# Patient Record
Sex: FEMALE | Race: BLACK OR AFRICAN AMERICAN | Employment: FULL TIME | URBAN - METROPOLITAN AREA
[De-identification: names, ages, dates, MRNs, and addresses within clinical notes are randomized per-mention and may not be internally consistent; named-entity substitution may affect disease eponyms.]

---

## 2022-08-01 ENCOUNTER — OFFICE VISIT (OUTPATIENT)
Dept: FAMILY MEDICINE CLINIC | Age: 42
End: 2022-08-01
Payer: COMMERCIAL

## 2022-08-01 VITALS
DIASTOLIC BLOOD PRESSURE: 80 MMHG | WEIGHT: 180.6 LBS | SYSTOLIC BLOOD PRESSURE: 113 MMHG | OXYGEN SATURATION: 99 % | BODY MASS INDEX: 33.23 KG/M2 | HEART RATE: 72 BPM | HEIGHT: 62 IN

## 2022-08-01 DIAGNOSIS — M25.562 CHRONIC PAIN OF BOTH KNEES: ICD-10-CM

## 2022-08-01 DIAGNOSIS — Z76.89 ESTABLISHING CARE WITH NEW DOCTOR, ENCOUNTER FOR: Primary | ICD-10-CM

## 2022-08-01 DIAGNOSIS — G89.29 CHRONIC PAIN OF BOTH KNEES: ICD-10-CM

## 2022-08-01 DIAGNOSIS — M25.561 CHRONIC PAIN OF BOTH KNEES: ICD-10-CM

## 2022-08-01 DIAGNOSIS — M25.522 CHRONIC ELBOW PAIN, LEFT: ICD-10-CM

## 2022-08-01 DIAGNOSIS — F41.9 ANXIETY: ICD-10-CM

## 2022-08-01 DIAGNOSIS — G89.29 CHRONIC ELBOW PAIN, LEFT: ICD-10-CM

## 2022-08-01 PROCEDURE — 99204 OFFICE O/P NEW MOD 45 MIN: CPT | Performed by: FAMILY MEDICINE

## 2022-08-01 RX ORDER — IBUPROFEN 800 MG/1
800 TABLET ORAL EVERY 6 HOURS PRN
COMMUNITY

## 2022-08-01 RX ORDER — CYCLOBENZAPRINE HCL 10 MG
10 TABLET ORAL PRN
COMMUNITY
End: 2022-08-21 | Stop reason: ALTCHOICE

## 2022-08-01 RX ORDER — FLUOXETINE HYDROCHLORIDE 20 MG/1
20 CAPSULE ORAL DAILY
Qty: 90 CAPSULE | Refills: 3 | Status: SHIPPED | OUTPATIENT
Start: 2022-08-01

## 2022-08-01 SDOH — ECONOMIC STABILITY: FOOD INSECURITY: WITHIN THE PAST 12 MONTHS, YOU WORRIED THAT YOUR FOOD WOULD RUN OUT BEFORE YOU GOT MONEY TO BUY MORE.: SOMETIMES TRUE

## 2022-08-01 SDOH — ECONOMIC STABILITY: FOOD INSECURITY: WITHIN THE PAST 12 MONTHS, THE FOOD YOU BOUGHT JUST DIDN'T LAST AND YOU DIDN'T HAVE MONEY TO GET MORE.: SOMETIMES TRUE

## 2022-08-01 ASSESSMENT — PATIENT HEALTH QUESTIONNAIRE - PHQ9
SUM OF ALL RESPONSES TO PHQ QUESTIONS 1-9: 12
SUM OF ALL RESPONSES TO PHQ9 QUESTIONS 1 & 2: 3
9. THOUGHTS THAT YOU WOULD BE BETTER OFF DEAD, OR OF HURTING YOURSELF: 0
8. MOVING OR SPEAKING SO SLOWLY THAT OTHER PEOPLE COULD HAVE NOTICED. OR THE OPPOSITE, BEING SO FIGETY OR RESTLESS THAT YOU HAVE BEEN MOVING AROUND A LOT MORE THAN USUAL: 0
SUM OF ALL RESPONSES TO PHQ QUESTIONS 1-9: 12
2. FEELING DOWN, DEPRESSED OR HOPELESS: 1
SUM OF ALL RESPONSES TO PHQ QUESTIONS 1-9: 12
SUM OF ALL RESPONSES TO PHQ QUESTIONS 1-9: 12
1. LITTLE INTEREST OR PLEASURE IN DOING THINGS: 2
10. IF YOU CHECKED OFF ANY PROBLEMS, HOW DIFFICULT HAVE THESE PROBLEMS MADE IT FOR YOU TO DO YOUR WORK, TAKE CARE OF THINGS AT HOME, OR GET ALONG WITH OTHER PEOPLE: 0
7. TROUBLE CONCENTRATING ON THINGS, SUCH AS READING THE NEWSPAPER OR WATCHING TELEVISION: 3
5. POOR APPETITE OR OVEREATING: 2
6. FEELING BAD ABOUT YOURSELF - OR THAT YOU ARE A FAILURE OR HAVE LET YOURSELF OR YOUR FAMILY DOWN: 0
4. FEELING TIRED OR HAVING LITTLE ENERGY: 2
3. TROUBLE FALLING OR STAYING ASLEEP: 2

## 2022-08-01 ASSESSMENT — SOCIAL DETERMINANTS OF HEALTH (SDOH): HOW HARD IS IT FOR YOU TO PAY FOR THE VERY BASICS LIKE FOOD, HOUSING, MEDICAL CARE, AND HEATING?: NOT HARD AT ALL

## 2022-08-01 NOTE — PROGRESS NOTES
[facility]  [address] [place] [address] Dr MU[address]OZ [address] [place] [address]  Dept: [phone]      Jagdish Rubalcava is a 42 y.o. female who presents today for follow up on her  medical conditions as noted below. Chief Complaint   Patient presents with    New Patient    Elbow Pain     left    Knee Pain     bilateral    ADHD    Anxiety       There is no problem list on file for this patient. No past medical history on file. No past surgical history on file. No family history on file. Current Outpatient Medications   Medication Sig Dispense Refill    ibuprofen (ADVIL;MOTRIN) 800 MG tablet Take 800 mg by mouth every 6 hours as needed for Pain      cyclobenzaprine (FLEXERIL) 10 MG tablet Take 10 mg by mouth as needed for Muscle spasms      FLUoxetine (PROZAC) 20 MG capsule Take 1 capsule by mouth in the morning. 90 capsule 3    diclofenac sodium (VOLTAREN) 1 % GEL Apply 4 g topically in the morning and 4 g at noon and 4 g in the evening and 4 g before bedtime. 150 g 3     No current facility-administered medications for this visit.      ALLERGIES:  No Known Allergies    Social History     Tobacco Use    Smoking status: Every Day     Packs/day: 0.50     Types: Cigarettes     Passive exposure: Current    Smokeless tobacco: Never   Substance Use Topics    Alcohol use: Yes     Comment: occasioanlly        No results found for: LDLCALC, LDLCHOLESTEROL, HDL, BUN, CREATININE, GLUCOSE, LABA1C, LABMICR           Subjective:      HPI  Is seen today as a new patient to establish care she is relocated from [facility] she works for [employer]  She states she has been having ongoing elbow pain for years she is seeing orthopedics in [facility] has had injections continues to have ongoing pain  She also has bilateral knee pain she has never had any surgeries no injuries but does do work  She also thinks she has ADD has never been diagnosed with definitely has concentration deficit and would like to get on medications  And she has ongoing issues with anxiety has been placed on Prozac and it seems to work but she needs refill she states she is up-to-date on labs and mammogram    Review of Systems:     Constitutional: Negative for fever, appetite change and fatigue. Family social and medical history reviewed and unchanged     HENT: Negative. Negative for nosebleeds, trouble swallowing and neck pain. Eyes: Negative for photophobia and visual disturbance. Respiratory: Negative. Negative for chest tightness and shortness of breath. Cardiovascular: Negative. Negative for chest pain and leg swelling. Gastrointestinal: Negative. Negative for abdominal pain and blood in stool. Endocrine: Negative for cold intolerance and polyuria. Genitourinary: Negative for dysuria and hematuria. Musculoskeletal: Negative. Skin: Negative for rash. Allergic/Immunologic: Negative. Neurological: Negative. Negative for dizziness, weakness and numbness. Hematological: Negative. Negative for adenopathy. Does not bruise/bleed easily. Psychiatric/Behavioral: Negative for sleep disturbance, dysphoric mood and  decreased concentration. The patient is not nervous/anxious. Objective:     Physical Exam:     Nursing note and vitals reviewed. /80 (Site: Left Upper Arm, Position: Sitting, Cuff Size: Medium Adult)   Pulse 72   Ht 5' 2\" (1.575 m)   Wt 180 lb 9.6 oz (81.9 kg)   LMP 07/30/2022   SpO2 99%   BMI 33.03 kg/m²   Constitutional: She is oriented to person, place, and time. She   appears well-developed and well-nourished. HENT:   Head: Normocephalic and atraumatic. Right Ear: External ear normal. Tympanic membrane is not erythematous. No middle ear effusion. Left Ear: External ear normal. Tympanic membrane is not erythematous. No middle ear effusion. Nose: No mucosal edema. Mouth/Throat: Oropharynx is clear and moist. No posterior oropharyngeal erythema.     Eyes: Conjunctivae and EOM are normal. Pupils are equal, round, and reactive to light. Neck: Normal range of motion. Neck supple. No thyromegaly present. Cardiovascular: Normal rate, regular rhythm and normal heart sounds. No murmur heard. Pulmonary/Chest: Effort normal and breath sounds normal. She has no wheezes. Shehas no rales. Abdominal: Soft. Bowel sounds are normal. She exhibits no distension and no mass. There is no tenderness. There is no rebound and no guarding. Genitourinary/Anorectal:deferred  Musculoskeletal: Normal range of motion. She exhibits no edema or tenderness. Lymphadenopathy: She has no cervical adenopathy. Neurological: She is alert and oriented to person, place, and time. She has normal reflexes. Skin: Skin is warm and dry. No rash noted. Psychiatric: She has a normal mood and affect. Her   behavior is normal.       Assessment:      1. Establishing care with new doctor, encounter for    2. Chronic elbow pain, left    3. Chronic pain of both knees    4. Anxiety          Plan:      Call or return to clinic prn if these symptoms worsen or fail to improve as anticipated. I have reviewed the instructions with the patient, answering all questions to her satisfaction. No follow-ups on file. Orders Placed This Encounter   Procedures    Sia Tavera DO, Orthopedic Surgery, [employer]     Referral Priority:   Routine     Referral Type:   Eval and Treat     Referral Reason:   Specialty Services Required     Referred to Provider:   Enma Siddiqi DO     Requested Specialty:   Orthopedic Surgery     Number of Visits Requested:   1     Orders Placed This Encounter   Medications    FLUoxetine (PROZAC) 20 MG capsule     Sig: Take 1 capsule by mouth in the morning. Dispense:  90 capsule     Refill:  3    diclofenac sodium (VOLTAREN) 1 % GEL     Sig: Apply 4 g topically in the morning and 4 g at noon and 4 g in the evening and 4 g before bedtime.      Dispense:  150 g     Refill:  3       Electronically signed by Brittni Felix DO on 8/1/2022 at 7:55 PM

## 2022-08-20 ENCOUNTER — HOSPITAL ENCOUNTER (EMERGENCY)
Age: 42
Discharge: HOME | End: 2022-08-21
Attending: EMERGENCY MEDICINE
Payer: COMMERCIAL

## 2022-08-20 ENCOUNTER — APPOINTMENT (OUTPATIENT)
Dept: CT IMAGING | Age: 42
End: 2022-08-20
Payer: COMMERCIAL

## 2022-08-20 ENCOUNTER — APPOINTMENT (OUTPATIENT)
Dept: GENERAL RADIOLOGY | Age: 42
End: 2022-08-20
Payer: COMMERCIAL

## 2022-08-20 VITALS
HEART RATE: 89 BPM | RESPIRATION RATE: 11 BRPM | TEMPERATURE: 98.4 F | OXYGEN SATURATION: 95 % | DIASTOLIC BLOOD PRESSURE: 92 MMHG | SYSTOLIC BLOOD PRESSURE: 127 MMHG
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
## 2022-08-20 PROCEDURE — 82565 ASSAY OF CREATININE: CPT

## 2022-08-20 PROCEDURE — 85730 THROMBOPLASTIN TIME PARTIAL: CPT

## 2022-08-20 PROCEDURE — 71046 X-RAY EXAM CHEST 2 VIEWS: CPT

## 2022-08-20 PROCEDURE — 84703 CHORIONIC GONADOTROPIN ASSAY: CPT

## 2022-08-20 PROCEDURE — 80051 ELECTROLYTE PANEL: CPT

## 2022-08-20 PROCEDURE — 6360000002 HC RX W HCPCS: Performed by: STUDENT IN AN ORGANIZED HEALTH CARE EDUCATION/TRAINING PROGRAM

## 2022-08-20 PROCEDURE — 72125 CT NECK SPINE W/O DYE: CPT

## 2022-08-20 PROCEDURE — 99284 EMERGENCY DEPT VISIT MOD MDM: CPT

## 2022-08-20 PROCEDURE — 85610 PROTHROMBIN TIME: CPT

## 2022-08-20 PROCEDURE — 70450 CT HEAD/BRAIN W/O DYE: CPT

## 2022-08-20 PROCEDURE — 6370000000 HC RX 637 (ALT 250 FOR IP): Performed by: STUDENT IN AN ORGANIZED HEALTH CARE EDUCATION/TRAINING PROGRAM

## 2022-08-20 PROCEDURE — 73030 X-RAY EXAM OF SHOULDER: CPT

## 2022-08-20 PROCEDURE — 82947 ASSAY GLUCOSE BLOOD QUANT: CPT

## 2022-08-20 PROCEDURE — 84520 ASSAY OF UREA NITROGEN: CPT

## 2022-08-20 PROCEDURE — 96374 THER/PROPH/DIAG INJ IV PUSH: CPT

## 2022-08-20 PROCEDURE — 85027 COMPLETE CBC AUTOMATED: CPT

## 2022-08-20 PROCEDURE — 73590 X-RAY EXAM OF LOWER LEG: CPT

## 2022-08-20 PROCEDURE — 82805 BLOOD GASES W/O2 SATURATION: CPT

## 2022-08-20 PROCEDURE — G0480 DRUG TEST DEF 1-7 CLASSES: HCPCS

## 2022-08-20 PROCEDURE — 73610 X-RAY EXAM OF ANKLE: CPT

## 2022-08-20 PROCEDURE — 73562 X-RAY EXAM OF KNEE 3: CPT

## 2022-08-20 RX ORDER — ORPHENADRINE CITRATE 30 MG/ML
60 INJECTION INTRAMUSCULAR; INTRAVENOUS ONCE
Status: DISCONTINUED | OUTPATIENT
Start: 2022-08-20 | End: 2022-08-21 | Stop reason: HOSPADM

## 2022-08-20 RX ORDER — HYDROCODONE BITARTRATE AND ACETAMINOPHEN 5; 325 MG/1; MG/1
1 TABLET ORAL EVERY 6 HOURS PRN
Status: DISCONTINUED | OUTPATIENT
Start: 2022-08-20 | End: 2022-08-21 | Stop reason: HOSPADM

## 2022-08-20 RX ORDER — FENTANYL CITRATE 50 UG/ML
50 INJECTION, SOLUTION INTRAMUSCULAR; INTRAVENOUS ONCE
Status: COMPLETED | OUTPATIENT
Start: 2022-08-20 | End: 2022-08-20

## 2022-08-20 RX ADMIN — HYDROCODONE BITARTRATE AND ACETAMINOPHEN 1 TABLET: 5; 325 TABLET ORAL at 23:53

## 2022-08-20 RX ADMIN — FENTANYL CITRATE 50 MCG: 50 INJECTION, SOLUTION INTRAMUSCULAR; INTRAVENOUS at 20:54

## 2022-08-20 ASSESSMENT — PAIN SCALES - GENERAL
PAINLEVEL_OUTOF10: 9
PAINLEVEL_OUTOF10: 9

## 2022-08-20 ASSESSMENT — PAIN - FUNCTIONAL ASSESSMENT: PAIN_FUNCTIONAL_ASSESSMENT: 0-10

## 2022-08-20 ASSESSMENT — PAIN DESCRIPTION - LOCATION: LOCATION: ANKLE;SHOULDER;KNEE

## 2022-08-21 LAB
ANION GAP SERPL CALCULATED.3IONS-SCNC: 14 MMOL/L (ref 9–17)
BLOOD BANK SPECIMEN: ABNORMAL
BUN BLDV-MCNC: 9 MG/DL (ref 6–20)
CARBOXYHEMOGLOBIN: 5.1 % (ref 0–5)
CHLORIDE BLD-SCNC: 104 MMOL/L (ref 98–107)
CO2: 21 MMOL/L (ref 20–31)
CREAT SERPL-MCNC: 0.76 MG/DL (ref 0.5–0.9)
ETHANOL PERCENT: 0.17 %
ETHANOL: 165 MG/DL
FIO2: ABNORMAL
GFR AFRICAN AMERICAN: >60 ML/MIN
GFR NON-AFRICAN AMERICAN: >60 ML/MIN
GFR SERPL CREATININE-BSD FRML MDRD: ABNORMAL ML/MIN/{1.73_M2}
GLUCOSE BLD-MCNC: 95 MG/DL (ref 70–99)
HCG QUALITATIVE: NEGATIVE
HCO3 VENOUS: 21.3 MMOL/L (ref 24–30)
HCT VFR BLD CALC: 35.3 % (ref 36.3–47.1)
HEMOGLOBIN: 12.1 G/DL (ref 11.9–15.1)
INR BLD: 1
MCH RBC QN AUTO: 30.3 PG (ref 25.2–33.5)
MCHC RBC AUTO-ENTMCNC: 34.3 G/DL (ref 28.4–34.8)
MCV RBC AUTO: 88.3 FL (ref 82.6–102.9)
NEGATIVE BASE EXCESS, VEN: 3.7 MMOL/L (ref 0–2)
NRBC AUTOMATED: 0 PER 100 WBC
O2 SAT, VEN: 89 % (ref 60–85)
PARTIAL THROMBOPLASTIN TIME: 22.9 SEC (ref 20.5–30.5)
PATIENT TEMP: 37
PCO2, VEN: 41.2 (ref 39–55)
PDW BLD-RTO: 14.7 % (ref 11.8–14.4)
PH VENOUS: 7.33 (ref 7.32–7.42)
PLATELET # BLD: 368 K/UL (ref 138–453)
PMV BLD AUTO: 11.5 FL (ref 8.1–13.5)
PO2, VEN: 61.9 (ref 30–50)
POTASSIUM SERPL-SCNC: 3.1 MMOL/L (ref 3.7–5.3)
PROTHROMBIN TIME: 10.4 SEC (ref 9.1–12.3)
RBC # BLD: 4 M/UL (ref 3.95–5.11)
SARS-COV-2, RAPID: NOT DETECTED
SODIUM BLD-SCNC: 139 MMOL/L (ref 135–144)
SPECIMEN DESCRIPTION: NORMAL
WBC # BLD: 9.3 K/UL (ref 3.5–11.3)

## 2022-08-21 PROCEDURE — 87635 SARS-COV-2 COVID-19 AMP PRB: CPT

## 2022-08-21 RX ORDER — CYCLOBENZAPRINE HCL 5 MG
5 TABLET ORAL 3 TIMES DAILY PRN
Qty: 9 TABLET | Refills: 0 | Status: SHIPPED | OUTPATIENT
Start: 2022-08-21 | End: 2022-08-24
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
## 2022-08-21 NOTE — ED NOTES
The following labs labeled with pt sticker and tubed to lab:     [] Blue     [] Lavender   [] on ice  [] Green/yellow  [] Green/black [] on ice  [] Cathye Cruz  [] on ice  [] Yellow  [] Red  [] Pink  [] VBG  [] VBG    [x] COVID-19 swab    [] Rapid  [] PCR  [] Flu swab  [] Peds Viral Panel     [] Urine Sample  [] Pelvic Cultures  [] Blood Cultures   [] STREP Cultures         Jazlyn Kern RN  08/21/22 0022

## 2022-08-21 NOTE — ED NOTES
Dr. Josef Khalil at bedside with ultrasound. FAST exam negative.       Marly Schaefer RN  08/20/22 4859
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
## 2022-08-23 ASSESSMENT — ENCOUNTER SYMPTOMS
CONSTIPATION: 0
VOMITING: 0
SHORTNESS OF BREATH: 0
ABDOMINAL DISTENTION: 0
ABDOMINAL PAIN: 0
NAUSEA: 0
DIARRHEA: 0
COUGH: 0
FACIAL SWELLING: 0
BACK PAIN: 0

## 2022-11-02 ENCOUNTER — OFFICE VISIT (OUTPATIENT)
Dept: OBGYN CLINIC | Age: 42
End: 2022-11-02
Payer: COMMERCIAL

## 2022-11-02 ENCOUNTER — HOSPITAL ENCOUNTER (OUTPATIENT)
Age: 42
Setting detail: SPECIMEN
Discharge: HOME | End: 2022-11-02

## 2022-11-02 VITALS
HEART RATE: 78 BPM | DIASTOLIC BLOOD PRESSURE: 76 MMHG | SYSTOLIC BLOOD PRESSURE: 119 MMHG | WEIGHT: 186.6 LBS | BODY MASS INDEX: 34.13 KG/M2

## 2022-11-02 DIAGNOSIS — Z12.31 SCREENING MAMMOGRAM FOR BREAST CANCER: ICD-10-CM

## 2022-11-02 DIAGNOSIS — Z01.419 WELL WOMAN EXAM WITH ROUTINE GYNECOLOGICAL EXAM: Primary | ICD-10-CM

## 2022-11-02 DIAGNOSIS — Z01.419 WELL WOMAN EXAM WITH ROUTINE GYNECOLOGICAL EXAM: ICD-10-CM

## 2022-11-02 DIAGNOSIS — N94.6 DYSMENORRHEA: ICD-10-CM

## 2022-11-02 DIAGNOSIS — N92.0 MENORRHAGIA WITH REGULAR CYCLE: ICD-10-CM

## 2022-11-02 DIAGNOSIS — Z72.0 TOBACCO USE: ICD-10-CM

## 2022-11-02 PROCEDURE — 99396 PREV VISIT EST AGE 40-64: CPT | Performed by: STUDENT IN AN ORGANIZED HEALTH CARE EDUCATION/TRAINING PROGRAM

## 2022-11-02 RX ORDER — ACETAMINOPHEN AND CODEINE PHOSPHATE 120; 12 MG/5ML; MG/5ML
1 SOLUTION ORAL DAILY
Qty: 90 TABLET | Refills: 3 | Status: SHIPPED | OUTPATIENT
Start: 2022-11-02

## 2022-11-02 NOTE — PROGRESS NOTES
[address] [facility] Gyne[facility]  [address] [facility], [address] [place] [address]      Beverely Going  11/2/2022                       Primary Care Physician: Jannie Davenport DO    CC: No chief complaint on file. HPI: Christy Devine is a 42 y.o. female [id number] Patient's last menstrual period was 10/01/2022. The patient was seen and examined. She is here for an annual visit. She is complaining of recurrent BV. Discussed using Dove unscented body washes, laundry detergents, sleeping with no undergarments, cotton undergarments and use of condoms. Patient also is a smoker and would like to quit. Recommend patient use a nicotine patch and patient is amenable. She complains of heavy bleeding and dysmenorrhea. Her periods are regular and last 5 days. She describes them as heavy. Her bowel habits are regular. She denies any bloating. She denies dysuria. She denies urinary leaking. She denies vaginal discharge. She is sexually active with single partner, contraception - none. She uses no method for contraception and is not desiring pregnancy. Due to patient being a current every day tobacco user, recommend progesterone only methods to help control bleeding. Patient is interested in the progesterone only pill at this time.     Depression Screen: Symptoms of decreased mood absent  Symptoms of anhedonia absent  **If either question is answered in a  positive fashion then complete the PHQ9 Scoring Evaluation and make the appropriate referral**    REVIEW OF SYSTEMS:   Constitutional: negative fever, negative chills  HEENT: negative visual disturbances, negative headaches  Respiratory: negative dyspnea, negative cough  Cardiovascular: negative chest pain,  negative palpitations  Gastrointestinal: negative abdominal pain, negative RUQ pain, negative N/V, negative diarrhea, negative constipation  Genitourinary: negative dysuria, positive vaginal discharge, positive dysmenorrhea and menorrhagia  Dermatological: negative rash  Hematologic: negative bruising  Immunologic/Lymphatic: negative recent illness, negative recent sick contact  Musculoskeletal: negative back pain, negative myalgias, negative arthralgias  Neurological:  negative dizziness, negative weakness  Behavior/Psych: negative depression, negative anxiety      GYNECOLOGICAL HISTORY:  Age of Menarche: 14  Age of Menopause: N/A     STD History: no past history    Permanent Sterilization: no  Reversible Birth Control: no  Hormone Replacement Exposure: no    OBSTETRICAL HISTORY:  OB History    Para Term  AB Living   4 2 2 0 2 2   SAB IAB Ectopic Molar Multiple Live Births   1 0 0 0 0 0      # Outcome Date GA Lbr Enrique/2nd Weight Sex Delivery Anes PTL Lv   4 AB  6w0d          3 SAB            2 Term      Vag-Spont      1 Term      Vag-Spont          PAST MEDICAL HISTORY:   has no past medical history on file. PAST SURGICAL HISTORY:   has no past surgical history on file. ALLERGIES:  has No Known Allergies. MEDICATIONS:  Prior to Admission medications    Medication Sig Start Date End Date Taking? Authorizing Provider   nicotine (NICODERM CQ) 7 MG/24HR Place 1 patch onto the skin daily for 14 days 22 Yes Allyn Hanson DO   norethindrone (ORTHO MICRONOR) 0.35 MG tablet Take 1 tablet by mouth daily 22  Yes Allyn Hanson DO   ibuprofen (ADVIL;MOTRIN) 800 MG tablet Take 800 mg by mouth every 6 hours as needed for Pain   Yes Historical Provider, MD   FLUoxetine (PROZAC) 20 MG capsule Take 1 capsule by mouth in the morning. 22  Yes Caitlin Goddard DO   diclofenac sodium (VOLTAREN) 1 % GEL Apply 4 g topically in the morning and 4 g at noon and 4 g in the evening and 4 g before bedtime. 22  Yes Caitlin Goddard DO       FAMILY HISTORY:  Family History of Breast, Ovarian, Colon or Uterine Cancer: No   family history is not on file.     SOCIAL HISTORY:   reports that she has been smoking cigarettes. She has been exposed to tobacco smoke. She has never used smokeless tobacco. She reports current alcohol use. She reports current drug use. Drug: Marijuana Deadra Chinedu). HEALTH MAINTENANCE:  Immunization status: stated as up to date, no records available    [address] [place] [address]: Ordered today  Colonoscopy: N/A  Pap Smears: None on file. Ordered today. Family Planning: None. Desires POPs. DEXA: N/A    VITALS:  Vitals:    11/02/22 1547   BP: 119/76   Site: Right Upper Arm   Position: Sitting   Cuff Size: Medium Adult   Pulse: 78   Weight: 186 lb 9.6 oz (84.6 kg)                                                                                                                                                                                                   PHYSICAL EXAM:   General Appearance: Appears healthy. Alert; in no acute distress. Pleasant. Skin: Skin color, texture, turgor normal. No rashes or lesions. HEENT: normocephalic and atraumatic, Thyroid normal to inspection and palpation  Respiratory: Normal expansion. Clear to auscultation. No rales, rhonchi, or wheezing. Cardiovascular: normal rate, normal S1 and S2, no gallops, intact distal pulses and no carotid bruits  Breast:  (Chest): normal appearance, no masses or tenderness  Abdomen: soft, non-tender, non-distended, no right upper quadrant tenderness and no CVA tenderness  Pelvic Exam:   External genitalia: General appearance; normal, Hair distribution; normal, Lesions absent  Urinary system: urethral meatus normal  Vaginal: normal mucosa, no discharge  Cervix: normal appearing cervix without discharge or lesions  Adnexa: non-palpable  Uterus: normal single, nontender  Rectal Exam: exam declined by patient  Musculoskeletal: no gross abnormalities  Extremities: non-tender BLE and non-edematous  Psych:  oriented to time, place and person     DATA:  No results found for this visit on 11/02/22.     ASSESSMENT & PLAN:    Christian Cedillo Trista Bianchi is a 42 y.o. female [id number] Patient's last menstrual period was 10/01/2022. Annual:  Mammogram: Ordered today  Colonoscopy: N/A  Pap Smears: None on file. Ordered today. Family Planning: None. Desires POPs. DEXA: N/A    Recurrent BV   - Vag probe and GC pending. Will treat based on results. Dysmenorrhea and Menorrhagia with Regular Cycle   - POP Rx sent   - GYN US ordered    Tobacco use   - Desires Nicotine patch    Patient Active Problem List    Diagnosis Date Noted    Tobacco use 11/02/2022     Priority: Medium     Desires Nicotine Patch      Dysmenorrhea 11/02/2022     Priority: Medium    Menorrhagia with regular cycle 11/02/2022     Priority: Medium         Return for Needs GYN US with Miriam Jeronimo No Patient Care Coordination Note on file. Counseling Completed:    Discussed need for repeat pap as per American Society for Colposcopy and Cervical Pathology guidelines. Discussed need for mammograms every 1 year, If >42 yo and last mammogram was negative. Discussed Calcium and Vitamin D dosing. Discussed need for colonoscopy screening as well as onset for bone density testing. Discussed birth control and barrier recommendations. Discussed STD counseling and prevention. Discussed Gardisil counseling for all patients 10-37 yo. Hereditary Breast, Ovarian, Colon and Uterine Cancer screening discussed. Tobacco & Secondary smoke risks discussed; with recommendation for cessation and avoidance. Routine health maintenance per patients PCP discussed. Diagnosis Orders   1. Well woman exam with routine gynecological exam  PAP SMEAR    Vaginitis DNA Probe    C.trachomatis N.gonorrhoeae DNA      2. Screening mammogram for breast cancer  JOSEPHINE MINESH DIGITAL SCREEN BILATERAL      3. Tobacco use  nicotine (NICODERM CQ) 7 MG/24HR      4. Dysmenorrhea  US PELVIS COMPLETE NON-OB TRANSABDOMINAL AND TRANSVAGINAL    norethindrone (ORTHO MICRONOR) 0.35 MG tablet      5.  Menorrhagia with regular cycle

## 2022-11-03 DIAGNOSIS — B96.89 BACTERIAL VAGINITIS: Primary | ICD-10-CM

## 2022-11-03 DIAGNOSIS — N76.0 BACTERIAL VAGINITIS: Primary | ICD-10-CM

## 2022-11-03 LAB
C TRACH DNA GENITAL QL NAA+PROBE: NEGATIVE
CANDIDA SPECIES, DNA PROBE: NEGATIVE
GARDNERELLA VAGINALIS, DNA PROBE: POSITIVE
HPV SAMPLE: NORMAL
HPV, GENOTYPE 16: NOT DETECTED
HPV, GENOTYPE 18: NOT DETECTED
HPV, HIGH RISK OTHER: NOT DETECTED
HPV, INTERPRETATION: NORMAL
N. GONORRHOEAE DNA: NEGATIVE
SOURCE: ABNORMAL
SPECIMEN DESCRIPTION: NORMAL
SPECIMEN DESCRIPTION: NORMAL
TRICHOMONAS VAGINALIS DNA: NEGATIVE

## 2022-11-03 RX ORDER — METRONIDAZOLE 7.5 MG/G
GEL VAGINAL
Qty: 70 G | Refills: 2 | Status: SHIPPED | OUTPATIENT
Start: 2022-11-03

## 2022-11-03 RX ORDER — METRONIDAZOLE 500 MG/1
500 TABLET ORAL 2 TIMES DAILY
Qty: 14 TABLET | Refills: 0 | Status: SHIPPED | OUTPATIENT
Start: 2022-11-03 | End: 2022-11-03

## 2022-11-03 RX ORDER — FLUCONAZOLE 150 MG/1
150 TABLET ORAL ONCE
Qty: 2 TABLET | Refills: 0 | Status: SHIPPED | OUTPATIENT
Start: 2022-11-03 | End: 2022-11-03

## 2022-11-03 RX ORDER — METRONIDAZOLE 7.5 MG/G
GEL VAGINAL
Qty: 70 G | Refills: 2 | Status: SHIPPED | OUTPATIENT
Start: 2022-11-03 | End: 2022-11-03

## 2022-11-03 RX ORDER — METRONIDAZOLE 500 MG/1
500 TABLET ORAL 2 TIMES DAILY
Qty: 14 TABLET | Refills: 0 | Status: SHIPPED | OUTPATIENT
Start: 2022-11-03 | End: 2022-11-10

## 2022-11-04 DIAGNOSIS — M25.522 LEFT ELBOW PAIN: Primary | ICD-10-CM

## 2022-11-09 LAB — CYTOLOGY REPORT: NORMAL

## 2022-12-12 DIAGNOSIS — M25.522 ELBOW PAIN, LEFT: Primary | ICD-10-CM

## 2023-01-31 NOTE — TELEPHONE ENCOUNTER
Britton Cotto is calling to request a refill on the following medication(s):    Last Visit Date (If Applicable):  1/3/0826    Next Visit Date:    Visit date not found    Medication Request:  Requested Prescriptions     Pending Prescriptions Disp Refills    cyclobenzaprine (FLEXERIL) 5 MG tablet 9 tablet 0     Sig: Take 1 tablet by mouth 3 times daily as needed for Muscle spasms

## 2023-02-01 RX ORDER — CYCLOBENZAPRINE HCL 5 MG
5 TABLET ORAL 3 TIMES DAILY PRN
Qty: 9 TABLET | Refills: 0 | Status: SHIPPED | OUTPATIENT
Start: 2023-02-01 | End: 2023-02-04

## 2023-02-13 ENCOUNTER — HOSPITAL ENCOUNTER (EMERGENCY)
Age: 43
Discharge: HOME | End: 2023-02-13
Attending: EMERGENCY MEDICINE
Payer: COMMERCIAL

## 2023-02-13 VITALS
SYSTOLIC BLOOD PRESSURE: 138 MMHG | DIASTOLIC BLOOD PRESSURE: 75 MMHG | WEIGHT: 175 LBS | BODY MASS INDEX: 32.01 KG/M2 | RESPIRATION RATE: 16 BRPM | TEMPERATURE: 99.3 F | OXYGEN SATURATION: 100 % | HEART RATE: 76 BPM

## 2023-02-13 DIAGNOSIS — B96.89 BACTERIAL VAGINITIS: ICD-10-CM

## 2023-02-13 DIAGNOSIS — B96.89 BACTERIAL VAGINOSIS: Primary | ICD-10-CM

## 2023-02-13 DIAGNOSIS — N76.0 BACTERIAL VAGINOSIS: Primary | ICD-10-CM

## 2023-02-13 DIAGNOSIS — N76.0 BACTERIAL VAGINITIS: ICD-10-CM

## 2023-02-13 DIAGNOSIS — Z20.2 STD EXPOSURE: ICD-10-CM

## 2023-02-13 DIAGNOSIS — M25.552 PAIN IN JOINT INVOLVING LEFT PELVIC REGION AND THIGH: ICD-10-CM

## 2023-02-13 LAB
ABSOLUTE EOS #: 0.05 K/UL (ref 0–0.44)
ABSOLUTE IMMATURE GRANULOCYTE: 0.01 K/UL (ref 0–0.3)
ABSOLUTE LYMPH #: 2.68 K/UL (ref 1.1–3.7)
ABSOLUTE MONO #: 0.78 K/UL (ref 0.1–1.2)
ALBUMIN SERPL-MCNC: 4.2 G/DL (ref 3.5–5.2)
ALP SERPL-CCNC: 44 U/L (ref 35–104)
ALT SERPL-CCNC: 13 U/L (ref 5–33)
ANION GAP SERPL CALCULATED.3IONS-SCNC: 9 MMOL/L (ref 9–17)
AST SERPL-CCNC: 13 U/L
BASOPHILS # BLD: 0 % (ref 0–2)
BASOPHILS ABSOLUTE: <0.03 K/UL (ref 0–0.2)
BILIRUB DIRECT SERPL-MCNC: 0.1 MG/DL
BILIRUB INDIRECT SERPL-MCNC: 0.3 MG/DL (ref 0–1)
BILIRUB SERPL-MCNC: 0.4 MG/DL (ref 0.3–1.2)
BILIRUBIN URINE: NEGATIVE
BUN SERPL-MCNC: 10 MG/DL (ref 6–20)
BUN/CREAT BLD: 13 (ref 9–20)
CALCIUM SERPL-MCNC: 8.9 MG/DL (ref 8.6–10.4)
CANDIDA SPECIES, DNA PROBE: NEGATIVE
CHLORIDE SERPL-SCNC: 105 MMOL/L (ref 98–107)
CHP ED QC CHECK: NORMAL
CO2 SERPL-SCNC: 26 MMOL/L (ref 20–31)
COLOR: YELLOW
COMMENT UA: NORMAL
CREAT SERPL-MCNC: 0.8 MG/DL (ref 0.5–0.9)
EOSINOPHILS RELATIVE PERCENT: 1 % (ref 1–4)
GARDNERELLA VAGINALIS, DNA PROBE: POSITIVE
GFR SERPL CREATININE-BSD FRML MDRD: >60 ML/MIN/1.73M2
GLUCOSE SERPL-MCNC: 90 MG/DL (ref 70–99)
GLUCOSE UR STRIP.AUTO-MCNC: NEGATIVE MG/DL
HCT VFR BLD AUTO: 36.1 % (ref 36.3–47.1)
HGB BLD-MCNC: 11 G/DL (ref 11.9–15.1)
IMMATURE GRANULOCYTES: 0 %
KETONES UR STRIP.AUTO-MCNC: NEGATIVE MG/DL
LEUKOCYTE ESTERASE UR QL STRIP.AUTO: NEGATIVE
LIPASE SERPL-CCNC: 36 U/L (ref 13–60)
LYMPHOCYTES # BLD: 32 % (ref 24–43)
MCH RBC QN AUTO: 27.8 PG (ref 25.2–33.5)
MCHC RBC AUTO-ENTMCNC: 30.5 G/DL (ref 28.4–34.8)
MCV RBC AUTO: 91.2 FL (ref 82.6–102.9)
MONOCYTES # BLD: 9 % (ref 3–12)
NITRITE UR QL STRIP.AUTO: NEGATIVE
NRBC AUTOMATED: 0 PER 100 WBC
PDW BLD-RTO: 14.6 % (ref 11.8–14.4)
PLATELET # BLD AUTO: 329 K/UL (ref 138–453)
PMV BLD AUTO: 10.4 FL (ref 8.1–13.5)
POTASSIUM SERPL-SCNC: 4.1 MMOL/L (ref 3.7–5.3)
PREGNANCY TEST URINE, POC: NORMAL
PROT SERPL-MCNC: 6.7 G/DL (ref 6.4–8.3)
PROT UR STRIP.AUTO-MCNC: 6 MG/DL (ref 5–8)
PROT UR STRIP.AUTO-MCNC: NEGATIVE MG/DL
RBC # BLD: 3.96 M/UL (ref 3.95–5.11)
RBC # BLD: ABNORMAL 10*6/UL
SEG NEUTROPHILS: 58 % (ref 36–65)
SEGMENTED NEUTROPHILS ABSOLUTE COUNT: 4.88 K/UL (ref 1.5–8.1)
SODIUM SERPL-SCNC: 140 MMOL/L (ref 135–144)
SOURCE: ABNORMAL
SPECIFIC GRAVITY UA: 1.02 (ref 1–1.03)
TRICHOMONAS VAGINALIS DNA: NEGATIVE
TURBIDITY: CLEAR
URINE HGB: NEGATIVE
UROBILINOGEN, URINE: NORMAL
WBC # BLD AUTO: 8.4 K/UL (ref 3.5–11.3)

## 2023-02-13 PROCEDURE — 6360000002 HC RX W HCPCS: Performed by: EMERGENCY MEDICINE

## 2023-02-13 PROCEDURE — 83690 ASSAY OF LIPASE: CPT

## 2023-02-13 PROCEDURE — 85025 COMPLETE CBC W/AUTO DIFF WBC: CPT

## 2023-02-13 PROCEDURE — 81003 URINALYSIS AUTO W/O SCOPE: CPT

## 2023-02-13 PROCEDURE — 6370000000 HC RX 637 (ALT 250 FOR IP): Performed by: EMERGENCY MEDICINE

## 2023-02-13 PROCEDURE — 87510 GARDNER VAG DNA DIR PROBE: CPT

## 2023-02-13 PROCEDURE — 87591 N.GONORRHOEAE DNA AMP PROB: CPT

## 2023-02-13 PROCEDURE — 80048 BASIC METABOLIC PNL TOTAL CA: CPT

## 2023-02-13 PROCEDURE — 87491 CHLMYD TRACH DNA AMP PROBE: CPT

## 2023-02-13 PROCEDURE — 99283 EMERGENCY DEPT VISIT LOW MDM: CPT

## 2023-02-13 PROCEDURE — 80076 HEPATIC FUNCTION PANEL: CPT

## 2023-02-13 PROCEDURE — 87480 CANDIDA DNA DIR PROBE: CPT

## 2023-02-13 PROCEDURE — 87660 TRICHOMONAS VAGIN DIR PROBE: CPT

## 2023-02-13 RX ORDER — CEFTRIAXONE 500 MG/1
500 INJECTION, POWDER, FOR SOLUTION INTRAMUSCULAR; INTRAVENOUS ONCE
Status: DISCONTINUED | OUTPATIENT
Start: 2023-02-13 | End: 2023-02-13 | Stop reason: HOSPADM

## 2023-02-13 RX ORDER — CYCLOBENZAPRINE HCL 10 MG
10 TABLET ORAL PRN
Qty: 30 TABLET | Refills: 0 | Status: SHIPPED | OUTPATIENT
Start: 2023-02-13

## 2023-02-13 RX ORDER — AZITHROMYCIN 250 MG/1
1000 TABLET, FILM COATED ORAL ONCE
Status: COMPLETED | OUTPATIENT
Start: 2023-02-13 | End: 2023-02-13

## 2023-02-13 RX ORDER — METRONIDAZOLE 500 MG/1
500 TABLET ORAL 2 TIMES DAILY
Qty: 14 TABLET | Refills: 0 | Status: SHIPPED | OUTPATIENT
Start: 2023-02-13 | End: 2023-02-20

## 2023-02-13 RX ORDER — CEFTRIAXONE SODIUM 250 MG/1
250 INJECTION, POWDER, FOR SOLUTION INTRAMUSCULAR; INTRAVENOUS ONCE
Status: DISCONTINUED | OUTPATIENT
Start: 2023-02-13 | End: 2023-02-13

## 2023-02-13 RX ADMIN — AZITHROMYCIN MONOHYDRATE 1000 MG: 250 TABLET ORAL at 14:15

## 2023-02-13 ASSESSMENT — PAIN DESCRIPTION - FREQUENCY: FREQUENCY: CONTINUOUS

## 2023-02-13 ASSESSMENT — ENCOUNTER SYMPTOMS
DIARRHEA: 0
EYE REDNESS: 0
SORE THROAT: 0
COLOR CHANGE: 0
SHORTNESS OF BREATH: 0
ABDOMINAL PAIN: 1
EYE DISCHARGE: 0
VOMITING: 0
NAUSEA: 0
RHINORRHEA: 0
COUGH: 0

## 2023-02-13 ASSESSMENT — PAIN DESCRIPTION - LOCATION: LOCATION: BACK;PELVIS

## 2023-02-13 ASSESSMENT — PAIN - FUNCTIONAL ASSESSMENT: PAIN_FUNCTIONAL_ASSESSMENT: 0-10

## 2023-02-13 ASSESSMENT — PAIN SCALES - GENERAL: PAINLEVEL_OUTOF10: 8

## 2023-02-13 ASSESSMENT — PAIN DESCRIPTION - DESCRIPTORS: DESCRIPTORS: SHARP;THROBBING

## 2023-02-13 ASSESSMENT — PAIN DESCRIPTION - ORIENTATION: ORIENTATION: LEFT

## 2023-02-13 NOTE — ED NOTES
Pt to ed c/o chronic back pain, pelvic pain x 1 week, and possible exposure to STD  Pt rates pain 8/10  Pt provided urine sample. Sample sent to lab. Pt a/o x4  Respirations equal and nonlabored  Call light in reach  Bed locked and in lowest position  Will continue to monitor.       [address] [facility] [place], [place], RN  02/13/23 Sapphire Sandoval, SALAZAR  02/13/23 1493

## 2023-02-13 NOTE — ED PROVIDER NOTES
EMERGENCY DEPARTMENT ENCOUNTER    Pt Name: Crystal Bhatt  MRN: [id number]  [place] 1980  Date of evaluation: 2/13/23  CHIEF COMPLAINT       Chief Complaint   Patient presents with    Back Pain     chronic    Pelvic Pain     Left lower onset over a week    Exposure to STD     States new partner     HISTORY OF PRESENT ILLNESS   58-year-old female who presents with complaints of left lower quadrant abdominal pain, some chronic back pain and concerns for exposure to an STD. Patient states that over the past few days she has noticed some vaginal discharge associated with some vaginal odor. She complains of associated left lower quadrant abdominal pain and chronic back pain. The patient denies any fevers or chills, no cough or sputum production, she denies any dysuria hematuria, the patient denies any stool changes. REVIEW OF SYSTEMS     Review of Systems   Constitutional:  Negative for chills and fever. HENT:  Negative for rhinorrhea and sore throat. Eyes:  Negative for discharge, redness and visual disturbance. Respiratory:  Negative for cough and shortness of breath. Cardiovascular:  Negative for chest pain, palpitations and leg swelling. Gastrointestinal:  Positive for abdominal pain. Negative for diarrhea, nausea and vomiting. Genitourinary:  Positive for vaginal discharge. Negative for dysuria and hematuria. Musculoskeletal:  Negative for arthralgias, myalgias and neck pain. Skin:  Negative for color change and rash. Neurological:  Negative for seizures, weakness and headaches. Psychiatric/Behavioral:  Negative for hallucinations, self-injury and suicidal ideas. PASTMEDICAL HISTORY   History reviewed. No pertinent past medical history. Past Problem List  Patient Active Problem List   Diagnosis Code    Tobacco use Z72.0    Dysmenorrhea N94.6    Menorrhagia with regular cycle N92.0     SURGICAL HISTORY     History reviewed. No pertinent surgical history.   CURRENT MEDICATIONS Previous Medications    DICLOFENAC SODIUM (VOLTAREN) 1 % GEL    Apply 4 g topically in the morning and 4 g at noon and 4 g in the evening and 4 g before bedtime. FLUOXETINE (PROZAC) 20 MG CAPSULE    Take 1 capsule by mouth in the morning. IBUPROFEN (ADVIL;MOTRIN) 800 MG TABLET    Take 800 mg by mouth every 6 hours as needed for Pain    NICOTINE (NICODERM CQ) 7 MG/24HR    Place 1 patch onto the skin daily for 14 days    NORETHINDRONE (ORTHO MICRONOR) 0.35 MG TABLET    Take 1 tablet by mouth daily     ALLERGIES     has No Known Allergies. FAMILY HISTORY     has no family status information on file. SOCIAL HISTORY       Social History     Tobacco Use    Smoking status: Every Day     Types: Cigarettes     Passive exposure: Current    Smokeless tobacco: Never   Vaping Use    Vaping Use: Every day   Substance Use Topics    Alcohol use: Yes     Comment: occasioanlly    Drug use: Yes     Types: Marijuana (Weed)     PHYSICAL EXAM     INITIAL VITALS: /75   Pulse 76   Temp 99.3 °F (37.4 °C) (Oral)   Resp 16   Wt 175 lb (79.4 kg)   LMP 01/27/2023   SpO2 100%   BMI 32.01 kg/m²    Physical Exam  Exam conducted with a chaperone present. Constitutional:       Appearance: Normal appearance. She is well-developed. She is not ill-appearing or toxic-appearing. HENT:      Head: Normocephalic and atraumatic. Eyes:      Conjunctiva/sclera: Conjunctivae normal.      Pupils: Pupils are equal, round, and reactive to light. Neck:      Trachea: Trachea normal.   Cardiovascular:      Rate and Rhythm: Normal rate and regular rhythm. Heart sounds: S1 normal and S2 normal. No murmur heard. Pulmonary:      Effort: Pulmonary effort is normal. No accessory muscle usage or respiratory distress. Breath sounds: Normal breath sounds. Chest:      Chest wall: No deformity or tenderness. Abdominal:      General: Bowel sounds are normal. There is no distension or abdominal bruit.       Palpations: Abdomen is not rigid. Tenderness: There is no abdominal tenderness. There is no guarding or rebound. Genitourinary:     General: Normal vulva. Labia:         Right: No rash, tenderness, lesion or injury. Left: No rash, tenderness, lesion or injury. Vagina: No signs of injury and foreign body. Vaginal discharge present. No bleeding or prolapsed vaginal walls. Cervix: Normal.      Uterus: Normal.       Adnexa: Right adnexa normal and left adnexa normal.   Musculoskeletal:      Cervical back: Normal range of motion and neck supple. Skin:     General: Skin is warm. Findings: No rash. Neurological:      Mental Status: She is alert and oriented to person, place, and time. GCS: GCS eye subscore is 4. GCS verbal subscore is 5. GCS motor subscore is 6. Psychiatric:         Speech: Speech normal.       MEDICAL DECISION MAKING / ED COURSE:   Summary of Patient Presentation:    54-year-old female, left lower quadrant pain, plan is basic labs pelvic exam, pregnancy test and reevaluation. 1)  Number and Complexity of Problems  Problem List This Visit: Abdominal pain, vaginal discharge    Differential Diagnosis: STD, urinary tract infection, diverticulitis, ovarian cyst, ectopic pregnancy, ovarian torsion    Diagnoses Considered but Do Not Suspect: Tubo-ovarian abscess    Pertinent Comorbid Conditions: Exposure to STD    2)  Data Reviewed      Imaging that is independently reviewed and interpreted by me as:      See more data below for the lab and radiology tests and orders. 3)  Treatment and Disposition    Patient's pelvic exam showed evidence of Gardnerella, the patient wanted treatment for sexually transmitted infections, we will provide Rocephin and azithromycin, there is nothing on her clinical exam to suggest ectopic pregnancy or an acute ovarian torsion. Shared Decision Making: The patient was involved in his/her plan of care through shared decision making.  The testing that was ordered was discussed with the patient. Any medications that may have been ordered were discussed with the patient    Code Status Discussion:      \"ED Course\" Notes From Epic Narrator:         CRITICAL CARE:       PROCEDURES:    Procedures      DATA FOR LAB AND RADIOLOGY TESTS ORDERED BELOW ARE REVIEWED BY THE ED CLINICIAN:    RADIOLOGY: All x-rays, CT, MRI, and formal ultrasound images (except ED bedside ultrasound) are read by the radiologist, see reports below, unless otherwise noted in MDM or here. Reports below are reviewed by myself. No orders to display       LABS: Lab orders shown below, the results are reviewed by myself, and all abnormals are listed below.   Labs Reviewed   VAGINITIS DNA PROBE - Abnormal; Notable for the following components:       Result Value    Gardnerella Vaginalis, DNA Probe POSITIVE (*)     All other components within normal limits   CBC WITH AUTO DIFFERENTIAL - Abnormal; Notable for the following components:    Hemoglobin 11.0 (*)     Hematocrit 36.1 (*)     RDW 14.6 (*)     All other components within normal limits   POCT URINE PREGNANCY - Normal   C.TRACHOMATIS N.GONORRHOEAE DNA   URINALYSIS   BASIC METABOLIC PANEL   HEPATIC FUNCTION PANEL   LIPASE       Vitals Reviewed:    Vitals:    02/13/23 1117 02/13/23 1150   BP: 138/75    Pulse: 76    Resp: 16    Temp: 99.3 °F (37.4 °C)    TempSrc: Oral    SpO2: 100%    Weight: (!) 1175 lb (533 kg) 175 lb (79.4 kg)     MEDICATIONS GIVEN TO PATIENT THIS ENCOUNTER:  Orders Placed This Encounter   Medications    DISCONTD: cefTRIAXone (ROCEPHIN) injection 250 mg     Order Specific Question:   Antimicrobial Indications     Answer:   STD infection    azithromycin (ZITHROMAX) tablet 1,000 mg     Order Specific Question:   Antimicrobial Indications     Answer:   STD infection    metroNIDAZOLE (FLAGYL) 500 MG tablet     Sig: Take 1 tablet by mouth 2 times daily for 7 days     Dispense:  14 tablet     Refill:  0    cefTRIAXone (ROCEPHIN) injection 500 mg     Order Specific Question:   Antimicrobial Indications     Answer:   STD infection     DISCHARGE PRESCRIPTIONS:  New Prescriptions    METRONIDAZOLE (FLAGYL) 500 MG TABLET    Take 1 tablet by mouth 2 times daily for 7 days     PHYSICIAN CONSULTS ORDERED THIS ENCOUNTER:  None  FINAL IMPRESSION      1. Bacterial vaginosis    2. STD exposure    3.  Pain in joint involving left pelvic region and thigh          DISPOSITION/PLAN   DISPOSITION Decision To Discharge 02/13/2023 02:03:34 PM      OUTPATIENT FOLLOW UP THE PATIENT:  Timothy Saunders DO  [address] [place] [address]  [phone]    Schedule an appointment as soon as possible for a visit in 2 days      MD Ervin Carter MD  02/13/23 0234

## 2023-02-13 NOTE — TELEPHONE ENCOUNTER
Patient states the wrong dose was sent in, she says she also does not use [facility] and they will not allow her to get her medication.  She takes 10mg and uses [address], please advise

## 2023-02-14 LAB
C TRACH DNA SPEC QL PROBE+SIG AMP: NEGATIVE
N GONORRHOEA DNA SPEC QL PROBE+SIG AMP: NEGATIVE
SPECIMEN DESCRIPTION: NORMAL

## 2023-02-14 RX ORDER — FLUCONAZOLE 150 MG/1
TABLET ORAL
Qty: 2 TABLET | Refills: 0 | Status: SHIPPED | OUTPATIENT
Start: 2023-02-14

## 2023-02-14 NOTE — TELEPHONE ENCOUNTER
PT SEEN BY podiatry for swabs? ?     Gyn pt   Last seen 11-2-22 NP annual  No future appts   Last refill n/a    Pt seen yesterday by podiatry

## 2023-05-09 DIAGNOSIS — B96.89 BACTERIAL VAGINITIS: ICD-10-CM

## 2023-05-09 DIAGNOSIS — Z72.0 TOBACCO USE: ICD-10-CM

## 2023-05-09 DIAGNOSIS — N76.0 BACTERIAL VAGINITIS: ICD-10-CM

## 2023-05-09 RX ORDER — METRONIDAZOLE 7.5 MG/G
GEL VAGINAL
Qty: 70 G | Refills: 2 | Status: SHIPPED | OUTPATIENT
Start: 2023-05-09

## 2023-06-20 ENCOUNTER — PROCEDURE VISIT (OUTPATIENT)
Dept: OBGYN CLINIC | Age: 43
End: 2023-06-20
Payer: COMMERCIAL

## 2023-06-20 ENCOUNTER — TELEPHONE (OUTPATIENT)
Dept: OBGYN CLINIC | Age: 43
End: 2023-06-20

## 2023-06-20 ENCOUNTER — HOSPITAL ENCOUNTER (OUTPATIENT)
Age: 43
Setting detail: SPECIMEN
Discharge: HOME | End: 2023-06-20

## 2023-06-20 VITALS — BODY MASS INDEX: 34.2 KG/M2 | WEIGHT: 187 LBS

## 2023-06-20 DIAGNOSIS — N89.8 VAGINAL DISCHARGE: Primary | ICD-10-CM

## 2023-06-20 DIAGNOSIS — N89.8 VAGINAL DISCHARGE: ICD-10-CM

## 2023-06-20 PROCEDURE — 99213 OFFICE O/P EST LOW 20 MIN: CPT | Performed by: STUDENT IN AN ORGANIZED HEALTH CARE EDUCATION/TRAINING PROGRAM

## 2023-06-20 NOTE — PROGRESS NOTES
History reviewed. No pertinent surgical history. MEDICATIONS:  Current Outpatient Medications   Medication Sig Dispense Refill    metroNIDAZOLE (METROGEL VAGINAL) 0.75 % vaginal gel One applicator intravaginally twice weekly for 3 months 70 g 2    fluconazole (DIFLUCAN) 150 MG tablet TAKE ONE TABLET BY MOUTH IN A SINGLE DOSE. IF SYMPTOMS PERSIST, CAN TAKE 1 ADDITIONAL DOSE AFTER 72 HOURS. 2 tablet 0    cyclobenzaprine (FLEXERIL) 10 MG tablet Take 1 tablet by mouth as needed for Muscle spasms 30 tablet 0    ibuprofen (ADVIL;MOTRIN) 800 MG tablet Take 1 tablet by mouth every 6 hours as needed for Pain      FLUoxetine (PROZAC) 20 MG capsule Take 1 capsule by mouth in the morning. 90 capsule 3    diclofenac sodium (VOLTAREN) 1 % GEL Apply 4 g topically in the morning and 4 g at noon and 4 g in the evening and 4 g before bedtime. 150 g 3    nicotine (NICODERM CQ) 7 MG/24HR Place 1 patch onto the skin daily for 14 days 14 patch 0     No current facility-administered medications for this visit. ALLERGIES:   Allergies as of 06/20/2023    (No Known Allergies)                                   VITALS:  Vitals:    06/20/23 1434   Weight: 187 lb (84.8 kg)                                                                                                                                                                    PHYSICAL EXAM:   General Appearance: Appears healthy. Alert; in no acute distress. Pleasant. Skin: Skin color, texture, turgor normal. No rashes or lesions. HEENT: normocephalic and atraumatic, Thyroid normal to inspection and palpation  Respiratory: Normal expansion. Clear to auscultation. No rales, rhonchi, or wheezing.   Cardiovascular: normal rate, normal S1 and S2, no gallops, intact distal pulses and no carotid bruits  Breast:  (Chest): N/A  Abdomen: soft, non-tender, non-distended, no right upper quadrant tenderness and no CVA tenderness  Pelvic Exam:   External genitalia: General

## 2023-06-20 NOTE — TELEPHONE ENCOUNTER
Pt states she would like an appt for Nexplanon insertion before 07/06/2023. Please call pt with appt date and time .

## 2023-06-21 DIAGNOSIS — N76.0 BACTERIAL VAGINITIS: Primary | ICD-10-CM

## 2023-06-21 DIAGNOSIS — B96.89 BACTERIAL VAGINITIS: Primary | ICD-10-CM

## 2023-06-21 LAB
C TRACH DNA SPEC QL PROBE+SIG AMP: NEGATIVE
CANDIDA SPECIES, DNA PROBE: NEGATIVE
GARDNERELLA VAGINALIS, DNA PROBE: POSITIVE
N GONORRHOEA DNA SPEC QL PROBE+SIG AMP: NEGATIVE
SOURCE: ABNORMAL
SPECIMEN DESCRIPTION: NORMAL
TRICHOMONAS VAGINALIS DNA: NEGATIVE

## 2023-06-21 RX ORDER — METRONIDAZOLE 500 MG/1
500 TABLET ORAL 2 TIMES DAILY
Qty: 14 TABLET | Refills: 0 | Status: SHIPPED | OUTPATIENT
Start: 2023-06-21 | End: 2023-06-28

## 2023-06-28 NOTE — TELEPHONE ENCOUNTER
Can we do 1:30 pm on 7/6/23? Our current 1:30 can be cancelled.     Thanks,    Lyndsay Pagan, [facility]   6/28/2023, 12:14 PM

## 2023-06-28 NOTE — TELEPHONE ENCOUNTER
Can she do 1:45 pm tomorrow 6/2923? The polypectomy at 1:30 shouldn't take long.     Thanks,    DO Ricardo Sanchez Ob/Gyn   6/28/2023, 8:33 AM

## 2023-06-29 ENCOUNTER — TELEPHONE (OUTPATIENT)
Dept: OBGYN CLINIC | Age: 43
End: 2023-06-29

## 2023-06-29 RX ORDER — METRONIDAZOLE 500 MG/1
500 TABLET ORAL 2 TIMES DAILY
Qty: 14 TABLET | Refills: 0 | Status: SHIPPED | OUTPATIENT
Start: 2023-06-29 | End: 2023-07-06

## 2023-06-29 RX ORDER — FLUCONAZOLE 150 MG/1
150 TABLET ORAL ONCE
Qty: 2 TABLET | Refills: 0 | Status: SHIPPED | OUTPATIENT
Start: 2023-06-29 | End: 2023-06-29

## 2023-06-29 NOTE — TELEPHONE ENCOUNTER
Can 7:45 am on 7/14/23. No unprotected intercourse until that time please.     DO Ricardo Vaca Ob/Gyn   6/29/2023, 5:22 PM

## 2023-07-11 ENCOUNTER — HOSPITAL ENCOUNTER (EMERGENCY)
Age: 43
Discharge: HOME | End: 2023-07-11
Attending: EMERGENCY MEDICINE
Payer: COMMERCIAL

## 2023-07-11 ENCOUNTER — APPOINTMENT (OUTPATIENT)
Dept: GENERAL RADIOLOGY | Age: 43
End: 2023-07-11
Payer: COMMERCIAL

## 2023-07-11 VITALS
OXYGEN SATURATION: 98 % | RESPIRATION RATE: 18 BRPM | BODY MASS INDEX: 34.96 KG/M2 | HEIGHT: 62 IN | TEMPERATURE: 97.6 F | WEIGHT: 190 LBS | DIASTOLIC BLOOD PRESSURE: 87 MMHG | SYSTOLIC BLOOD PRESSURE: 127 MMHG | HEART RATE: 97 BPM

## 2023-07-11 DIAGNOSIS — M79.675 PAIN OF LEFT GREAT TOE: Primary | ICD-10-CM

## 2023-07-11 PROCEDURE — 73630 X-RAY EXAM OF FOOT: CPT

## 2023-07-11 PROCEDURE — 99283 EMERGENCY DEPT VISIT LOW MDM: CPT

## 2023-07-11 RX ORDER — PREDNISONE 20 MG/1
40 TABLET ORAL DAILY
Qty: 10 TABLET | Refills: 0 | Status: SHIPPED | OUTPATIENT
Start: 2023-07-11 | End: 2023-07-16

## 2023-07-11 ASSESSMENT — ENCOUNTER SYMPTOMS
SHORTNESS OF BREATH: 0
COLOR CHANGE: 0

## 2023-08-03 ENCOUNTER — PROCEDURE VISIT (OUTPATIENT)
Dept: OBGYN CLINIC | Age: 43
End: 2023-08-03

## 2023-08-03 VITALS
DIASTOLIC BLOOD PRESSURE: 75 MMHG | WEIGHT: 176 LBS | BODY MASS INDEX: 32.39 KG/M2 | SYSTOLIC BLOOD PRESSURE: 118 MMHG | HEIGHT: 62 IN | HEART RATE: 64 BPM

## 2023-08-03 DIAGNOSIS — Z30.017 NEXPLANON INSERTION: Primary | ICD-10-CM

## 2023-08-03 NOTE — PROGRESS NOTES
[facility] Yuli Read  [address] [place] [address] Cierra Freeman     Date: 8/3/2023    Chief complaint:  Chief Complaint   Patient presents with    Procedure     Nexplanon insertion   humberto n/a  med mut ins. Marylu Ramirez returns to the office today to have  Nexplanon inserted. She has previously been counseled on Nexplanon as well as other hormonal contraceptive options. She has read the information on Nexplanon and has signed the consent. She has a pelvic ultrasound yet to be done ordered by Dr. Kang Mena to rule out uterine fibroids. She otherwise is doing well, desires to proceed and voices no complaints today. UPT done in the office today prior to the procedure was negative. She was placed in the supine and the patient's nondominant arm was flexed at the elbow and externally rotated. The insertion site was then identified about 8-10 cm above the medial epicondyle of the humerus. The insertion site was then marked and a second marked was made at the distal end. Then the area was scrubbed with Betadine. 1% lidocaine was seen used to infiltrate the subcutaneous tissue. The Nexplanon was started uneventfully , intact and in proper alignment. The patient was then asked to palpate the area confirming the insertion. The wound was covered with a Band-Aid and then a light pressure dressing  placed over it. She tolerated procedure well and post insertion instructions and wound care was discussed. ICD-10-CM    1. Nexplanon insertion  Z30.017 [facility] INSERTION DRUG DELIVERY IMPLANT        She left in good condition and will return for a pelvic ultrasound and follow-up with Dr. Kang Mena when necessary    Giovanny Samuel. Rod Dean MD, mph, FACOG.   [facility]

## 2023-08-03 NOTE — PATIENT INSTRUCTIONS
Please schedule the pelvic ultrasound that was ordered by Dr. Viviane Grossman. Return to the office for your next scheduled visit with Dr. Viviane Grossman.

## 2023-08-18 ENCOUNTER — TELEPHONE (OUTPATIENT)
Dept: OBGYN CLINIC | Age: 43
End: 2023-08-18

## 2023-08-18 NOTE — TELEPHONE ENCOUNTER
GYN pt calling for results done on 8/14. Results are done wondering if you could result on those pls.

## 2023-08-18 NOTE — TELEPHONE ENCOUNTER
\"Uterus measures 9.3 x 5.7 x 6.0 and contains many submucosal, intramural and subserosal fibroids. Endometrium measures 1.4 cm. Bilateral ovaries are unremarkable. Recommend making an appointment to discuss plan of care. \"    Thanks,    DO Ricardo Yang Ob/Gyn   8/18/2023, 11:10 AM

## 2023-08-22 ENCOUNTER — OFFICE VISIT (OUTPATIENT)
Dept: PODIATRY | Age: 43
End: 2023-08-22
Payer: COMMERCIAL

## 2023-08-22 ENCOUNTER — HOSPITAL ENCOUNTER (OUTPATIENT)
Age: 43
Setting detail: SPECIMEN
Discharge: HOME | End: 2023-08-22

## 2023-08-22 VITALS — WEIGHT: 185 LBS | HEIGHT: 62 IN | BODY MASS INDEX: 34.04 KG/M2

## 2023-08-22 DIAGNOSIS — B35.1 TOENAIL FUNGUS: Primary | ICD-10-CM

## 2023-08-22 DIAGNOSIS — M79.672 LEFT FOOT PAIN: ICD-10-CM

## 2023-08-22 DIAGNOSIS — M72.2 PLANTAR FASCIAL FIBROMATOSIS: ICD-10-CM

## 2023-08-22 PROCEDURE — 99204 OFFICE O/P NEW MOD 45 MIN: CPT | Performed by: PODIATRIST

## 2023-08-22 RX ORDER — METHYLPREDNISOLONE 4 MG/1
TABLET ORAL
Qty: 1 KIT | Refills: 0 | Status: SHIPPED | OUTPATIENT
Start: 2023-08-22 | End: 2023-08-28

## 2023-08-22 NOTE — PATIENT INSTRUCTIONS
Schedule a Vaccine  When you qualify to receive the vaccine, call the [facility]) COVID-19 Vaccination Hotline to schedule your appointment or to get additional information about the [facility]) locations which are offering the COVID-19 vaccine. To be 94% effective, it's important that you receive two doses of one of the COVID-19 vaccines. -If you are receiving the [place] vaccine, your second shot will be scheduled as close to 21 days after the first shot as possible. -If you are receiving the Moderna vaccine, your second shot will be scheduled as close to 28 days after the first shot as possible. [facility]) COVID-19 Vaccination Hotline: [phone]    Links to [facility]) website and [facility] website:    [email or web address]/[facility]cy-[email or web address]-[email or web address]-[email or web address]    [email or web address]

## 2023-08-22 NOTE — PROGRESS NOTES
surgical history on file. Family History   Problem Relation Age of Onset    Other Mother         possible ovarian Ca 8/03/23 pending pending bx    Hypertension Mother     COPD Mother         smoker    Arthritis Mother     Cirrhosis Father     Alcohol Abuse Father     Other Sister         blood disorder, low RBC ct    Asthma Brother     Inflam Bowel Dis Brother     No Known Problems Brother     No Known Problems Brother     Heart Disease Maternal Grandmother     Heart Failure Maternal Grandmother     Cancer Maternal Grandfather     No Known Problems Paternal Grandmother     No Known Problems Paternal Grandfather        Social History     Tobacco Use    Smoking status: Every Day     Packs/day: 0.10     Years: 30.00     Pack years: 3.00     Types: Cigarettes     Passive exposure: Current    Smokeless tobacco: Never   Substance Use Topics    Alcohol use: Yes     Comment: occasioanlly       Review of Systems    Review of Systems:   History obtained from chart review and the patient  General ROS: negative for - chills, fatigue, fever, night sweats or weight gain  Constitutional: Negative for chills, diaphoresis, fatigue, fever and unexpected weight change. Musculoskeletal: Positive for arthralgias, gait problem and joint swelling. Neurological ROS: negative for - behavioral changes, confusion, headaches or seizures. Negative for weakness and numbness. Dermatological ROS: negative for - mole changes, rash  Cardiovascular: Negative for leg swelling. Gastrointestinal: Negative for constipation, diarrhea, nausea and vomiting. Lower Extremity Physical Examination:   Vitals: There were no vitals filed for this visit. General: AAO x 3 in NAD. Dermatologic Exam:  Skin lesion/ulceration Absent . Skin No rashes or nodules noted. .       Musculoskeletal:     1st MPJ ROM decreased, Bilateral.  Muscle strength 5/5, Bilateral. POP of the left plantar medial calcaneal tuberosity.   Pain increased with

## 2023-08-25 LAB — DERMATOLOGY PATHOLOGY REPORT: NORMAL

## 2023-08-30 ENCOUNTER — TELEPHONE (OUTPATIENT)
Dept: OBGYN CLINIC | Age: 43
End: 2023-08-30

## 2023-08-30 NOTE — TELEPHONE ENCOUNTER
Patient called in stating she has been bleeding heavily since Monday. Patient has the [address] I asked if she has saturated a pad or tampon within an hour and she said yes. She is passing clots as well and not feeling well. I advised patient to go to the ER based on her symptoms. I told patient I would reach out to Dr. Peterson Bar and follow up with her.

## 2023-08-31 ENCOUNTER — HOSPITAL ENCOUNTER (EMERGENCY)
Age: 43
Discharge: HOME | End: 2023-08-31
Attending: EMERGENCY MEDICINE
Payer: COMMERCIAL

## 2023-08-31 VITALS
OXYGEN SATURATION: 100 % | WEIGHT: 182 LBS | TEMPERATURE: 98.9 F | BODY MASS INDEX: 33.49 KG/M2 | RESPIRATION RATE: 16 BRPM | HEIGHT: 62 IN | HEART RATE: 67 BPM | SYSTOLIC BLOOD PRESSURE: 113 MMHG | DIASTOLIC BLOOD PRESSURE: 77 MMHG

## 2023-08-31 DIAGNOSIS — N76.0 BACTERIAL VAGINITIS: ICD-10-CM

## 2023-08-31 DIAGNOSIS — N93.9 VAGINAL BLEEDING: Primary | ICD-10-CM

## 2023-08-31 DIAGNOSIS — B96.89 BACTERIAL VAGINITIS: ICD-10-CM

## 2023-08-31 LAB
AMORPH SED URNS QL MICRO: ABNORMAL
ANION GAP SERPL CALCULATED.3IONS-SCNC: 7 MMOL/L (ref 9–17)
BACTERIA URNS QL MICRO: ABNORMAL
BASOPHILS # BLD: <0.03 K/UL (ref 0–0.2)
BASOPHILS NFR BLD: 0 % (ref 0–2)
BILIRUB UR QL STRIP: NEGATIVE
BUN SERPL-MCNC: 9 MG/DL (ref 6–20)
BUN/CREAT SERPL: 11 (ref 9–20)
CALCIUM SERPL-MCNC: 8.8 MG/DL (ref 8.6–10.4)
CANDIDA SPECIES: NEGATIVE
CHLORIDE SERPL-SCNC: 106 MMOL/L (ref 98–107)
CHP ED QC CHECK: YES
CLARITY UR: ABNORMAL
CO2 SERPL-SCNC: 25 MMOL/L (ref 20–31)
COLOR UR: YELLOW
CREAT SERPL-MCNC: 0.8 MG/DL (ref 0.5–0.9)
EOSINOPHIL # BLD: 0.08 K/UL (ref 0–0.44)
EOSINOPHILS RELATIVE PERCENT: 1 % (ref 1–4)
EPI CELLS #/AREA URNS HPF: ABNORMAL /HPF (ref 0–5)
ERYTHROCYTE [DISTWIDTH] IN BLOOD BY AUTOMATED COUNT: 14.7 % (ref 11.8–14.4)
GARDNERELLA VAGINALIS: POSITIVE
GFR SERPL CREATININE-BSD FRML MDRD: >60 ML/MIN/1.73M2
GLUCOSE SERPL-MCNC: 120 MG/DL (ref 70–99)
GLUCOSE UR STRIP-MCNC: NEGATIVE MG/DL
HCT VFR BLD AUTO: 31.3 % (ref 36.3–47.1)
HGB BLD-MCNC: 9.7 G/DL (ref 11.9–15.1)
HGB UR QL STRIP.AUTO: ABNORMAL
IMM GRANULOCYTES # BLD AUTO: 0.01 K/UL (ref 0–0.3)
IMM GRANULOCYTES NFR BLD: 0 %
KETONES UR STRIP-MCNC: NEGATIVE MG/DL
LEUKOCYTE ESTERASE UR QL STRIP: NEGATIVE
LYMPHOCYTES NFR BLD: 2.03 K/UL (ref 1.1–3.7)
LYMPHOCYTES RELATIVE PERCENT: 34 % (ref 24–43)
MCH RBC QN AUTO: 27.8 PG (ref 25.2–33.5)
MCHC RBC AUTO-ENTMCNC: 31 G/DL (ref 28.4–34.8)
MCV RBC AUTO: 89.7 FL (ref 82.6–102.9)
MONOCYTES NFR BLD: 0.46 K/UL (ref 0.1–1.2)
MONOCYTES NFR BLD: 8 % (ref 3–12)
MUCOUS THREADS URNS QL MICRO: ABNORMAL
NEUTROPHILS NFR BLD: 57 % (ref 36–65)
NEUTS SEG NFR BLD: 3.37 K/UL (ref 1.5–8.1)
NITRITE UR QL STRIP: NEGATIVE
NRBC BLD-RTO: 0 PER 100 WBC
PH UR STRIP: 7.5 [PH] (ref 5–8)
PLATELET # BLD AUTO: 366 K/UL (ref 138–453)
PMV BLD AUTO: 11.1 FL (ref 8.1–13.5)
POTASSIUM SERPL-SCNC: 3.7 MMOL/L (ref 3.7–5.3)
PREGNANCY TEST URINE, POC: NEGATIVE
PROT UR STRIP-MCNC: ABNORMAL MG/DL
RBC # BLD AUTO: 3.49 M/UL (ref 3.95–5.11)
RBC # BLD: ABNORMAL 10*6/UL
RBC #/AREA URNS HPF: ABNORMAL /HPF (ref 0–2)
SODIUM SERPL-SCNC: 138 MMOL/L (ref 135–144)
SOURCE: ABNORMAL
SP GR UR STRIP: 1.02 (ref 1–1.03)
TRICHOMONAS: NEGATIVE
UROBILINOGEN UR STRIP-ACNC: NORMAL EU/DL (ref 0–1)
WBC #/AREA URNS HPF: ABNORMAL /HPF (ref 0–5)
WBC OTHER # BLD: 6 K/UL (ref 3.5–11.3)

## 2023-08-31 PROCEDURE — 87480 CANDIDA DNA DIR PROBE: CPT

## 2023-08-31 PROCEDURE — 80048 BASIC METABOLIC PNL TOTAL CA: CPT

## 2023-08-31 PROCEDURE — 87591 N.GONORRHOEAE DNA AMP PROB: CPT

## 2023-08-31 PROCEDURE — 96374 THER/PROPH/DIAG INJ IV PUSH: CPT

## 2023-08-31 PROCEDURE — 81025 URINE PREGNANCY TEST: CPT

## 2023-08-31 PROCEDURE — 99284 EMERGENCY DEPT VISIT MOD MDM: CPT

## 2023-08-31 PROCEDURE — 85025 COMPLETE CBC W/AUTO DIFF WBC: CPT

## 2023-08-31 PROCEDURE — 87510 GARDNER VAG DNA DIR PROBE: CPT

## 2023-08-31 PROCEDURE — 6360000002 HC RX W HCPCS: Performed by: EMERGENCY MEDICINE

## 2023-08-31 PROCEDURE — 87660 TRICHOMONAS VAGIN DIR PROBE: CPT

## 2023-08-31 PROCEDURE — 2580000003 HC RX 258: Performed by: EMERGENCY MEDICINE

## 2023-08-31 PROCEDURE — 87491 CHLMYD TRACH DNA AMP PROBE: CPT

## 2023-08-31 PROCEDURE — 81001 URINALYSIS AUTO W/SCOPE: CPT

## 2023-08-31 RX ORDER — 0.9 % SODIUM CHLORIDE 0.9 %
500 INTRAVENOUS SOLUTION INTRAVENOUS ONCE
Status: COMPLETED | OUTPATIENT
Start: 2023-08-31 | End: 2023-08-31

## 2023-08-31 RX ORDER — METRONIDAZOLE 500 MG/1
500 TABLET ORAL 2 TIMES DAILY
Qty: 14 TABLET | Refills: 0 | Status: SHIPPED | OUTPATIENT
Start: 2023-08-31

## 2023-08-31 RX ORDER — KETOROLAC TROMETHAMINE 30 MG/ML
30 INJECTION, SOLUTION INTRAMUSCULAR; INTRAVENOUS ONCE
Status: COMPLETED | OUTPATIENT
Start: 2023-08-31 | End: 2023-08-31

## 2023-08-31 RX ADMIN — KETOROLAC TROMETHAMINE 30 MG: 30 INJECTION, SOLUTION INTRAMUSCULAR; INTRAVENOUS at 19:45

## 2023-08-31 RX ADMIN — SODIUM CHLORIDE 500 ML: 0.9 INJECTION, SOLUTION INTRAVENOUS at 19:47

## 2023-08-31 ASSESSMENT — PAIN DESCRIPTION - PAIN TYPE: TYPE: ACUTE PAIN

## 2023-08-31 ASSESSMENT — PAIN DESCRIPTION - FREQUENCY: FREQUENCY: INTERMITTENT

## 2023-08-31 ASSESSMENT — PAIN - FUNCTIONAL ASSESSMENT: PAIN_FUNCTIONAL_ASSESSMENT: 0-10

## 2023-08-31 ASSESSMENT — PAIN DESCRIPTION - DESCRIPTORS: DESCRIPTORS: CRAMPING

## 2023-08-31 ASSESSMENT — PAIN DESCRIPTION - LOCATION: LOCATION: PELVIS

## 2023-08-31 ASSESSMENT — PAIN DESCRIPTION - ORIENTATION: ORIENTATION: LEFT

## 2023-08-31 ASSESSMENT — PAIN SCALES - GENERAL: PAINLEVEL_OUTOF10: 10

## 2023-09-01 ASSESSMENT — ENCOUNTER SYMPTOMS
ANAL BLEEDING: 0
ABDOMINAL PAIN: 1

## 2023-09-01 NOTE — ED NOTES
Pt. To the ED via private auto c/o abd crampign and vaginal bleeding. Pt. States that she is on day 10 of her cycle. Pt. States that earlier today she was going through a pad an hour, pt. States that it has slowed down a little bit but not a lot. Pt. Is A&Ox4 RR even and non-labored, NAD noted.  at bedside. Dr. Donald Fang at bedside to assess.       Rocio Leonard RN  08/31/23 2051

## 2023-09-05 LAB — HCG, PREGNANCY URINE (POC): NEGATIVE

## 2023-09-21 ENCOUNTER — OFFICE VISIT (OUTPATIENT)
Dept: OBGYN CLINIC | Age: 43
End: 2023-09-21
Payer: COMMERCIAL

## 2023-09-21 VITALS
BODY MASS INDEX: 32.92 KG/M2 | DIASTOLIC BLOOD PRESSURE: 73 MMHG | HEART RATE: 90 BPM | WEIGHT: 180 LBS | SYSTOLIC BLOOD PRESSURE: 126 MMHG

## 2023-09-21 DIAGNOSIS — Z01.818 PREOPERATIVE EXAM FOR GYNECOLOGIC SURGERY: Primary | ICD-10-CM

## 2023-09-21 DIAGNOSIS — N93.9 ABNORMAL UTERINE BLEEDING (AUB): ICD-10-CM

## 2023-09-21 DIAGNOSIS — D25.1 INTRAMURAL LEIOMYOMA OF UTERUS: ICD-10-CM

## 2023-09-21 PROCEDURE — 99213 OFFICE O/P EST LOW 20 MIN: CPT | Performed by: STUDENT IN AN ORGANIZED HEALTH CARE EDUCATION/TRAINING PROGRAM

## 2023-09-21 NOTE — PROGRESS NOTES
[facility] [facility]  [address] [facility], [address] [facility],[address]      Pre-operative Orders    Jazzmine Hewitt  1980 9/21/2023  Primary Care Physician: Georgia Schmitt    HISTORY & PHYSICAL      9/21/2023       HOSPITAL: [facility]    HPI: Jazzmine Hewitt is a 36 y.o. female [id number]. Patient has a history of painful cycles and menorrhagia. She had a Nexplanon placed in August and has had very erratic bleeding and cramping since that time. Patient is declining further medical management and desires definitive surgery with a hysterectomy at this time. Discussed with patient R/B/A to hysterectomy. Patient is a candidate for Total Laparoscopic Hysterectomy. Discussed with patient risks of procedure and complications including but not limited to: injury to surrounding structures (bowel, bladder, arteries, nerves, veins), infection, bleeding need for further surgery, post-op complications including DVT, PE, atelectasis, anesthesia complications, and death. Need for blood products reviewed and patient is agreeable to receiving blood if needed. Discussed need for 6 weeks off of work and light activity. Discussed nothing in the vagina and no intercourse for 8 weeks. Patient understands plan for discharge home day of surgery if tolerating PO, urinating, and pain is controlled. Also explained risk of converting from laparoscopic to open if needed. Discussed increased recovery time and hospital stay. Patient states understanding and is agreeable. Ovarian preservation versus removal discussed with patient at length. Patient is requesting preservation of bilateral ovaries at time of hysterectomy. Discussed 10% risk of reoperation at a later time to ovarian pathology. Discussed possible need for removal if abnormal appearance or bleeding occurs. She states understanding. 1. Preoperative exam for gynecologic surgery    2. Abnormal uterine bleeding (AUB)    3.  Intramural leiomyoma

## 2023-09-25 NOTE — TELEPHONE ENCOUNTER
GYN pt calling in for a refill for her aygestin as she went to the ER on 8/31 for abn vaginal bleeding and was started on aygestin and pt is almost out of that and has an EMB scheduled 10/24 and will need a refill to keep from the bleeding coming back. Pt is using [facility] on [address].

## 2023-10-24 ENCOUNTER — PROCEDURE VISIT (OUTPATIENT)
Dept: OBGYN CLINIC | Age: 43
End: 2023-10-24

## 2023-10-24 ENCOUNTER — HOSPITAL ENCOUNTER (OUTPATIENT)
Age: 43
Setting detail: SPECIMEN
Discharge: HOME | End: 2023-10-24

## 2023-10-24 VITALS
DIASTOLIC BLOOD PRESSURE: 89 MMHG | BODY MASS INDEX: 33.03 KG/M2 | HEART RATE: 79 BPM | WEIGHT: 180.6 LBS | SYSTOLIC BLOOD PRESSURE: 128 MMHG

## 2023-10-24 DIAGNOSIS — N93.9 ABNORMAL UTERINE BLEEDING (AUB): Primary | ICD-10-CM

## 2023-10-24 DIAGNOSIS — Z30.46 ENCOUNTER FOR NEXPLANON REMOVAL: ICD-10-CM

## 2023-10-24 RX ORDER — IBUPROFEN 800 MG/1
800 TABLET ORAL EVERY 8 HOURS PRN
Qty: 90 TABLET | Refills: 1 | Status: SHIPPED | OUTPATIENT
Start: 2023-10-24

## 2023-10-24 NOTE — PROGRESS NOTES
[address] [facility]  [address] [facility], [address] [place] [address],[address]      Procedure Note    Kierra Bourgeois  10/24/2023                       Primary Care Physician: DARIUSZ Waite      Subjective:   Kierra Bourgeois 36 y.o. female [id number] is here for previously scheduled EMB due to history of AUB and dysmenorrhea and pre-op for hysterectomy. Also here for Nexplanon removal for AUB. No LMP recorded. . She has no complaints today. Vitals:   Vitals:    10/24/23 1001   BP: 128/89   Site: Left Upper Arm   Position: Sitting   Cuff Size: Large Adult   Pulse: 79   Weight: 81.9 kg (180 lb 9.6 oz)         Procedure: EMB and Nexplanon removal    Indications: history of AUB and dysmenorrhea and pre-op for hysterectomy. AUB from [facility]. Procedure Details: The patient was counseled on the procedure. Risks, benefits and alternatives were reviewed. The patient is aware that this is diagnostic and not curative and a second procedure may be needed. A consent was reviewed and obtained. Endometrial Biopsy  A sterile speculum was placed into the vagina and the cervix was identified. The cervix was cleansed with Betadine. It was not stabilized with an allis clamp and the aspirator was then gently passed into the endometrial cavity. Tissue was obtained and sent to pathology. The patient tolerated the procedure well. Post procedure restrictions were reviewed and given to the patient. All counts and instruments were correct at the end of the procedure. Nexaplanon removal Procedure:  Pt was placed on the exam table with her left arm extended and supinated. The insertion site from the nexplanon was noted and the device was palpated. 3 mL 1% lidocaine injected. After analgesia was confirmed, a scalpel was used to make an incision through her old scar. The device was brought easily through the incision and noted to be intact. It was shown to the patient.  The incision was dressed with steris and a

## 2023-10-26 LAB — SURGICAL PATHOLOGY REPORT: NORMAL

## 2023-10-31 ENCOUNTER — TELEPHONE (OUTPATIENT)
Dept: OBGYN CLINIC | Age: 43
End: 2023-10-31

## 2023-11-01 ENCOUNTER — TELEPHONE (OUTPATIENT)
Dept: OBGYN CLINIC | Age: 43
End: 2023-11-01

## 2023-11-01 NOTE — TELEPHONE ENCOUNTER
Thanks for the update! I will talk with Dr. Eduardo Villegas to see if he can get her on the surgery schedule in January.     Thanks,    DO Ricardo Worley Ob/Gyn   11/1/2023, 10:05 AM

## 2023-11-01 NOTE — TELEPHONE ENCOUNTER
Patient called in stating she would like to cancel her surgery for December 27th. She said Berna Del Rio made an agreement and she is finally going back to work. Patient states she would like to reschedule her surgery for after the first of the year. She said either the week of January 8th or 15th. Patient knows Dr. Ellen Peterson will be on maternity leave and is requesting Dr. Lizzeth Davis to do the surgery. States he has treated her in the past and she really likes him. Surgery scheduling was called and surgery was canceled for know.

## 2023-11-29 DIAGNOSIS — N93.9 ABNORMAL UTERINE BLEEDING (AUB): ICD-10-CM

## 2023-11-29 NOTE — PROGRESS NOTES
[facility] Tad Gamble  [address] [place] [id number]      DATE OF VISIT: 2023    History and Physical    Alvin Diez    :  1980  CHIEF COMPLAINT:    Chief Complaint   Patient presents with    Surgical Consult                    HPI :   Alvin Diez is a 39 y.o. femaleGRAVIDA 4 PARA    Georgia Ferris    Suri Lora is a patient of Dr. Paulette Rondon whom has a history of painful cycles and menorrhagia. She had a Nexplanon placed in August and has had very erratic bleeding and cramping since that time. Patient is declining further medical management and desires definitive surgery with a hysterectomy at this time. She had discussed doing a total laparoscopic hysterectomy and had discussed with her ovarian preservation versus removal discussed with patient at length. Patient is requesting preservation of bilateral ovaries at time of hysterectomy. Discussed 10% risk of reoperation at a later time to ovarian pathology. Discussed possible need for removal if abnormal appearance or bleeding occurs. She states understanding. She was to be scheduled with Dr. Paulette Rondon but was referred to me as she will be on maternity leave. Patient education was done regarding diagnosis and management and she does desire to proceed with definitive hysterectomy. She would like to have her surgery done in early March. She is otherwise without any new complaints.    _____________________________________________________________________  Past Medical History:   Diagnosis Date    Anxiety     Depression     Ovarian cyst                                                                    History reviewed. No pertinent surgical history.   Family History   Problem Relation Age of Onset    Other Mother         possible ovarian Ca 23 pending pending bx    Hypertension Mother     COPD Mother         smoker    Arthritis Mother     Cirrhosis Father

## 2023-11-30 ENCOUNTER — INITIAL CONSULT (OUTPATIENT)
Dept: OBGYN CLINIC | Age: 43
End: 2023-11-30
Payer: COMMERCIAL

## 2023-11-30 VITALS
SYSTOLIC BLOOD PRESSURE: 114 MMHG | DIASTOLIC BLOOD PRESSURE: 73 MMHG | WEIGHT: 184 LBS | HEART RATE: 90 BPM | BODY MASS INDEX: 33.65 KG/M2

## 2023-11-30 DIAGNOSIS — D25.1 INTRAMURAL LEIOMYOMA OF UTERUS: ICD-10-CM

## 2023-11-30 DIAGNOSIS — N92.0 MENORRHAGIA WITH REGULAR CYCLE: Primary | ICD-10-CM

## 2023-11-30 DIAGNOSIS — N94.6 DYSMENORRHEA: ICD-10-CM

## 2023-11-30 PROCEDURE — 99212 OFFICE O/P EST SF 10 MIN: CPT | Performed by: OBSTETRICS & GYNECOLOGY

## 2023-11-30 RX ORDER — IBUPROFEN 800 MG/1
800 TABLET ORAL EVERY 8 HOURS PRN
Qty: 90 TABLET | Refills: 1 | Status: SHIPPED | OUTPATIENT
Start: 2023-11-30

## 2023-11-30 NOTE — PATIENT INSTRUCTIONS
Please read the information given to you on robotic hysterectomy. You may also want to visit the intuitive [employer]. We will call you next week about scheduling your surgery. Please carefully follow all the preoperative instructions that will be given to you. Please schedule a brief preoperative appointment at the end of February for your surgery in March  Please call the office if you have any questions or concerns before or after surgery. Happy holidays!

## 2023-12-30 ENCOUNTER — HOSPITAL ENCOUNTER (EMERGENCY)
Age: 43
Discharge: HOME | End: 2023-12-30

## 2023-12-30 ENCOUNTER — TELEMEDICINE (OUTPATIENT)
Dept: LABOR AND DELIVERY | Age: 43
End: 2023-12-30

## 2023-12-30 DIAGNOSIS — R10.30 LOWER ABDOMINAL PAIN: Primary | ICD-10-CM

## 2023-12-30 NOTE — ED NOTES
Patient registered with the social security number given to nurse. Patient verified identity with 2 identifying factors.

## 2023-12-30 NOTE — PROGRESS NOTES
Patient is 40 yo Patient of Dr. Lawanda Bhatt and Dr. Leanna Nelson with has a long history of menorrhagia and dysmenorrhea. She calls today in tears with significant cramping she took 1600 mg of Ibuprofen on her own accord and has been on 20 mg daily of aygestin for her AUB. She is scheduled for hysterectomy in March due to her schedule. Given the sudden change in her symptoms  recommended she come to the ER for evaluation of her abdominal pain and to evaluate for other causes of acute abdominal pain. She denies any vaginal bleeding at this time and reports she has never had pain like this with her prior painful menses. Will need to evaluate for other causes and then repeat US to assess for any new pathology. Discussed moving up her hysterectomy she is open to this as she is in significant pain. All questions answered. Recommended she come to [facility] as this is where her gyn practices out of.      Latasha Morel MD

## 2023-12-30 NOTE — ED NOTES
Pt and significant other to triage desk  Pt sts she is leaving   Pt and significant other are alert Ox3  Writer advised pt that it is recommended that they she stay and be evaluated; that by leaving she could become sicker/more pain/and could result in death  Pt and significant other verbalize understanding and left ER ambulatory with a steady gait

## 2024-01-02 ENCOUNTER — TELEPHONE (OUTPATIENT)
Dept: OBGYN CLINIC | Age: 44
End: 2024-01-02

## 2024-01-02 ENCOUNTER — OFFICE VISIT (OUTPATIENT)
Dept: PODIATRY | Age: 44
End: 2024-01-02
Payer: COMMERCIAL

## 2024-01-02 VITALS — HEIGHT: 64 IN | WEIGHT: 184 LBS | BODY MASS INDEX: 31.41 KG/M2

## 2024-01-02 DIAGNOSIS — M72.2 PLANTAR FASCIAL FIBROMATOSIS: Primary | ICD-10-CM

## 2024-01-02 DIAGNOSIS — B35.1 TOENAIL FUNGUS: ICD-10-CM

## 2024-01-02 PROCEDURE — 20550 NJX 1 TENDON SHEATH/LIGAMENT: CPT | Performed by: PODIATRIST

## 2024-01-02 PROCEDURE — 99214 OFFICE O/P EST MOD 30 MIN: CPT | Performed by: PODIATRIST

## 2024-01-02 RX ORDER — METRONIDAZOLE 7.5 MG/G
GEL VAGINAL
COMMUNITY
Start: 2023-11-29

## 2024-01-02 RX ORDER — BETAMETHASONE SODIUM PHOSPHATE AND BETAMETHASONE ACETATE 3; 3 MG/ML; MG/ML
6 INJECTION, SUSPENSION INTRA-ARTICULAR; INTRALESIONAL; INTRAMUSCULAR; SOFT TISSUE ONCE
Status: COMPLETED | OUTPATIENT
Start: 2024-01-02 | End: 2024-01-02

## 2024-01-02 RX ORDER — OXYCODONE HYDROCHLORIDE AND ACETAMINOPHEN 5; 325 MG/1; MG/1
TABLET ORAL
COMMUNITY
Start: 2023-12-30

## 2024-01-02 RX ADMIN — BETAMETHASONE SODIUM PHOSPHATE AND BETAMETHASONE ACETATE 6 MG: 3; 3 INJECTION, SUSPENSION INTRA-ARTICULAR; INTRALESIONAL; INTRAMUSCULAR; SOFT TISSUE at 15:54

## 2024-01-02 NOTE — TELEPHONE ENCOUNTER
GYN     Last Seen:11.30.23    Next Appt: N/A    C/O  Ready to schedule Surgery    Pt is ready to schedule for surgery.      Please Advise

## 2024-01-02 NOTE — PROGRESS NOTES
charting after the patients visit    No orders of the defined types were placed in this encounter.    No orders of the defined types were placed in this encounter.       RTC in 3wweek(s).    1/2/2024      Electronically signed by Dallas Mabry DPM on 1/2/2024 at 3:51 PM  1/2/2024

## 2024-01-16 ENCOUNTER — TELEPHONE (OUTPATIENT)
Dept: OBGYN CLINIC | Age: 44
End: 2024-01-16

## 2024-01-16 DIAGNOSIS — Z72.0 TOBACCO USE: ICD-10-CM

## 2024-01-16 NOTE — TELEPHONE ENCOUNTER
GYN     Last Seen:11.30.23    Next Appt: 02.14.24 Surgery     C/O  Medication refill request for NICODERM patch    Pt requested a refill on patches as she would like to stop smoking before her upcoming surgery. Advised pt would reach out to provider.    Please Advise

## 2024-01-24 DIAGNOSIS — N93.9 ABNORMAL UTERINE BLEEDING (AUB): ICD-10-CM

## 2024-01-26 RX ORDER — SODIUM CHLORIDE, SODIUM LACTATE, POTASSIUM CHLORIDE, CALCIUM CHLORIDE 600; 310; 30; 20 MG/100ML; MG/100ML; MG/100ML; MG/100ML
INJECTION, SOLUTION INTRAVENOUS CONTINUOUS
OUTPATIENT
Start: 2024-01-26

## 2024-01-26 NOTE — DISCHARGE INSTRUCTIONS
Pre-operative Instructions    Please arrive at the surgery center by 6:30 AM on 2/14/2024  (or as directed by your surgeon's office). See Directons to Surgery Center below.                FASTING    NOTHING TO EAT OR DRINK AFTER MIDNIGHT the night prior to surgery (This includes gum, candy, mints, chewing tobacco, etc).                 MEDICATIONS    What to STOP: ANY BLOOD THINNING MEDICATION(S) as directed by your surgeon or prescribing physician.  FAILURE TO STOP CERTAIN MEDICATIONS MAY INTERFERE WITH YOUR SCHEDULED SURGERY.      According to the medication list you provided today, PLEASE STOP: ibuprofen (Motrin) and any over the counter herbal supplements    2. What to CONTINUE leading up to your surgery:   Please take all your other daily medications except the medications listed above that you were instructed to hold.    3. What to TAKE MORNING OF SURGERY with SMALL SIP OF WATER: percocet if needed    PLEASE NOTE: ANY \"STOPPED\" MEDICATIONS MAY BE DUE TO CLEANING UP MEDICATION LIST DURING THIS VISIT.                        IF APPLICABLE:  -If you have been given a blood band, you must bring it with you the day of surgery, unclasped.  -Use routine inhalers and bring inhalers the day of surgery.   -Bring C-Pap/Bi-pap with you morning of surgery if planning on staying in the hospital overnight.  -Do not take diabetic medications on the day of surgery.  -Any weekly antidiabetic injections must be stopped one week prior to surgery and plan discussed with prescribing provider.                             OTHER IMPORTANT REMINDERS    1) You may be required to provide a urine sample upon your arrival to the pre-op area, so please take this into consideration.     2) If  NOT planning on staying in the hospital overnight :    A.You will need an adult family member /friend to drive you home after your procedure. Taxi cabs or any form of public transportation ALONE is not acceptable.   -Your  must be 18 years of  age or older and able to sign off on your discharge instructions.     -It is preferable that the friend or family member stay at the hospital throughout your procedure.  B. Someone must remain with you once you have arrived home for the first 24 hours after your surgery if you receive anesthesia or medication.  If you do not have someone to stay with you, your procedure may be cancelled.    4) Do not wear any jewelry or body piercings day of surgery.     5) In case of illness - If you have cold or flu like symptoms (high fever, runny nose, sore throat, cough, etc.) rash, nausea, vomiting, loose stools, and/or recent contact with someone who has a contagious disease (Covid-19, chicken pox, measles, etc.) PLEASE notify your surgeon as soon as possible.       1/26/24  4:09 PM      ___________________  _______________________  Signature (Provider)              Signature (Patient)     Day of Surgery/Procedure    As a patient at [facility] you can expect quality medical and nursing care that is centered on your individual needs.  Our goal is to make your surgical experience as comfortable as possible  .  Directions to the Surgery Center    [facility] is located at [address] [place] [address].   Please pull into the Emergency/Surgery Center parking lot or there is additional parking across the street.  You will enter the facility under through the glass doors and proceed to registration check-in which is right inside the door. Thereafter you will be directed to the Surgery Center ON THE FIRST FLOOR.     Patient Instructions    ·Please shower the night before and the morning of surgery with an antibacterial soap.  Please use the cleaning solution (bottle) given to you the night before your surgery after your shower. Unless otherwise told by your physician, please do not shave legs or any part of your body below your neck the night before or day of your surgery.  You may

## 2024-02-02 ENCOUNTER — HOSPITAL ENCOUNTER (OUTPATIENT)
Dept: PREADMISSION TESTING | Age: 44
End: 2024-02-02
Payer: COMMERCIAL

## 2024-02-02 VITALS
HEIGHT: 61 IN | DIASTOLIC BLOOD PRESSURE: 70 MMHG | OXYGEN SATURATION: 100 % | HEART RATE: 70 BPM | WEIGHT: 175 LBS | RESPIRATION RATE: 14 BRPM | SYSTOLIC BLOOD PRESSURE: 103 MMHG | BODY MASS INDEX: 33.04 KG/M2 | TEMPERATURE: 98.2 F

## 2024-02-02 LAB
ABO + RH BLD: NORMAL
ANION GAP SERPL CALCULATED.3IONS-SCNC: 11 MMOL/L (ref 9–17)
ARM BAND NUMBER: NORMAL
BLOOD BANK SAMPLE EXPIRATION: NORMAL
BLOOD GROUP ANTIBODIES SERPL: NEGATIVE
BUN SERPL-MCNC: 9 MG/DL (ref 6–20)
CALCIUM SERPL-MCNC: 8.2 MG/DL (ref 8.6–10.4)
CHLORIDE SERPL-SCNC: 103 MMOL/L (ref 98–107)
CO2 SERPL-SCNC: 24 MMOL/L (ref 20–31)
CREAT SERPL-MCNC: 0.9 MG/DL (ref 0.5–0.9)
EKG ATRIAL RATE: 73 BPM
EKG P AXIS: 68 DEGREES
EKG P-R INTERVAL: 144 MS
EKG Q-T INTERVAL: 380 MS
EKG QRS DURATION: 82 MS
EKG QTC CALCULATION (BAZETT): 418 MS
EKG R AXIS: 54 DEGREES
EKG T AXIS: 50 DEGREES
EKG VENTRICULAR RATE: 73 BPM
ERYTHROCYTE [DISTWIDTH] IN BLOOD BY AUTOMATED COUNT: 19.1 % (ref 11.8–14.4)
GFR SERPL CREATININE-BSD FRML MDRD: >60 ML/MIN/1.73M2
GLUCOSE SERPL-MCNC: 84 MG/DL (ref 70–99)
HCG SERPL QL: NEGATIVE
HCT VFR BLD AUTO: 30.3 % (ref 36.3–47.1)
HGB BLD-MCNC: 9.1 G/DL (ref 11.9–15.1)
MCH RBC QN AUTO: 22.6 PG (ref 25.2–33.5)
MCHC RBC AUTO-ENTMCNC: 30 G/DL (ref 28.4–34.8)
MCV RBC AUTO: 75.4 FL (ref 82.6–102.9)
NRBC BLD-RTO: 0 PER 100 WBC
PLATELET # BLD AUTO: 579 K/UL (ref 138–453)
PMV BLD AUTO: 12.1 FL (ref 8.1–13.5)
POTASSIUM SERPL-SCNC: 3.9 MMOL/L (ref 3.7–5.3)
RBC # BLD AUTO: 4.02 M/UL (ref 3.95–5.11)
SODIUM SERPL-SCNC: 138 MMOL/L (ref 135–144)
WBC OTHER # BLD: 6.9 K/UL (ref 3.5–11.3)

## 2024-02-02 PROCEDURE — 93005 ELECTROCARDIOGRAM TRACING: CPT | Performed by: ANESTHESIOLOGY

## 2024-02-02 PROCEDURE — 80048 BASIC METABOLIC PNL TOTAL CA: CPT

## 2024-02-02 PROCEDURE — 86901 BLOOD TYPING SEROLOGIC RH(D): CPT

## 2024-02-02 PROCEDURE — 86900 BLOOD TYPING SEROLOGIC ABO: CPT

## 2024-02-02 PROCEDURE — 36415 COLL VENOUS BLD VENIPUNCTURE: CPT

## 2024-02-02 PROCEDURE — 84703 CHORIONIC GONADOTROPIN ASSAY: CPT

## 2024-02-02 PROCEDURE — 86850 RBC ANTIBODY SCREEN: CPT

## 2024-02-02 PROCEDURE — 85027 COMPLETE CBC AUTOMATED: CPT

## 2024-02-02 RX ORDER — PSEUDOEPHEDRINE HCL 30 MG
100 TABLET ORAL EVERY 12 HOURS
COMMUNITY
Start: 2023-12-30

## 2024-02-02 RX ORDER — VITAMIN B COMPLEX
1 CAPSULE ORAL DAILY
COMMUNITY

## 2024-02-02 RX ORDER — GINKGO BILOBA 60 MG
120 TABLET ORAL DAILY
COMMUNITY

## 2024-02-02 RX ORDER — VIT C/B6/B5/MAGNESIUM/HERB 173 50-5-6-5MG
500 CAPSULE ORAL DAILY
COMMUNITY

## 2024-02-02 RX ORDER — THIAMINE HCL 100 MG
500 TABLET ORAL DAILY
COMMUNITY

## 2024-02-02 NOTE — PROGRESS NOTES
Anesthesia Focused Assessment    Hx of anesthesia complications:  no  Family hx of anesthesia complications:  no      Tested positive for Covid-19 in last 8 weeks: no  Upper respiratory infection within the last 4 weeks: no      STOP-BANG Sleep Apnea Questionnaire    SNORE loudly (heard through closed doors)?   No  TIRED, fatigued, sleepy during daytime?    No  OBSERVED stopping breathing during sleep?   No  High blood PRESSURE or being treated?    No    BMI over 35?        No  AGE over 50?        No  NECK circumference over 16\"?     No  GENDER (male)?       No             Total 0  High risk 5-8  Intermediate risk 3-4  Low risk 0-2    ----------------------------------------------------------------------------------------------------------------------  LUÍS                              No  If yes, machine?          DM1                                            No  DM2                   No    Coronary Artery Disease      No  HTN         No  Defib/AICD/Pacemaker               No             Renal Failure                   No  If yes, on dialysis           Active smoker?       Yes, less than 1/4 ppd  Drinks alcohol?       Yes, occasionally  Illicit drugs?        Yes, THC daily  Dentition?        Wears upper and lower braces      Past Medical History:   Diagnosis Date    Anxiety     Arthritis     Knee-left    Depression     Dysmenorrhea     History of bronchitis     Migraine     Muscle spasm     Ovarian cyst     Personal history of other medical treatment     Patient wears oral braces    Under care of team     PCP: Sachi Martel PA-C, Beverley, last visit 8/2023    Under care of team     Podiatry: Dallas Mabry DPM, Tessa, last visit 1/2024    Under care of team     OBGYN: Latoya Buckvania, last visit 12/2023    Uterine fibroid          Patient was evaluated in PAT & anesthesia guidelines were applied.   NPO guidelines, medication instructions and scheduled arrival time were reviewed with patient.  Advised

## 2024-02-05 LAB
EKG ATRIAL RATE: 73 BPM
EKG P AXIS: 68 DEGREES
EKG P-R INTERVAL: 144 MS
EKG Q-T INTERVAL: 380 MS
EKG QRS DURATION: 82 MS
EKG QTC CALCULATION (BAZETT): 418 MS
EKG R AXIS: 54 DEGREES
EKG T AXIS: 50 DEGREES
EKG VENTRICULAR RATE: 73 BPM

## 2024-02-05 PROCEDURE — 93010 ELECTROCARDIOGRAM REPORT: CPT | Performed by: INTERNAL MEDICINE

## 2024-02-05 RX ORDER — PSEUDOEPHEDRINE HCL 30 MG
100 TABLET ORAL EVERY 12 HOURS
Qty: 60 CAPSULE | Refills: 2 | Status: CANCELLED | OUTPATIENT
Start: 2024-02-05

## 2024-02-05 RX ORDER — ENOXAPARIN SODIUM 100 MG/ML
40 INJECTION SUBCUTANEOUS ONCE
OUTPATIENT
Start: 2024-02-05 | End: 2024-02-05

## 2024-02-05 NOTE — TELEPHONE ENCOUNTER
Would like Vale called into pharmacy patient is constipated and would like it called  in instead of buying over counter

## 2024-02-06 RX ORDER — DOCUSATE SODIUM 100 MG/1
100 CAPSULE, LIQUID FILLED ORAL 2 TIMES DAILY
Qty: 60 CAPSULE | Refills: 0 | Status: SHIPPED | OUTPATIENT
Start: 2024-02-06 | End: 2024-03-07

## 2024-02-12 NOTE — DISCHARGE INSTRUCTIONS
your doctor okays it. Pat the incision dry. Do not take a bath for the first 2 weeks, or until your doctor tells you it is okay.     Ask your doctor when it is okay for you to have sex.   Diet    You can eat your normal diet. If your stomach is upset, try bland, low-fat foods like plain rice, broiled chicken, toast, and yogurt.     If your bowel movements are not regular right after surgery, try to avoid constipation and straining. Drink plenty of water. Your doctor may suggest fiber, a stool softener, or a mild laxative.   Medicines    Your doctor will tell you if and when you can restart your medicines. You will also get instructions about taking any new medicines.     If you stopped taking aspirin or some other blood thinner, your doctor will tell you when to start taking it again.     Be safe with medicines. Read and follow all instructions on the label.  If the doctor gave you a prescription medicine for pain, take it as prescribed.  If you are not taking a prescription pain medicine, ask your doctor if you can take an over-the-counter medicine.     If your doctor prescribed antibiotics, take them as directed. Do not stop taking them just because you feel better. You need to take the full course of antibiotics.   Incision care    You may have stitches over the cuts (incisions) the doctor made in your belly.     If you have strips of tape on the cut (incision) the doctor made, leave the tape on for a week or until it falls off.     Wash the area daily with warm, soapy water, and pat it dry. Don't use hydrogen peroxide or alcohol. They can slow healing.     You may cover the area with a gauze bandage if it oozes fluid or rubs against clothing.     Change the bandage every day.   Other instructions    You may have some light vaginal bleeding. Wear sanitary pads if needed. Do not douche or use tampons.   Follow-up care is a key part of your treatment and safety. Be sure to make and go to all appointments, and call  your doctor if you are having problems. It's also a good idea to know your test results and keep a list of the medicines you take.  When should you call for help?   Call 911 anytime you think you may need emergency care. For example, call if:    You passed out (lost consciousness).     You have chest pain, are short of breath, or cough up blood.   Call your doctor now or seek immediate medical care if:    You have pain that does not get better after you take pain medicine.     You cannot pass stools or gas.     You have vaginal discharge that has increased in amount or smells bad.     You are sick to your stomach or cannot drink fluids.     You have loose stitches, or your incision comes open.     Bright red blood has soaked through the bandage over your incision.     You have signs of infection, such as:  Increased pain, swelling, warmth, or redness.  Red streaks leading from the incision.  Pus draining from the incision.  A fever.     You have severe vaginal bleeding. This means that you are soaking through your usual pads every hour for 2 or more hours.     You have signs of a blood clot in your leg (called a deep vein thrombosis), such as:  Pain in your calf, back of the knee, thigh, or groin.  Redness and swelling in your leg or groin.   Watch closely for changes in your health, and be sure to contact your doctor if you have any problems.  Where can you learn more?  Go to https://www.[employer].net/patientEd and enter Q131 to learn more about \"Laparoscopic Hysterectomy: What to Expect at Home.\"  Current as of: April 19, 2023               Content Version: 13.9  © 2006-2023 [employer].   Care instructions adapted under license by [employer]. If you have questions about a medical condition or this instruction, always ask your healthcare professional. [employer] disclaims any warranty or liability for your use of this information.    No alcoholic beverages, no driving or operating

## 2024-02-13 NOTE — H&P
In such cases, another test should be performed with a new specimen   in 48-72 hours.  If early pregnancy is suspected clinically in this setting, correlation   with quantitative serum b-hCG level is suggested.        [employer] has confirmed the use of plasma for this test. This has not been cleared   or approved by the U.S. Food and Drug Administration.  The FDA has determined that such   clearance is not necessary.      Blood Bank Sample Expiration 02/02/2024 02/17/2024,2359   Final    Arm Band Number 02/02/2024 BE [id number]   Final    ABO/Rh 02/02/2024 A POSITIVE   Final    Antibody Screen 02/02/2024 NEGATIVE   Final    Sodium 02/02/2024 138  135 - 144 mmol/L Final    Potassium 02/02/2024 3.9  3.7 - 5.3 mmol/L Final    Chloride 02/02/2024 103  98 - 107 mmol/L Final    CO2 02/02/2024 24  20 - 31 mmol/L Final    Anion Gap 02/02/2024 11  9 - 17 mmol/L Final    Glucose 02/02/2024 84  70 - 99 mg/dL Final    BUN 02/02/2024 9  6 - 20 mg/dL Final    Creatinine 02/02/2024 0.9  0.5 - 0.9 mg/dL Final    Est, Glom Filt Rate 02/02/2024 >60  >60 mL/min/1.73m2 Final    Comment:       These results are not intended for use in patients <18 years of age.        eGFR results are calculated without a race factor using the 2021 CKD-EPI equation.  Careful clinical correlation is recommended, particularly when comparing to results   calculated using previous equations.  The CKD-EPI equation is less accurate in patients with extremes of muscle mass, extra-renal   metabolism of creatine, excessive creatine ingestion, or following therapy that affects   renal tubular secretion.      Calcium 02/02/2024 8.2 (L)  8.6 - 10.4 mg/dL Final    Ventricular Rate 02/02/2024 73  BPM Final    Atrial Rate 02/02/2024 73  BPM Final    P-R Interval 02/02/2024 144  ms Final    QRS Duration 02/02/2024 82  ms Final    Q-T Interval 02/02/2024 380  ms Final    QTc Calculation (Bazett) 02/02/2024 418  ms Final    P Axis 02/02/2024 68  degrees Final    R  Otego 02/02/2024 54  degrees Final    T Axis 02/02/2024 50  degrees Final       DIAGNOSIS & PLAN:  1. Abnormal Uterine Bleeding    - Proceed with planned procedure: Robotic assisted laparoscopic hysterectomy, bilateral salpingectomy, and possible cystoscopy   - Consent signed, on chart.   - The patient is ready for transport to the operative suite.    Counseling:  The patient was counseled on all options both medical and surgical, conservative as well as definitive. She has elected to proceed with the procedure as stated above.     The patient was counseled on the procedure. Risks and complications were reviewed in detail. The patients orders, labs, consents have been completed. The history and physical as well as all supporting surgical documentation will be forwarded to the pre-operative holding area.     The patient is aware that this procedure may not alleviate her symptoms. That there may be a necessity for a second surgery and that there may be an incomplete removal of abnormal tissue.    Inderjit Castro MD  Ob/Gyn Resident  [facility], [facility]  2/14/2024, 8:13 AM

## 2024-02-13 NOTE — PROGRESS NOTES
[facility]  [address]  [address]  [place] [address]        DATE: 2024    Updated history and Physical     Sylvia Weller    :  1980        HPI :   Sylvia Weller is a 43 y.o. femaleGRAVIDA 4 PARA    PCP:Sachi Martel PA     Sylvia is a patient of Dr. Bardales whom has a history of painful cycles and menorrhagia.  She had a Nexplanon placed in August and has had very erratic bleeding and cramping since that time.  Patient is declining further medical management and desires definitive surgery with a hysterectomy at this time.  She had discussed doing a total laparoscopic hysterectomy and had discussed with her ovarian preservation versus removal.. Patient is requesting preservation of bilateral ovaries at time of hysterectomy. Discussed 10% risk of reoperation at a later time to ovarian pathology.  Discussed possible need for removal if abnormal appearance or bleeding occurs. She states understanding.  She was to be scheduled with Dr. Bardales but was referred to me as she will be on maternity leave.  Patient education was done regarding diagnosis and management and she does desire to proceed with definitive hysterectomy.  _____________________________________________________________________  Past Medical History        Past Medical History:   Diagnosis Date    Anxiety      Depression      Ovarian cyst                                                                        Past Surgical History   History reviewed. No pertinent surgical history.     Family History         Family History   Problem Relation Age of Onset    Other Mother           possible ovarian Ca 23 pending pending bx    Hypertension Mother      COPD Mother           smoker    Arthritis Mother      Cirrhosis Father      Alcohol Abuse Father      Other Sister           blood disorder, low RBC ct    Asthma Brother      Inflam Bowel Dis Brother      No

## 2024-02-13 NOTE — BRIEF OP NOTE
Brief Operative Note  Department of Obstetrics and Gynecology  [facility]     Patient: Sylvia Weller   : 1980  MRN: [id number]       Acct: [id number]   Date of Procedure: 24     Pre-operative Diagnosis: 43 y.o. female    Abnormal Uterine Bleeding   Uterine Fibroids   Dysmenorrhea   Anemia  BMI 33    Post-operative Diagnosis:   Abnormal Uterine Bleeding   Uterine Fibroids   Dysmenorrhea   Anemia  BMI 33    Procedure: Robotic assisted laparoscopic hysterectomy, bilateral salpingectomy    Surgeon: Dr. Colon     Assistant(s): Jordyn Mg DO, PGY3; Inderjit Castro MD, PGY2    Anesthesia: general     Findings:  Uterus sounded to 12 cm and was anteverted. Normal appearing external genitalia without lesions. Normal appearing vagina and cervix without lesions. Normal bilateral fallopian tubes, and ovaries. Normal uterus with multiple subserosal and intramural fibroids, largest fibroid fundal and pedunculated, bilateral ureteral peristalsis   Total IV fluids/Blood products:  1800 ml crystalloid  Urine Output:  600 ml    Estimated blood loss:  50ml  Drains:  none  Specimens:  uterus, bilateral   Instrument and Sponge Count: Correct  Complications:  none  Condition:  stable, transferred to post anesthesia recovery    See full operative report for further details.    Jordyn Mg DO  Ob/Gyn Resident  2024, 11:55 AM

## 2024-02-14 ENCOUNTER — ANESTHESIA EVENT (OUTPATIENT)
Dept: OPERATING ROOM | Age: 44
End: 2024-02-14
Payer: COMMERCIAL

## 2024-02-14 ENCOUNTER — ANESTHESIA (OUTPATIENT)
Dept: OPERATING ROOM | Age: 44
End: 2024-02-14
Payer: COMMERCIAL

## 2024-02-14 ENCOUNTER — HOSPITAL ENCOUNTER (OUTPATIENT)
Age: 44
Setting detail: OUTPATIENT SURGERY
Discharge: HOME | End: 2024-02-14
Attending: OBSTETRICS & GYNECOLOGY | Admitting: OBSTETRICS & GYNECOLOGY
Payer: COMMERCIAL

## 2024-02-14 VITALS
SYSTOLIC BLOOD PRESSURE: 142 MMHG | BODY MASS INDEX: 32.47 KG/M2 | DIASTOLIC BLOOD PRESSURE: 82 MMHG | OXYGEN SATURATION: 97 % | RESPIRATION RATE: 18 BRPM | HEIGHT: 61 IN | WEIGHT: 172 LBS | HEART RATE: 80 BPM | TEMPERATURE: 97 F

## 2024-02-14 DIAGNOSIS — D25.0 SUBMUCOUS LEIOMYOMA OF UTERUS: ICD-10-CM

## 2024-02-14 DIAGNOSIS — Z98.890 POST-OPERATIVE STATE: Primary | ICD-10-CM

## 2024-02-14 DIAGNOSIS — N93.9 ABNORMAL UTERINE BLEEDING: ICD-10-CM

## 2024-02-14 LAB — HCG, PREGNANCY URINE (POC): NEGATIVE

## 2024-02-14 PROCEDURE — 6360000002 HC RX W HCPCS: Performed by: OBSTETRICS & GYNECOLOGY

## 2024-02-14 PROCEDURE — S2900 ROBOTIC SURGICAL SYSTEM: HCPCS | Performed by: OBSTETRICS & GYNECOLOGY

## 2024-02-14 PROCEDURE — 2580000003 HC RX 258: Performed by: ANESTHESIOLOGY

## 2024-02-14 PROCEDURE — 3600000019 HC SURGERY ROBOT ADDTL 15MIN: Performed by: OBSTETRICS & GYNECOLOGY

## 2024-02-14 PROCEDURE — 6360000002 HC RX W HCPCS: Performed by: ANESTHESIOLOGY

## 2024-02-14 PROCEDURE — 81025 URINE PREGNANCY TEST: CPT

## 2024-02-14 PROCEDURE — 3700000001 HC ADD 15 MINUTES (ANESTHESIA): Performed by: OBSTETRICS & GYNECOLOGY

## 2024-02-14 PROCEDURE — 88307 TISSUE EXAM BY PATHOLOGIST: CPT

## 2024-02-14 PROCEDURE — 7100000010 HC PHASE II RECOVERY - FIRST 15 MIN: Performed by: OBSTETRICS & GYNECOLOGY

## 2024-02-14 PROCEDURE — 2580000003 HC RX 258

## 2024-02-14 PROCEDURE — 58552 LAPARO-VAG HYST INCL T/O: CPT | Performed by: OBSTETRICS & GYNECOLOGY

## 2024-02-14 PROCEDURE — 3600000009 HC SURGERY ROBOT BASE: Performed by: OBSTETRICS & GYNECOLOGY

## 2024-02-14 PROCEDURE — 6360000002 HC RX W HCPCS

## 2024-02-14 PROCEDURE — 2500000003 HC RX 250 WO HCPCS

## 2024-02-14 PROCEDURE — 2709999900 HC NON-CHARGEABLE SUPPLY: Performed by: OBSTETRICS & GYNECOLOGY

## 2024-02-14 PROCEDURE — 2580000003 HC RX 258: Performed by: OBSTETRICS & GYNECOLOGY

## 2024-02-14 PROCEDURE — 7100000001 HC PACU RECOVERY - ADDTL 15 MIN: Performed by: OBSTETRICS & GYNECOLOGY

## 2024-02-14 PROCEDURE — 7100000000 HC PACU RECOVERY - FIRST 15 MIN: Performed by: OBSTETRICS & GYNECOLOGY

## 2024-02-14 PROCEDURE — 3700000000 HC ANESTHESIA ATTENDED CARE: Performed by: OBSTETRICS & GYNECOLOGY

## 2024-02-14 PROCEDURE — 6370000000 HC RX 637 (ALT 250 FOR IP): Performed by: ANESTHESIOLOGY

## 2024-02-14 PROCEDURE — 7100000011 HC PHASE II RECOVERY - ADDTL 15 MIN: Performed by: OBSTETRICS & GYNECOLOGY

## 2024-02-14 RX ORDER — SODIUM CHLORIDE, SODIUM LACTATE, POTASSIUM CHLORIDE, CALCIUM CHLORIDE 600; 310; 30; 20 MG/100ML; MG/100ML; MG/100ML; MG/100ML
INJECTION, SOLUTION INTRAVENOUS CONTINUOUS
Status: DISCONTINUED | OUTPATIENT
Start: 2024-02-14 | End: 2024-02-14 | Stop reason: HOSPADM

## 2024-02-14 RX ORDER — LABETALOL HYDROCHLORIDE 5 MG/ML
INJECTION, SOLUTION INTRAVENOUS PRN
Status: DISCONTINUED | OUTPATIENT
Start: 2024-02-14 | End: 2024-02-14 | Stop reason: SDUPTHER

## 2024-02-14 RX ORDER — ENOXAPARIN SODIUM 100 MG/ML
40 INJECTION SUBCUTANEOUS ONCE
Status: COMPLETED | OUTPATIENT
Start: 2024-02-14 | End: 2024-02-14

## 2024-02-14 RX ORDER — PROPOFOL 10 MG/ML
INJECTION, EMULSION INTRAVENOUS PRN
Status: DISCONTINUED | OUTPATIENT
Start: 2024-02-14 | End: 2024-02-14 | Stop reason: SDUPTHER

## 2024-02-14 RX ORDER — IBUPROFEN 600 MG/1
600 TABLET ORAL EVERY 6 HOURS PRN
Qty: 60 TABLET | Refills: 0 | Status: ON HOLD | OUTPATIENT
Start: 2024-02-14 | End: 2024-02-19 | Stop reason: HOSPADM

## 2024-02-14 RX ORDER — ACETAMINOPHEN 500 MG
1000 TABLET ORAL EVERY 6 HOURS PRN
Qty: 60 TABLET | Refills: 0 | Status: SHIPPED | OUTPATIENT
Start: 2024-02-14 | End: 2024-03-15

## 2024-02-14 RX ORDER — ROCURONIUM BROMIDE 10 MG/ML
INJECTION, SOLUTION INTRAVENOUS PRN
Status: DISCONTINUED | OUTPATIENT
Start: 2024-02-14 | End: 2024-02-14 | Stop reason: SDUPTHER

## 2024-02-14 RX ORDER — SODIUM CHLORIDE 0.9 % (FLUSH) 0.9 %
5-40 SYRINGE (ML) INJECTION PRN
Status: DISCONTINUED | OUTPATIENT
Start: 2024-02-14 | End: 2024-02-14 | Stop reason: HOSPADM

## 2024-02-14 RX ORDER — LIDOCAINE HYDROCHLORIDE 10 MG/ML
INJECTION, SOLUTION EPIDURAL; INFILTRATION; INTRACAUDAL; PERINEURAL PRN
Status: DISCONTINUED | OUTPATIENT
Start: 2024-02-14 | End: 2024-02-14 | Stop reason: SDUPTHER

## 2024-02-14 RX ORDER — KETAMINE HCL IN NACL, ISO-OSM 100MG/10ML
SYRINGE (ML) INJECTION PRN
Status: DISCONTINUED | OUTPATIENT
Start: 2024-02-14 | End: 2024-02-14 | Stop reason: SDUPTHER

## 2024-02-14 RX ORDER — SODIUM CHLORIDE, SODIUM LACTATE, POTASSIUM CHLORIDE, CALCIUM CHLORIDE 600; 310; 30; 20 MG/100ML; MG/100ML; MG/100ML; MG/100ML
INJECTION, SOLUTION INTRAVENOUS CONTINUOUS PRN
Status: DISCONTINUED | OUTPATIENT
Start: 2024-02-14 | End: 2024-02-14 | Stop reason: SDUPTHER

## 2024-02-14 RX ORDER — ACETAMINOPHEN 500 MG
1000 TABLET ORAL ONCE
Status: COMPLETED | OUTPATIENT
Start: 2024-02-14 | End: 2024-02-14

## 2024-02-14 RX ORDER — SODIUM CHLORIDE 0.9 % (FLUSH) 0.9 %
5-40 SYRINGE (ML) INJECTION EVERY 12 HOURS SCHEDULED
Status: DISCONTINUED | OUTPATIENT
Start: 2024-02-14 | End: 2024-02-14 | Stop reason: HOSPADM

## 2024-02-14 RX ORDER — MAGNESIUM HYDROXIDE 1200 MG/15ML
LIQUID ORAL CONTINUOUS PRN
Status: DISCONTINUED | OUTPATIENT
Start: 2024-02-14 | End: 2024-02-14 | Stop reason: HOSPADM

## 2024-02-14 RX ORDER — FENTANYL CITRATE 50 UG/ML
INJECTION, SOLUTION INTRAMUSCULAR; INTRAVENOUS PRN
Status: DISCONTINUED | OUTPATIENT
Start: 2024-02-14 | End: 2024-02-14 | Stop reason: SDUPTHER

## 2024-02-14 RX ORDER — AMOXICILLIN 250 MG
1 CAPSULE ORAL DAILY
Qty: 60 TABLET | Refills: 0 | Status: SHIPPED | OUTPATIENT
Start: 2024-02-14 | End: 2024-04-14

## 2024-02-14 RX ORDER — DIPHENHYDRAMINE HYDROCHLORIDE 50 MG/ML
INJECTION INTRAMUSCULAR; INTRAVENOUS PRN
Status: DISCONTINUED | OUTPATIENT
Start: 2024-02-14 | End: 2024-02-14 | Stop reason: SDUPTHER

## 2024-02-14 RX ORDER — ONDANSETRON 4 MG/1
4 TABLET, ORALLY DISINTEGRATING ORAL 3 TIMES DAILY PRN
Qty: 21 TABLET | Refills: 0 | Status: SHIPPED | OUTPATIENT
Start: 2024-02-14

## 2024-02-14 RX ORDER — OXYCODONE HYDROCHLORIDE 5 MG/1
5 TABLET ORAL EVERY 6 HOURS PRN
Qty: 15 TABLET | Refills: 0 | Status: SHIPPED | OUTPATIENT
Start: 2024-02-14 | End: 2024-02-19

## 2024-02-14 RX ORDER — PROCHLORPERAZINE EDISYLATE 5 MG/ML
5 INJECTION INTRAMUSCULAR; INTRAVENOUS
Status: DISCONTINUED | OUTPATIENT
Start: 2024-02-14 | End: 2024-02-14 | Stop reason: HOSPADM

## 2024-02-14 RX ORDER — METOCLOPRAMIDE HYDROCHLORIDE 5 MG/ML
10 INJECTION INTRAMUSCULAR; INTRAVENOUS
Status: DISCONTINUED | OUTPATIENT
Start: 2024-02-14 | End: 2024-02-14 | Stop reason: HOSPADM

## 2024-02-14 RX ORDER — MORPHINE SULFATE 1 MG/ML
INJECTION, SOLUTION EPIDURAL; INTRATHECAL; INTRAVENOUS PRN
Status: DISCONTINUED | OUTPATIENT
Start: 2024-02-14 | End: 2024-02-14 | Stop reason: SDUPTHER

## 2024-02-14 RX ORDER — LABETALOL HYDROCHLORIDE 5 MG/ML
10 INJECTION, SOLUTION INTRAVENOUS
Status: DISCONTINUED | OUTPATIENT
Start: 2024-02-14 | End: 2024-02-14 | Stop reason: HOSPADM

## 2024-02-14 RX ORDER — SIMETHICONE 80 MG
80 TABLET,CHEWABLE ORAL 4 TIMES DAILY PRN
Qty: 60 TABLET | Refills: 0 | Status: SHIPPED | OUTPATIENT
Start: 2024-02-14

## 2024-02-14 RX ORDER — PREGABALIN 50 MG/1
50 CAPSULE ORAL ONCE
Status: COMPLETED | OUTPATIENT
Start: 2024-02-14 | End: 2024-02-14

## 2024-02-14 RX ORDER — DEXAMETHASONE SODIUM PHOSPHATE 10 MG/ML
INJECTION INTRAMUSCULAR; INTRAVENOUS PRN
Status: DISCONTINUED | OUTPATIENT
Start: 2024-02-14 | End: 2024-02-14 | Stop reason: SDUPTHER

## 2024-02-14 RX ORDER — SODIUM CHLORIDE 9 MG/ML
INJECTION, SOLUTION INTRAVENOUS PRN
Status: DISCONTINUED | OUTPATIENT
Start: 2024-02-14 | End: 2024-02-14 | Stop reason: HOSPADM

## 2024-02-14 RX ORDER — SCOLOPAMINE TRANSDERMAL SYSTEM 1 MG/1
1 PATCH, EXTENDED RELEASE TRANSDERMAL
Status: DISCONTINUED | OUTPATIENT
Start: 2024-02-14 | End: 2024-02-14 | Stop reason: HOSPADM

## 2024-02-14 RX ORDER — ONDANSETRON 2 MG/ML
INJECTION INTRAMUSCULAR; INTRAVENOUS PRN
Status: DISCONTINUED | OUTPATIENT
Start: 2024-02-14 | End: 2024-02-14 | Stop reason: SDUPTHER

## 2024-02-14 RX ORDER — MIDAZOLAM HYDROCHLORIDE 1 MG/ML
INJECTION INTRAMUSCULAR; INTRAVENOUS PRN
Status: DISCONTINUED | OUTPATIENT
Start: 2024-02-14 | End: 2024-02-14 | Stop reason: SDUPTHER

## 2024-02-14 RX ORDER — FENTANYL CITRATE 50 UG/ML
25 INJECTION, SOLUTION INTRAMUSCULAR; INTRAVENOUS EVERY 5 MIN PRN
Status: DISCONTINUED | OUTPATIENT
Start: 2024-02-14 | End: 2024-02-14 | Stop reason: HOSPADM

## 2024-02-14 RX ADMIN — MIDAZOLAM 2 MG: 1 INJECTION INTRAMUSCULAR; INTRAVENOUS at 08:35

## 2024-02-14 RX ADMIN — PREGABALIN 50 MG: 50 CAPSULE ORAL at 07:51

## 2024-02-14 RX ADMIN — SUGAMMADEX 400 MG: 100 INJECTION, SOLUTION INTRAVENOUS at 11:39

## 2024-02-14 RX ADMIN — FENTANYL CITRATE 50 MCG: 0.05 INJECTION, SOLUTION INTRAMUSCULAR; INTRAVENOUS at 08:52

## 2024-02-14 RX ADMIN — SODIUM CHLORIDE, POTASSIUM CHLORIDE, SODIUM LACTATE AND CALCIUM CHLORIDE: 600; 310; 30; 20 INJECTION, SOLUTION INTRAVENOUS at 08:50

## 2024-02-14 RX ADMIN — LIDOCAINE HYDROCHLORIDE 50 MG: 10 INJECTION, SOLUTION EPIDURAL; INFILTRATION; INTRACAUDAL; PERINEURAL at 08:46

## 2024-02-14 RX ADMIN — SODIUM CHLORIDE, POTASSIUM CHLORIDE, SODIUM LACTATE AND CALCIUM CHLORIDE: 600; 310; 30; 20 INJECTION, SOLUTION INTRAVENOUS at 08:36

## 2024-02-14 RX ADMIN — Medication 2.5 MG: at 11:01

## 2024-02-14 RX ADMIN — ROCURONIUM BROMIDE 20 MG: 10 INJECTION, SOLUTION INTRAVENOUS at 10:40

## 2024-02-14 RX ADMIN — PHENYLEPHRINE HYDROCHLORIDE 80 MCG: 10 INJECTION INTRAVENOUS at 08:57

## 2024-02-14 RX ADMIN — ROCURONIUM BROMIDE 20 MG: 10 INJECTION, SOLUTION INTRAVENOUS at 09:59

## 2024-02-14 RX ADMIN — Medication 10 MG: at 09:37

## 2024-02-14 RX ADMIN — ONDANSETRON 4 MG: 2 INJECTION INTRAMUSCULAR; INTRAVENOUS at 11:29

## 2024-02-14 RX ADMIN — ACETAMINOPHEN 1000 MG: 500 TABLET ORAL at 07:51

## 2024-02-14 RX ADMIN — Medication 10 MG: at 09:35

## 2024-02-14 RX ADMIN — FENTANYL CITRATE 100 MCG: 0.05 INJECTION, SOLUTION INTRAMUSCULAR; INTRAVENOUS at 08:46

## 2024-02-14 RX ADMIN — Medication 30 MG: at 09:10

## 2024-02-14 RX ADMIN — HYDROMORPHONE HYDROCHLORIDE 0.5 MG: 1 INJECTION, SOLUTION INTRAMUSCULAR; INTRAVENOUS; SUBCUTANEOUS at 15:32

## 2024-02-14 RX ADMIN — ROCURONIUM BROMIDE 50 MG: 10 INJECTION, SOLUTION INTRAVENOUS at 08:47

## 2024-02-14 RX ADMIN — Medication 2000 MG: at 09:00

## 2024-02-14 RX ADMIN — MORPHINE SULFATE 4 MG: 1 INJECTION, SOLUTION EPIDURAL; INTRATHECAL; INTRAVENOUS at 10:05

## 2024-02-14 RX ADMIN — DEXAMETHASONE SODIUM PHOSPHATE 5 MG: 10 INJECTION INTRAMUSCULAR; INTRAVENOUS at 09:00

## 2024-02-14 RX ADMIN — SODIUM CHLORIDE, POTASSIUM CHLORIDE, SODIUM LACTATE AND CALCIUM CHLORIDE: 600; 310; 30; 20 INJECTION, SOLUTION INTRAVENOUS at 07:40

## 2024-02-14 RX ADMIN — FENTANYL CITRATE 50 MCG: 0.05 INJECTION, SOLUTION INTRAMUSCULAR; INTRAVENOUS at 09:12

## 2024-02-14 RX ADMIN — MORPHINE SULFATE 2 MG: 1 INJECTION, SOLUTION EPIDURAL; INTRATHECAL; INTRAVENOUS at 11:01

## 2024-02-14 RX ADMIN — SODIUM CHLORIDE, POTASSIUM CHLORIDE, SODIUM LACTATE AND CALCIUM CHLORIDE: 600; 310; 30; 20 INJECTION, SOLUTION INTRAVENOUS at 13:40

## 2024-02-14 RX ADMIN — HYDROMORPHONE HYDROCHLORIDE 0.5 MG: 1 INJECTION, SOLUTION INTRAMUSCULAR; INTRAVENOUS; SUBCUTANEOUS at 13:28

## 2024-02-14 RX ADMIN — HYDROMORPHONE HYDROCHLORIDE 0.5 MG: 1 INJECTION, SOLUTION INTRAMUSCULAR; INTRAVENOUS; SUBCUTANEOUS at 12:25

## 2024-02-14 RX ADMIN — PROPOFOL 200 MG: 10 INJECTION, EMULSION INTRAVENOUS at 08:46

## 2024-02-14 RX ADMIN — ROCURONIUM BROMIDE 20 MG: 10 INJECTION, SOLUTION INTRAVENOUS at 09:12

## 2024-02-14 RX ADMIN — DIPHENHYDRAMINE HYDROCHLORIDE 12.5 MG: 50 INJECTION INTRAMUSCULAR; INTRAVENOUS at 09:00

## 2024-02-14 RX ADMIN — ENOXAPARIN SODIUM 40 MG: 100 INJECTION SUBCUTANEOUS at 07:51

## 2024-02-14 RX ADMIN — Medication 2.5 MG: at 11:18

## 2024-02-14 RX ADMIN — SODIUM CHLORIDE, POTASSIUM CHLORIDE, SODIUM LACTATE AND CALCIUM CHLORIDE: 600; 310; 30; 20 INJECTION, SOLUTION INTRAVENOUS at 11:29

## 2024-02-14 RX ADMIN — Medication 5 MG: at 10:40

## 2024-02-14 RX ADMIN — FENTANYL CITRATE 50 MCG: 0.05 INJECTION, SOLUTION INTRAMUSCULAR; INTRAVENOUS at 09:28

## 2024-02-14 ASSESSMENT — PAIN DESCRIPTION - LOCATION
LOCATION: ABDOMEN

## 2024-02-14 ASSESSMENT — PAIN SCALES - GENERAL
PAINLEVEL_OUTOF10: 5
PAINLEVEL_OUTOF10: 7
PAINLEVEL_OUTOF10: 7
PAINLEVEL_OUTOF10: 10
PAINLEVEL_OUTOF10: 0

## 2024-02-14 ASSESSMENT — PAIN DESCRIPTION - DESCRIPTORS: DESCRIPTORS: ACHING

## 2024-02-14 ASSESSMENT — PAIN - FUNCTIONAL ASSESSMENT: PAIN_FUNCTIONAL_ASSESSMENT: NONE - DENIES PAIN

## 2024-02-14 NOTE — ANESTHESIA PRE PROCEDURE
Department of Anesthesiology  Preprocedure Note       Name:  Sylvia Weller   Age:  43 y.o.  :  1980                                          MRN:  [id number]         Date:  2024      Surgeon: Surgeon(s):  Manuel Colon MD    Procedure: Procedure(s):  XI ROBOTIC LAPAROSCOPIC HYSTERECTOMY, REMOVAL OF TUBES, POSSIBLE OPEN  ABDOMINAL HYSTERECTOMY, POSSIBLE CYSTOSCOPY    Medications prior to admission:   Prior to Admission medications    Medication Sig Start Date End Date Taking? Authorizing Provider   docusate sodium (COLACE) 100 MG capsule Take 1 capsule by mouth 2 times daily 2/6/24 3/7/24  Manuel Colon MD   Magnesium 500 MG TABS Take 500 mg by mouth daily    Heriberto Qiu MD   turmeric 500 MG CAPS Take 1 capsule by mouth daily    Heriberto Qiu MD   b complex vitamins capsule Take 1 capsule by mouth daily    Heriberto Qiu MD   vitamin D-3 (CHOLECALCIFEROL) 125 MCG (5000 UT) TABS Take 1 tablet by mouth daily    Heriberto Qiu MD   Ginkgo 60 MG TABS Take 120 mg by mouth daily    Heriberto Qiu MD   Probiotic Product (PROBIOTIC BLEND PO) Take by mouth daily    Heriberto Qiu MD   COLLAGEN PO Take 2,500 mg by mouth daily    Heriberto Qiu MD   Cyanocobalamin (VITAMIN B 12 PO) Take 5,000 mcg by mouth daily    Heriberto Qiu MD   ASHWAGANDHA PO Take 1,300 mg by mouth    Heriberto Qiu MD   ALBUTEROL IN Inhale into the lungs as needed    Heriberto Qiu MD   norethindrone (AYGESTIN) 5 MG tablet Take 1 tablet by mouth daily Take 4 tablets by mouth on the first day, 3 tablets by mouth on the second day, 2 tablets by mouth on the third day, and 1 tablet every day after until seen by OB/GYN.  Patient taking differently: Take 4 tablets by mouth daily Take 4 tablets by mouth on the first day, 3 tablets by mouth on the second day, 2 tablets by mouth on the third day, and 1 tablet every day after until seen by OB/GYN. 24

## 2024-02-14 NOTE — ANESTHESIA POSTPROCEDURE EVALUATION
Department of Anesthesiology  Postprocedure Note    Patient: Sylvia Weller  MRN: [id number]  YOB: 1980  Date of evaluation: 2/14/2024    Procedure Summary     Date: 02/14/24 Room / Location: [address]    Anesthesia Start: 0825 Anesthesia Stop: 1153    Procedure: XI ROBOTIC LAPAROSCOPIC HYSTERECTOMY, BILATERAL SALPINGECTOMY, LYSIS OF ADHESION Diagnosis:       Submucous leiomyoma of uterus      Abnormal uterine bleeding      (Submucous leiomyoma of uterus [D25.0])      (Abnormal uterine bleeding [N93.9])    Surgeons: Manuel Colon MD Responsible Provider: Joseluis Hodges MD    Anesthesia Type: general ASA Status: 2          Anesthesia Type: No value filed.    Nam Phase I: Nam Score: 9    Nam Phase II:      Anesthesia Post Evaluation    Patient location during evaluation: PACU  Patient participation: complete - patient participated  Level of consciousness: awake and alert  Airway patency: patent  Nausea & Vomiting: no nausea and no vomiting  Cardiovascular status: blood pressure returned to baseline  Respiratory status: acceptable  Hydration status: euvolemic  Comments: No known anesthesia related complication  Multimodal analgesia pain management approach  Pain management: adequate        No notable events documented.

## 2024-02-14 NOTE — OP NOTE
Operative Note  Department of Obstetrics and Gynecology  [facility]       Patient: Sylvia Weller   : 1980  MRN: [id number]       Acct: [id number]   PCP: Sachi Martel PA  Date of Procedure: 24    Pre-operative Diagnosis: 43 y.o. female     Abnormal Uterine Bleeding   Uterine Fibroids   Dysmenorrhea   Anemia  BMI 33     Post-operative Diagnosis:   Abnormal Uterine Bleeding   Uterine Fibroids   Dysmenorrhea   Anemia  BMI 33     Procedure: Robotic assisted laparoscopic hysterectomy, bilateral salpingectomy     Surgeon: Dr. Colon     Assistant(s): Jordyn Mg DO, PGY3; Inderjit Castro MD, PGY2     Anesthesia: general     Indications: Sylvia Weller 43 y.o. female presents for surgical management of abnormal uterine bleeding (AUB), uterine fibroids, and painful menstrual cylces. The patient has failed LARC therapy with Nexplanon and desires definitive surgical management at this time. US on 23 revealed Uterus measures 12.9x8.8 cm, endometrium poorly defined approximately 15 mm, in addition, multiple masses throughout the myometrium, findings consistent with multi fibroid uterus, the largest of which measures 7.5 x 5.0 x 5.1 cm within the fundus. EMB performed on 10/24/23 and was benign. Most recent pap smear was negative for intraepithelial lesion or malignancy on 22. Patient has no complaint on arrival today and would like to proceed with surgical management.     Procedure Details   The patient was seen in the pre-op room. The risks, benefits, complications, treatment options, and expected outcomes were discussed with the patient. The patient concurred with the proposed plan, giving informed consent. The patient was taken to the Operating Room and identified as Sylvia Weller and the procedure was verified. The patient received preoperative antibiotics. A Time Out was held and the above information confirmed.     The patient underwent general endotracheal anesthesia  sponge. All pedicles were inspected with excellent hemostasis noted. There was no other intra-abdominal pathology.      All instruments were removed from the abdomen under direct visualization. The daVinci was undocked and removed from the operative field.  All CO2 was removed from the patient's abdomen and trocars were removed.  The skin was closed using 4-0 Monocryl in the subcuticular fashion and covered with dermabond.  Irwin catheter was removed. Sponge, lap, needle count, and instrument counts were correct x 2.  The patient was extubated and taken to the post operative recovery unit in good condition.      Dr. Colon was present for the entire operation.    Findings:  Uterus sounded to 12 cm and was anteverted. Normal appearing external genitalia without lesions. Normal appearing vagina and cervix without lesions. Normal bilateral fallopian tubes, and ovaries. Normal uterus with multiple subserosal and intramural fibroids, largest fibroid fundal and pedunculated, bilateral ureteral peristalsis   Total IV fluids/Blood products:  1800 ml crystalloid  Urine Output:  600 ml    Estimated blood loss:  50ml  Drains:  none  Specimens:  uterus, bilateral   Instrument and Sponge Count: Correct  Complications:  none  Condition:  stable, transferred to post anesthesia recovery    Jordyn Mg DO  Ob/Gyn Resident  2/14/2024, 11:58 AM

## 2024-02-15 ENCOUNTER — TELEPHONE (OUTPATIENT)
Dept: OBGYN CLINIC | Age: 44
End: 2024-02-15

## 2024-02-15 NOTE — TELEPHONE ENCOUNTER
GYN pt had laparoscopic hyst calling in stating there is a knot at the incision. No extra pain no discharge. Just curious to what it could be. I let pt know this is normal and to just monitor it at this time and if it does not go down or get better in the next few days to give us a return call.     Pt verbalized understanding.

## 2024-02-16 ENCOUNTER — APPOINTMENT (OUTPATIENT)
Dept: INTERVENTIONAL RADIOLOGY/VASCULAR | Age: 44
DRG: 394 | End: 2024-02-16
Payer: COMMERCIAL

## 2024-02-16 ENCOUNTER — APPOINTMENT (OUTPATIENT)
Dept: CT IMAGING | Age: 44
DRG: 394 | End: 2024-02-16
Payer: COMMERCIAL

## 2024-02-16 ENCOUNTER — HOSPITAL ENCOUNTER (INPATIENT)
Age: 44
LOS: 3 days | Discharge: HOME | DRG: 394 | End: 2024-02-19
Attending: EMERGENCY MEDICINE | Admitting: OBSTETRICS & GYNECOLOGY
Payer: COMMERCIAL

## 2024-02-16 DIAGNOSIS — N13.4 HYDROURETER: ICD-10-CM

## 2024-02-16 DIAGNOSIS — N13.30 HYDRONEPHROSIS, UNSPECIFIED HYDRONEPHROSIS TYPE: Primary | ICD-10-CM

## 2024-02-16 DIAGNOSIS — N17.9 AKI (ACUTE KIDNEY INJURY) (HCC): ICD-10-CM

## 2024-02-16 LAB
ALBUMIN SERPL-MCNC: 3.9 G/DL (ref 3.5–5.2)
ALBUMIN/GLOB SERPL: 1.3 {RATIO} (ref 1–2.5)
ALP SERPL-CCNC: 37 U/L (ref 35–104)
ALT SERPL-CCNC: 13 U/L (ref 5–33)
ANION GAP SERPL CALCULATED.3IONS-SCNC: 13 MMOL/L (ref 9–17)
AST SERPL-CCNC: 17 U/L
BACTERIA URNS QL MICRO: NORMAL
BASOPHILS # BLD: <0.03 K/UL (ref 0–0.2)
BASOPHILS NFR BLD: 0 % (ref 0–2)
BILIRUB SERPL-MCNC: 0.4 MG/DL (ref 0.3–1.2)
BILIRUB UR QL STRIP: NEGATIVE
BUN SERPL-MCNC: 8 MG/DL (ref 6–20)
CALCIUM SERPL-MCNC: 9 MG/DL (ref 8.6–10.4)
CASTS #/AREA URNS LPF: NORMAL /LPF (ref 0–8)
CHLORIDE SERPL-SCNC: 100 MMOL/L (ref 98–107)
CLARITY UR: CLEAR
CO2 SERPL-SCNC: 22 MMOL/L (ref 20–31)
COLOR UR: YELLOW
CREAT SERPL-MCNC: 1.4 MG/DL (ref 0.5–0.9)
EOSINOPHIL # BLD: <0.03 K/UL (ref 0–0.44)
EOSINOPHILS RELATIVE PERCENT: 0 % (ref 1–4)
EPI CELLS #/AREA URNS HPF: NORMAL /HPF (ref 0–5)
ERYTHROCYTE [DISTWIDTH] IN BLOOD BY AUTOMATED COUNT: 19.6 % (ref 11.8–14.4)
GFR SERPL CREATININE-BSD FRML MDRD: 48 ML/MIN/1.73M2
GLUCOSE SERPL-MCNC: 113 MG/DL (ref 70–99)
GLUCOSE UR STRIP-MCNC: NEGATIVE MG/DL
HCT VFR BLD AUTO: 30.5 % (ref 36.3–47.1)
HGB BLD-MCNC: 9.3 G/DL (ref 11.9–15.1)
HGB UR QL STRIP.AUTO: ABNORMAL
IMM GRANULOCYTES # BLD AUTO: 0.08 K/UL (ref 0–0.3)
IMM GRANULOCYTES NFR BLD: 1 %
INR PPP: 1.2
KETONES UR STRIP-MCNC: ABNORMAL MG/DL
LACTIC ACID, WHOLE BLOOD: 1.4 MMOL/L (ref 0.7–2.1)
LEUKOCYTE ESTERASE UR QL STRIP: NEGATIVE
LIPASE SERPL-CCNC: 32 U/L (ref 13–60)
LYMPHOCYTES NFR BLD: 1.13 K/UL (ref 1.1–3.7)
LYMPHOCYTES RELATIVE PERCENT: 7 % (ref 24–43)
MCH RBC QN AUTO: 22.7 PG (ref 25.2–33.5)
MCHC RBC AUTO-ENTMCNC: 30.5 G/DL (ref 28.4–34.8)
MCV RBC AUTO: 74.6 FL (ref 82.6–102.9)
MONOCYTES NFR BLD: 1.29 K/UL (ref 0.1–1.2)
MONOCYTES NFR BLD: 8 % (ref 3–12)
NEUTROPHILS NFR BLD: 84 % (ref 36–65)
NEUTS SEG NFR BLD: 14.27 K/UL (ref 1.5–8.1)
NITRITE UR QL STRIP: NEGATIVE
NRBC BLD-RTO: 0 PER 100 WBC
PARTIAL THROMBOPLASTIN TIME: 28.3 SEC (ref 23–36.5)
PH UR STRIP: 7 [PH] (ref 5–8)
PLATELET # BLD AUTO: 470 K/UL (ref 138–453)
PMV BLD AUTO: 11.5 FL (ref 8.1–13.5)
POTASSIUM SERPL-SCNC: 3.7 MMOL/L (ref 3.7–5.3)
PROT SERPL-MCNC: 7 G/DL (ref 6.4–8.3)
PROT UR STRIP-MCNC: NEGATIVE MG/DL
PROTHROMBIN TIME: 14.7 SEC (ref 11.7–14.9)
RBC # BLD AUTO: 4.09 M/UL (ref 3.95–5.11)
RBC # BLD: ABNORMAL 10*6/UL
RBC #/AREA URNS HPF: NORMAL /HPF (ref 0–4)
SODIUM SERPL-SCNC: 135 MMOL/L (ref 135–144)
SP GR UR STRIP: 1.03 (ref 1–1.03)
UROBILINOGEN UR STRIP-ACNC: NORMAL EU/DL (ref 0–1)
WBC #/AREA URNS HPF: NORMAL /HPF (ref 0–5)
WBC OTHER # BLD: 16.8 K/UL (ref 3.5–11.3)

## 2024-02-16 PROCEDURE — C1729 CATH, DRAINAGE: HCPCS

## 2024-02-16 PROCEDURE — 6360000002 HC RX W HCPCS: Performed by: STUDENT IN AN ORGANIZED HEALTH CARE EDUCATION/TRAINING PROGRAM

## 2024-02-16 PROCEDURE — 6360000004 HC RX CONTRAST MEDICATION: Performed by: EMERGENCY MEDICINE

## 2024-02-16 PROCEDURE — 6360000004 HC RX CONTRAST MEDICATION: Performed by: OBSTETRICS & GYNECOLOGY

## 2024-02-16 PROCEDURE — 6360000002 HC RX W HCPCS: Performed by: RADIOLOGY

## 2024-02-16 PROCEDURE — 6360000002 HC RX W HCPCS: Performed by: EMERGENCY MEDICINE

## 2024-02-16 PROCEDURE — 85730 THROMBOPLASTIN TIME PARTIAL: CPT

## 2024-02-16 PROCEDURE — 99152 MOD SED SAME PHYS/QHP 5/>YRS: CPT

## 2024-02-16 PROCEDURE — 0T763DZ DILATION OF RIGHT URETER WITH INTRALUMINAL DEVICE, PERCUTANEOUS APPROACH: ICD-10-PCS | Performed by: RADIOLOGY

## 2024-02-16 PROCEDURE — 50695 PLMT URETERAL STENT PRQ: CPT

## 2024-02-16 PROCEDURE — 80053 COMPREHEN METABOLIC PANEL: CPT

## 2024-02-16 PROCEDURE — 96361 HYDRATE IV INFUSION ADD-ON: CPT

## 2024-02-16 PROCEDURE — 81001 URINALYSIS AUTO W/SCOPE: CPT

## 2024-02-16 PROCEDURE — 6370000000 HC RX 637 (ALT 250 FOR IP)

## 2024-02-16 PROCEDURE — 2580000003 HC RX 258: Performed by: STUDENT IN AN ORGANIZED HEALTH CARE EDUCATION/TRAINING PROGRAM

## 2024-02-16 PROCEDURE — 85025 COMPLETE CBC W/AUTO DIFF WBC: CPT

## 2024-02-16 PROCEDURE — 51702 INSERT TEMP BLADDER CATH: CPT

## 2024-02-16 PROCEDURE — 1200000000 HC SEMI PRIVATE

## 2024-02-16 PROCEDURE — 85610 PROTHROMBIN TIME: CPT

## 2024-02-16 PROCEDURE — 96375 TX/PRO/DX INJ NEW DRUG ADDON: CPT

## 2024-02-16 PROCEDURE — 99285 EMERGENCY DEPT VISIT HI MDM: CPT

## 2024-02-16 PROCEDURE — 74177 CT ABD & PELVIS W/CONTRAST: CPT

## 2024-02-16 PROCEDURE — 83605 ASSAY OF LACTIC ACID: CPT

## 2024-02-16 PROCEDURE — 99153 MOD SED SAME PHYS/QHP EA: CPT

## 2024-02-16 PROCEDURE — 0T9330Z DRAINAGE OF RIGHT KIDNEY PELVIS WITH DRAINAGE DEVICE, PERCUTANEOUS APPROACH: ICD-10-PCS | Performed by: RADIOLOGY

## 2024-02-16 PROCEDURE — 83690 ASSAY OF LIPASE: CPT

## 2024-02-16 PROCEDURE — 2580000003 HC RX 258: Performed by: EMERGENCY MEDICINE

## 2024-02-16 PROCEDURE — 93005 ELECTROCARDIOGRAM TRACING: CPT | Performed by: OBSTETRICS & GYNECOLOGY

## 2024-02-16 PROCEDURE — 96365 THER/PROPH/DIAG IV INF INIT: CPT

## 2024-02-16 PROCEDURE — 96376 TX/PRO/DX INJ SAME DRUG ADON: CPT

## 2024-02-16 RX ORDER — SENNA AND DOCUSATE SODIUM 50; 8.6 MG/1; MG/1
2 TABLET, FILM COATED ORAL 2 TIMES DAILY
Status: DISCONTINUED | OUTPATIENT
Start: 2024-02-16 | End: 2024-02-19 | Stop reason: HOSPADM

## 2024-02-16 RX ORDER — SENNA AND DOCUSATE SODIUM 50; 8.6 MG/1; MG/1
2 TABLET, FILM COATED ORAL DAILY PRN
Status: DISCONTINUED | OUTPATIENT
Start: 2024-02-16 | End: 2024-02-16

## 2024-02-16 RX ORDER — BISACODYL 10 MG
10 SUPPOSITORY, RECTAL RECTAL DAILY PRN
Status: DISCONTINUED | OUTPATIENT
Start: 2024-02-16 | End: 2024-02-16

## 2024-02-16 RX ORDER — MORPHINE SULFATE 4 MG/ML
4 INJECTION, SOLUTION INTRAMUSCULAR; INTRAVENOUS ONCE
Status: DISCONTINUED | OUTPATIENT
Start: 2024-02-16 | End: 2024-02-16

## 2024-02-16 RX ORDER — ONDANSETRON 2 MG/ML
4 INJECTION INTRAMUSCULAR; INTRAVENOUS ONCE
Status: COMPLETED | OUTPATIENT
Start: 2024-02-16 | End: 2024-02-16

## 2024-02-16 RX ORDER — 0.9 % SODIUM CHLORIDE 0.9 %
1000 INTRAVENOUS SOLUTION INTRAVENOUS ONCE
Status: COMPLETED | OUTPATIENT
Start: 2024-02-16 | End: 2024-02-16

## 2024-02-16 RX ORDER — METOCLOPRAMIDE HYDROCHLORIDE 5 MG/ML
5 INJECTION INTRAMUSCULAR; INTRAVENOUS EVERY 8 HOURS
Status: DISCONTINUED | OUTPATIENT
Start: 2024-02-16 | End: 2024-02-18

## 2024-02-16 RX ORDER — FENTANYL CITRATE 50 UG/ML
INJECTION, SOLUTION INTRAMUSCULAR; INTRAVENOUS PRN
Status: COMPLETED | OUTPATIENT
Start: 2024-02-16 | End: 2024-02-16

## 2024-02-16 RX ORDER — MORPHINE SULFATE 4 MG/ML
4 INJECTION, SOLUTION INTRAMUSCULAR; INTRAVENOUS ONCE
Status: COMPLETED | OUTPATIENT
Start: 2024-02-16 | End: 2024-02-16

## 2024-02-16 RX ORDER — MIDAZOLAM HYDROCHLORIDE 2 MG/2ML
INJECTION, SOLUTION INTRAMUSCULAR; INTRAVENOUS PRN
Status: COMPLETED | OUTPATIENT
Start: 2024-02-16 | End: 2024-02-16

## 2024-02-16 RX ORDER — ACETAMINOPHEN 650 MG/1
650 SUPPOSITORY RECTAL EVERY 4 HOURS PRN
Status: DISCONTINUED | OUTPATIENT
Start: 2024-02-16 | End: 2024-02-17

## 2024-02-16 RX ORDER — SODIUM CHLORIDE 9 MG/ML
INJECTION, SOLUTION INTRAVENOUS CONTINUOUS
Status: DISCONTINUED | OUTPATIENT
Start: 2024-02-16 | End: 2024-02-19 | Stop reason: HOSPADM

## 2024-02-16 RX ORDER — ONDANSETRON 2 MG/ML
4 INJECTION INTRAMUSCULAR; INTRAVENOUS EVERY 6 HOURS PRN
Status: DISCONTINUED | OUTPATIENT
Start: 2024-02-16 | End: 2024-02-19 | Stop reason: HOSPADM

## 2024-02-16 RX ADMIN — IOPAMIDOL 50 ML: 755 INJECTION, SOLUTION INTRAVENOUS at 17:03

## 2024-02-16 RX ADMIN — MORPHINE SULFATE 4 MG: 4 INJECTION INTRAVENOUS at 10:40

## 2024-02-16 RX ADMIN — MORPHINE SULFATE 4 MG: 4 INJECTION INTRAVENOUS at 09:03

## 2024-02-16 RX ADMIN — SODIUM CHLORIDE: 9 INJECTION, SOLUTION INTRAVENOUS at 14:07

## 2024-02-16 RX ADMIN — MIDAZOLAM HYDROCHLORIDE 1 MG: 1 INJECTION, SOLUTION INTRAMUSCULAR; INTRAVENOUS at 16:15

## 2024-02-16 RX ADMIN — FENTANYL CITRATE 50 MCG: 50 INJECTION, SOLUTION INTRAMUSCULAR; INTRAVENOUS at 16:10

## 2024-02-16 RX ADMIN — METHOCARBAMOL 500 MG: 100 INJECTION INTRAMUSCULAR; INTRAVENOUS at 11:55

## 2024-02-16 RX ADMIN — HYDROMORPHONE HYDROCHLORIDE 1 MG: 1 INJECTION, SOLUTION INTRAMUSCULAR; INTRAVENOUS; SUBCUTANEOUS at 22:54

## 2024-02-16 RX ADMIN — SODIUM CHLORIDE: 9 INJECTION, SOLUTION INTRAVENOUS at 23:52

## 2024-02-16 RX ADMIN — ONDANSETRON 4 MG: 2 INJECTION INTRAMUSCULAR; INTRAVENOUS at 09:03

## 2024-02-16 RX ADMIN — HYDROMORPHONE HYDROCHLORIDE 0.5 MG: 1 INJECTION, SOLUTION INTRAMUSCULAR; INTRAVENOUS; SUBCUTANEOUS at 16:20

## 2024-02-16 RX ADMIN — HYDROMORPHONE HYDROCHLORIDE 0.5 MG: 1 INJECTION, SOLUTION INTRAMUSCULAR; INTRAVENOUS; SUBCUTANEOUS at 11:55

## 2024-02-16 RX ADMIN — IOPAMIDOL 75 ML: 755 INJECTION, SOLUTION INTRAVENOUS at 09:05

## 2024-02-16 RX ADMIN — DOCUSATE SODIUM 50 MG AND SENNOSIDES 8.6 MG 2 TABLET: 8.6; 5 TABLET, FILM COATED ORAL at 21:56

## 2024-02-16 RX ADMIN — HYDROMORPHONE HYDROCHLORIDE 0.5 MG: 1 INJECTION, SOLUTION INTRAMUSCULAR; INTRAVENOUS; SUBCUTANEOUS at 12:44

## 2024-02-16 RX ADMIN — SODIUM CHLORIDE 1000 ML: 9 INJECTION, SOLUTION INTRAVENOUS at 09:02

## 2024-02-16 RX ADMIN — HYDROMORPHONE HYDROCHLORIDE 1 MG: 1 INJECTION, SOLUTION INTRAMUSCULAR; INTRAVENOUS; SUBCUTANEOUS at 19:53

## 2024-02-16 RX ADMIN — Medication 1000 MG: at 16:15

## 2024-02-16 RX ADMIN — HYDROMORPHONE HYDROCHLORIDE 0.5 MG: 1 INJECTION, SOLUTION INTRAMUSCULAR; INTRAVENOUS; SUBCUTANEOUS at 16:38

## 2024-02-16 RX ADMIN — ONDANSETRON 4 MG: 2 INJECTION INTRAMUSCULAR; INTRAVENOUS at 11:56

## 2024-02-16 ASSESSMENT — PAIN SCALES - GENERAL
PAINLEVEL_OUTOF10: 10
PAINLEVEL_OUTOF10: 7
PAINLEVEL_OUTOF10: 10
PAINLEVEL_OUTOF10: 10
PAINLEVEL_OUTOF10: 7
PAINLEVEL_OUTOF10: 9

## 2024-02-16 ASSESSMENT — PAIN DESCRIPTION - DESCRIPTORS
DESCRIPTORS: CRAMPING
DESCRIPTORS: SQUEEZING
DESCRIPTORS: SQUEEZING
DESCRIPTORS: ACHING
DESCRIPTORS: CRAMPING

## 2024-02-16 ASSESSMENT — PAIN DESCRIPTION - LOCATION
LOCATION: ABDOMEN
LOCATION: BACK

## 2024-02-16 ASSESSMENT — PAIN DESCRIPTION - ORIENTATION: ORIENTATION: RIGHT

## 2024-02-16 ASSESSMENT — LIFESTYLE VARIABLES
HOW OFTEN DO YOU HAVE A DRINK CONTAINING ALCOHOL: 2-4 TIMES A MONTH
HOW MANY STANDARD DRINKS CONTAINING ALCOHOL DO YOU HAVE ON A TYPICAL DAY: 3 OR 4

## 2024-02-16 ASSESSMENT — PAIN - FUNCTIONAL ASSESSMENT: PAIN_FUNCTIONAL_ASSESSMENT: 0-10

## 2024-02-16 NOTE — CONSULTS
Winter Hodge, Aly, Jaguar, Anup, Kera, Jin, & Fran   Urology Consult      Patient:  Sylvia Weller  MRN: [id number]  YOB: 1980    CHIEF COMPLAINT: Right-sided flank pain    HISTORY OF PRESENT ILLNESS:   The patient is a 43 y.o. female who presents with right-sided flank pain.  Patient is postop day 2 status post robotic assisted laparoscopic hysterectomy with salpingectomy.  She states that over the last day or so she has had increased abdominal pain associated with nausea and emesis.  She has had decreased appetite generalized weakness along with significant right-sided flank pain.  She denies any gross hematuria, urinary urgency, frequency, sensation of incomplete emptying.  No fall discharge noted.  Patient has not had any bowel movement since surgery.  Upon presentation, patient was noted to have slightly elevated creatinine 1.4 from a baseline normal, CT abdomen pelvis with IV contrast showed moderate to severe hydroureteronephrosis with a possible distal right ureteral injury.    Patient's old records, notes and chart reviewed and summarized above.    Past Medical History:    Past Medical History:   Diagnosis Date    Anxiety     Arthritis     Knee-left    Depression     Dysmenorrhea     History of bronchitis     Migraine     Muscle spasm     Ovarian cyst     Personal history of other medical treatment     Patient wears oral braces    Under care of team     PCP: Beverley Lala PA-C, last visit 8/2023    Under care of team     Podiatry: Dallas Mabry DPM, [place], last visit 1/2024    Under care of team     OBGYN: Ricardo Buck, last visit 12/2023    Uterine fibroid        Past Surgical History:    Past Surgical History:   Procedure Laterality Date    HYSTERECTOMY (CERVIX STATUS UNKNOWN) N/A 2/14/2024    XI ROBOTIC LAPAROSCOPIC HYSTERECTOMY, BILATERAL SALPINGECTOMY, LYSIS OF ADHESION performed by Manuel Colon MD at [facility] OR    LAPAROSCOPIC 
placed per urology  - Dilaudid and robaxin ordered for pain control  - Pepcid and zofran ordered   - Continue to monitor closely    Patient Active Problem List    Diagnosis Date Noted    Dysmenorrhea 11/02/2022     Priority: Medium    Menorrhagia with regular cycle 11/02/2022     Priority: Medium    S/p MELISSA, BS 2/14/24 02/14/2024    Tobacco use 11/02/2022     Desires Nicotine Patch         Plan discussed with Dr. Colon, who is agreeable.     Attending's Name: Dr. Darlene Kurtz DO  Ob/Gyn Resident  [facility]  2/16/2024, 11:09 AM

## 2024-02-16 NOTE — BRIEF OP NOTE
Brief Postoperative Note    Sylvia Weller  YOB: 1980  [id number]    Pre-operative Diagnosis: Hydronephrosis    Post-operative Diagnosis: Same    Procedure: Nephrostomy tube placement and ureteral stent placement     Anesthesia: Local and Moderate Sedation    Surgeons/Assistants: MD Jhon    Estimated Blood Loss: less than 50     Complications: None    Specimens: Was Not Obtained    Findings: Successful placement of     Electronically signed by DARIUSZ Morgan on 2/16/2024 at 6:37 PM

## 2024-02-16 NOTE — DISCHARGE SUMMARY
Gyn Discharge Summary  [facility]      Patient Name: Sylvia Weller  Patient : 1980  Primary Care Physician: Sachi Martel PA  Admit Date: 2024    Principal Diagnosis: Right Ureteral Injury, Post Op Ileus    Other Diagnosis:   Hydronephrosis [N13.30]  Hydroureter [N13.4]  FREDDY (acute kidney injury) ([facility]) [N17.9]  Hydronephrosis, unspecified hydronephrosis type [N13.30]  Patient Active Problem List   Diagnosis    Tobacco use    Dysmenorrhea    Menorrhagia with regular cycle    S/p RALH, BS 24    Hydronephrosis     Infection: No  Hospital Acquired: n/a    Surgical Operations & Procedures: IR nephrostomy and stent placement    Consultations: Urology, IR    Pertinent Findings & Procedures:   Sylvia Weller is a 43 y.o. female , admitted for Right Ureteral Injury, Post Op Ileus.     Course of patient:  HD#1: CT showed signs concerning for right ureteral injury. IR and Urology consulted. She received a fernandez catheter, pain control, and medical management of suspected ileus. She underwent IR guided R nephrostomy tube placement, R ureteral stent placement, and anterograde pyelogram  on the evening of admission.    HD#2: UOP appropriate. KUB showed continuous concerns for ileus. Urology plans for clamping of nephrostomy tube and repeat percutaneous antegrade nephrostogram 2. Creatinine improved to 0.9. Patient passed flatus and diet advanced.  HD#3: Creatinine improved to 0/7, pain had multiple spontaneous voids after fernandez catheter removal. Pain was controlled. Hgb noted to be 6.9 and patient received 1U pRBC. Post transfusion Hgb was 8.3.   HD#4: Repeat nephrostogram showing patent right ureteral stent. Nephrostomy tube removed. Stent to stay in place 4-6 weeks, patient to follow up with Urology outpatient. Patient stable for discharge home.    Discharge to: Home    Readmission planned: No    Recommendations on Discharge:     Medications:     Medication List        ASK your

## 2024-02-16 NOTE — POST SEDATION
Sedation Post Procedure Note    Patient Name: Sylvia Weller   YOB: 1980  Room/Bed: 17/17  Medical Record Number: [id number]  Date: 2/16/2024   Time: 6:36 PM         Physicians/Assistants: DARIUSZ Morgan    Procedure Performed:  Nephrostomy tube placement with stent placement     Post-Sedation Vital Signs:  Vitals:    02/16/24 1745   BP: 100/82   Pulse: 74   Resp: 21   Temp:    SpO2: 100%      Vital signs were reviewed and were stable after the procedure (see flow sheet for vitals)            Post-Sedation Exam: Pt remains stable           Complications: none    Electronically signed by DARIUSZ Morgan on 2/16/2024 at 6:36 PM

## 2024-02-16 NOTE — H&P
OB/GYN H&P  [facility]    Patient Name: Sylvia Weller     Patient : 1980  Room/Bed:   Admission Date/Time: 2024  8:41 AM  Primary Care Physician: Sachi Martel PA      CC:   Chief Complaint   Patient presents with    Abdominal Pain                HPI: Sylvia Weller is a 43 y.o. female  presents from home with abdominal pain that woke her up last night around 12-2 in the morning. She states that the pain was so bad that she had to force herself to throw up because she could feel her medications sitting in her stomach. She states that the pain started in her upper abdomen and then migrated to her RLQ and now radiates to her back as well. She states that she has not had a bowel movement since Monday prior to her hysterectomy, but because of the abdominal pain she has not had an appetite and has not been eating or drinking like she normally does. She endorses nausea, vomiting, chills. Denies fevers, passing gas or bowel movement. She states that she has been voiding normally at home and denies any blood in the urine. She denies any vaginal bleeding or gushes of fluid vaginally.    Patient's last menstrual period was 2024 (exact date).     REVIEW OF SYSTEMS:   Constitutional: negative fever, positive chills, negative weight changes   HEENT: negative visual disturbances, negative headaches, negative dizziness, negative hearing loss  Breast: Negative breast abnormalities, negative breast lumps, negative nipple discharge  Respiratory: negative dyspnea, negative cough, negative SOB  Cardiovascular: negative chest pain,  negative palpitations, negative arrhythmia, negative syncope   Gastrointestinal: positive abdominal pain, negative RUQ pain, negative N/V, negative diarrhea, negative constipation, negative bowel changes, negative heartburn   Genitourinary: negative dysuria, negative hematuria, negative urinary incontinence, negative vaginal discharge, negative vaginal

## 2024-02-16 NOTE — ED PROVIDER NOTES
[facility]     Emergency Department     Faculty Attestation    I performed a history and physical examination of the patient and discussed management with the resident. I reviewed the resident’s note and agree with the documented findings including all diagnostic interpretations and plan of care. Any areas of disagreement are noted on the chart. I was personally present for the key portions of any procedures. I have documented in the chart those procedures where I was not present during the key portions. I have reviewed the emergency nurses triage note. I agree with the chief complaint, past medical history, past surgical history, allergies, medications, social and family history as documented unless otherwise noted below. Documentation of the HPI, Physical Exam and Medical Decision Making performed by tim is based on my personal performance of the HPI, PE and MDM. For Physician Assistant/ Nurse Practitioner cases/documentation I have personally evaluated this patient and have completed at least one if not all key elements of the E/M (history, physical exam, and MDM). Additional findings are as noted.    Primary Care Physician: Sachi Martel PA    Note Started: 9:22 AM EST     VITAL SIGNS:   oral temperature is 98.3 °F (36.8 °C). Her blood pressure is 160/88 (abnormal) and her pulse is 83. Her respiration is 18 and oxygen saturation is 98%.      Medical Decision Making  Abdominal pain.  Status post hysterectomy on 2/14.  Has not had bowel movements since then is not passing flatus.  No fevers.  On examination appears significantly uncomfortable, abdomen soft, no distention, diffusely tender right worse than left.  Surgical incision sites clean dry and intact with no surrounding erythema.  Impression postop abdominal pain.  CT abdomen, labs, GYN consultation.    Amount and/or Complexity of Data Reviewed  External Data Reviewed: notes.  Labs: ordered. 
   Not on file   Tobacco Use    Smoking status: Every Day     Current packs/day: 0.10     Average packs/day: 0.1 packs/day for 30.0 years (3.0 ttl pk-yrs)     Types: Cigarettes     Passive exposure: Current    Smokeless tobacco: Never   Vaping Use    Vaping Use: Every day    Substances: Nicotine, THC   Substance and Sexual Activity    Alcohol use: Yes     Comment: occasioanlly    Drug use: Yes     Frequency: 3.0 times per week     Types: Marijuana (Weed)    Sexual activity: Not Currently   Other Topics Concern    Not on file   Social History Narrative    Not on file     Social Determinants of Health     Financial Resource Strain: Low Risk  (8/1/2022)    Overall Financial Resource Strain (CARDIA)     Difficulty of Paying Living Expenses: Not hard at all   Food Insecurity: Food Insecurity Present (8/1/2022)    Hunger Vital Sign     Worried About Running Out of Food in the Last Year: Sometimes true     Ran Out of Food in the Last Year: Sometimes true   Transportation Needs: Not on file   Physical Activity: Not on file   Stress: Not on file   Social Connections: Not on file   Intimate Partner Violence: Not on file   Housing Stability: Not on file       Family History   Problem Relation Age of Onset    Other Mother         possible ovarian Ca 8/03/23 pending pending bx    Hypertension Mother     COPD Mother         smoker    Arthritis Mother     Cirrhosis Father     Alcohol Abuse Father     Other Sister         blood disorder, low RBC ct    Asthma Brother     Inflam Bowel Dis Brother     No Known Problems Brother     No Known Problems Brother     Heart Disease Maternal Grandmother     Heart Failure Maternal Grandmother     Cancer Maternal Grandfather     No Known Problems Paternal Grandmother     No Known Problems Paternal Grandfather        Allergies:  Patient has no known allergies.    Home Medications:  Prior to Admission medications    Medication Sig Start Date End Date Taking? Authorizing Provider   gabapentin

## 2024-02-16 NOTE — PRE SEDATION
20 MG capsule Take 1 capsule by mouth in the morning. 8/1/22   Caitlin Goddard,      Coumadin Use Last 7 Days:  no  Antiplatelet drug therapy use last 7 days: no  Other anticoagulant use last 7 days: no  Additional Medication Information:  see med [facility]      Pre-Sedation Documentation and Exam:   I have reviewed the patient's history and review of systems.    Mallampati Airway Assessment:  Mallampati Class II - (soft palate, fauces & uvula are visible)    Prior History of Anesthesia Complications:   none    ASA Classification:  Class 2 - A normal healthy patient with mild systemic disease    Sedation/ Anesthesia Plan:   intravenous sedation    Medications Planned:   midazolam (Versed) intravenously, fentanyl intravenously, and dilaudid IV    Patient is an appropriate candidate for plan of sedation: yes    Electronically signed by DARIUSZ Morgan on 2/16/2024 at 6:34 PM

## 2024-02-17 ENCOUNTER — APPOINTMENT (OUTPATIENT)
Dept: GENERAL RADIOLOGY | Age: 44
DRG: 394 | End: 2024-02-17
Payer: COMMERCIAL

## 2024-02-17 LAB
ALBUMIN SERPL-MCNC: 3.1 G/DL (ref 3.5–5.2)
ALBUMIN/GLOB SERPL: 1.3 {RATIO} (ref 1–2.5)
ALP SERPL-CCNC: 32 U/L (ref 35–104)
ALT SERPL-CCNC: 9 U/L (ref 5–33)
ANION GAP SERPL CALCULATED.3IONS-SCNC: 9 MMOL/L (ref 9–17)
AST SERPL-CCNC: 13 U/L
BASOPHILS # BLD: <0.03 K/UL (ref 0–0.2)
BASOPHILS NFR BLD: 0 % (ref 0–2)
BILIRUB SERPL-MCNC: 0.3 MG/DL (ref 0.3–1.2)
BUN SERPL-MCNC: 7 MG/DL (ref 6–20)
CALCIUM SERPL-MCNC: 8 MG/DL (ref 8.6–10.4)
CHLORIDE SERPL-SCNC: 103 MMOL/L (ref 98–107)
CO2 SERPL-SCNC: 25 MMOL/L (ref 20–31)
CREAT SERPL-MCNC: 0.9 MG/DL (ref 0.5–0.9)
EOSINOPHIL # BLD: 0.1 K/UL (ref 0–0.44)
EOSINOPHILS RELATIVE PERCENT: 1 % (ref 1–4)
ERYTHROCYTE [DISTWIDTH] IN BLOOD BY AUTOMATED COUNT: 19.7 % (ref 11.8–14.4)
GFR SERPL CREATININE-BSD FRML MDRD: >60 ML/MIN/1.73M2
GLUCOSE SERPL-MCNC: 98 MG/DL (ref 70–99)
HCT VFR BLD AUTO: 26.3 % (ref 36.3–47.1)
HGB BLD-MCNC: 7.6 G/DL (ref 11.9–15.1)
IMM GRANULOCYTES # BLD AUTO: 0.07 K/UL (ref 0–0.3)
IMM GRANULOCYTES NFR BLD: 1 %
LYMPHOCYTES NFR BLD: 1.91 K/UL (ref 1.1–3.7)
LYMPHOCYTES RELATIVE PERCENT: 14 % (ref 24–43)
MCH RBC QN AUTO: 22.6 PG (ref 25.2–33.5)
MCHC RBC AUTO-ENTMCNC: 28.9 G/DL (ref 28.4–34.8)
MCV RBC AUTO: 78.3 FL (ref 82.6–102.9)
MONOCYTES NFR BLD: 1.49 K/UL (ref 0.1–1.2)
MONOCYTES NFR BLD: 11 % (ref 3–12)
NEUTROPHILS NFR BLD: 74 % (ref 36–65)
NEUTS SEG NFR BLD: 10.28 K/UL (ref 1.5–8.1)
NRBC BLD-RTO: 0 PER 100 WBC
PLATELET # BLD AUTO: 353 K/UL (ref 138–453)
PMV BLD AUTO: 12.7 FL (ref 8.1–13.5)
POTASSIUM SERPL-SCNC: 3.5 MMOL/L (ref 3.7–5.3)
PROT SERPL-MCNC: 5.5 G/DL (ref 6.4–8.3)
RBC # BLD AUTO: 3.36 M/UL (ref 3.95–5.11)
RBC # BLD: ABNORMAL 10*6/UL
SODIUM SERPL-SCNC: 137 MMOL/L (ref 135–144)
WBC OTHER # BLD: 13.9 K/UL (ref 3.5–11.3)

## 2024-02-17 PROCEDURE — 85025 COMPLETE CBC W/AUTO DIFF WBC: CPT

## 2024-02-17 PROCEDURE — 6360000002 HC RX W HCPCS: Performed by: STUDENT IN AN ORGANIZED HEALTH CARE EDUCATION/TRAINING PROGRAM

## 2024-02-17 PROCEDURE — 6370000000 HC RX 637 (ALT 250 FOR IP): Performed by: STUDENT IN AN ORGANIZED HEALTH CARE EDUCATION/TRAINING PROGRAM

## 2024-02-17 PROCEDURE — 1200000000 HC SEMI PRIVATE

## 2024-02-17 PROCEDURE — 6370000000 HC RX 637 (ALT 250 FOR IP)

## 2024-02-17 PROCEDURE — 6360000002 HC RX W HCPCS

## 2024-02-17 PROCEDURE — 74018 RADEX ABDOMEN 1 VIEW: CPT

## 2024-02-17 PROCEDURE — 36415 COLL VENOUS BLD VENIPUNCTURE: CPT

## 2024-02-17 PROCEDURE — 80053 COMPREHEN METABOLIC PANEL: CPT

## 2024-02-17 PROCEDURE — 2580000003 HC RX 258: Performed by: STUDENT IN AN ORGANIZED HEALTH CARE EDUCATION/TRAINING PROGRAM

## 2024-02-17 RX ORDER — ACETAMINOPHEN 500 MG
1000 TABLET ORAL EVERY 6 HOURS PRN
Status: DISCONTINUED | OUTPATIENT
Start: 2024-02-17 | End: 2024-02-19 | Stop reason: HOSPADM

## 2024-02-17 RX ORDER — POTASSIUM CHLORIDE 20 MEQ/1
40 TABLET, EXTENDED RELEASE ORAL PRN
Status: DISCONTINUED | OUTPATIENT
Start: 2024-02-17 | End: 2024-02-19 | Stop reason: HOSPADM

## 2024-02-17 RX ORDER — OXYBUTYNIN CHLORIDE 10 MG/1
10 TABLET, EXTENDED RELEASE ORAL 2 TIMES DAILY PRN
Status: DISCONTINUED | OUTPATIENT
Start: 2024-02-17 | End: 2024-02-18

## 2024-02-17 RX ORDER — ENOXAPARIN SODIUM 100 MG/ML
40 INJECTION SUBCUTANEOUS DAILY
Status: COMPLETED | OUTPATIENT
Start: 2024-02-17 | End: 2024-02-18

## 2024-02-17 RX ORDER — POTASSIUM CHLORIDE 7.45 MG/ML
10 INJECTION INTRAVENOUS PRN
Status: DISCONTINUED | OUTPATIENT
Start: 2024-02-17 | End: 2024-02-19 | Stop reason: HOSPADM

## 2024-02-17 RX ADMIN — METOCLOPRAMIDE 5 MG: 5 INJECTION, SOLUTION INTRAMUSCULAR; INTRAVENOUS at 17:39

## 2024-02-17 RX ADMIN — HYDROMORPHONE HYDROCHLORIDE 1 MG: 1 INJECTION, SOLUTION INTRAMUSCULAR; INTRAVENOUS; SUBCUTANEOUS at 20:53

## 2024-02-17 RX ADMIN — METHOCARBAMOL 500 MG: 100 INJECTION INTRAMUSCULAR; INTRAVENOUS at 12:29

## 2024-02-17 RX ADMIN — OXYBUTYNIN CHLORIDE 10 MG: 10 TABLET, EXTENDED RELEASE ORAL at 15:04

## 2024-02-17 RX ADMIN — METHOCARBAMOL 500 MG: 100 INJECTION INTRAMUSCULAR; INTRAVENOUS at 19:49

## 2024-02-17 RX ADMIN — METOCLOPRAMIDE 5 MG: 5 INJECTION, SOLUTION INTRAMUSCULAR; INTRAVENOUS at 02:38

## 2024-02-17 RX ADMIN — METHOCARBAMOL 500 MG: 100 INJECTION INTRAMUSCULAR; INTRAVENOUS at 02:46

## 2024-02-17 RX ADMIN — POTASSIUM CHLORIDE 40 MEQ: 1500 TABLET, EXTENDED RELEASE ORAL at 15:04

## 2024-02-17 RX ADMIN — HYDROMORPHONE HYDROCHLORIDE 1 MG: 1 INJECTION, SOLUTION INTRAMUSCULAR; INTRAVENOUS; SUBCUTANEOUS at 08:20

## 2024-02-17 RX ADMIN — SODIUM CHLORIDE: 9 INJECTION, SOLUTION INTRAVENOUS at 23:10

## 2024-02-17 RX ADMIN — DOCUSATE SODIUM 50 MG AND SENNOSIDES 8.6 MG 2 TABLET: 8.6; 5 TABLET, FILM COATED ORAL at 19:47

## 2024-02-17 RX ADMIN — ENOXAPARIN SODIUM 40 MG: 100 INJECTION SUBCUTANEOUS at 15:20

## 2024-02-17 RX ADMIN — METOCLOPRAMIDE 5 MG: 5 INJECTION, SOLUTION INTRAMUSCULAR; INTRAVENOUS at 10:47

## 2024-02-17 RX ADMIN — DOCUSATE SODIUM 50 MG AND SENNOSIDES 8.6 MG 2 TABLET: 8.6; 5 TABLET, FILM COATED ORAL at 08:14

## 2024-02-17 RX ADMIN — HYDROMORPHONE HYDROCHLORIDE 1 MG: 1 INJECTION, SOLUTION INTRAMUSCULAR; INTRAVENOUS; SUBCUTANEOUS at 02:40

## 2024-02-17 RX ADMIN — HYOSCYAMINE SULFATE 125 MCG: 0.12 TABLET ORAL; SUBLINGUAL at 10:48

## 2024-02-17 ASSESSMENT — PAIN SCALES - GENERAL
PAINLEVEL_OUTOF10: 7
PAINLEVEL_OUTOF10: 8
PAINLEVEL_OUTOF10: 8

## 2024-02-17 ASSESSMENT — PAIN DESCRIPTION - LOCATION
LOCATION: BACK
LOCATION: BACK

## 2024-02-17 NOTE — ED NOTES
IR notified writer that they will be coming to take patient for R nephrostomy tube. Writer notified Tiffany LINCOLN.   
Lap alvaro on Wednesday, not had a bm x 4 days, since yesterday has had pain that she describes as cramping that comes in waves, not passing gas, lack of appetite , vomiting today . Denies dysuria and fever. Incisions CDI, no drainage. States pain meds not helping. Able to transfer from wheelchair to stretcher. Placed on cardiac monitor. Assessment complete  
Medicated as ordered  
Provided beverages and ice chips. Not needing pain med at this time, side rails up , feeling better after returning from IR, right nephrostomy draining cranberry colored urine  
Pt c/o pain , educated about med regimen , the dose is 3 hours apart. Verbalized understanding. Dimmed lights, repositioned and started iv  fluids. Family at bedside. Will message team if pain worsens, pt states she can tolerate it at this point  
Pt returned to ER from IR   
Pt to IR  
Pt transferred to floor by Zach LINCOLN  
Pt was comfortable previously but then OB did a vaginal exam and since pain is not controlled with med or repositioning. Offered some suggestions for comfort, currently pt right side lying.  at bedside. Medicated as ordered for a full dose of 1 mg dilaudid ivp. Side rails up x 2 and call light in reach   
Resting eyes closed, siderails up x 2 with  call light in hand.  at bedside, pt is free from pain at this time  
Still having right sided abd pain, but pain has improved. lying on left side at this rashad, side rails up x 2. Call light in reach, wheels locked.   
The following labs were labeled with appropriate pt sticker and tubed to lab:     [] Blue     [] Lavender   [] on ice  [] Green/yellow  [] Green/black [] on ice  [] Grey  [] on ice  [] Yellow  [] Red  [] Pink  [] Type/ Screen  [] ABG  [] VBG    [] COVID-19 swab    [] Rapid  [] PCR  [] Flu swab  [] Peds Viral Panel     [x] Urine Sample  [] Fecal Sample  [] Pelvic Cultures  [] Blood Cultures  [] X 2  [] STREP Cultures  [] Wound Cultures    
LAPAROSCOPIC HYSTERECTOMY, BILATERAL SALPINGECTOMY, LYSIS OF ADHESION performed by Manuel Colon MD at [facility] OR    LAPAROSCOPIC HYSTERECTOMY Bilateral 02/14/2024    XI ROBOTIC LAPAROSCOPIC HYSTERECTOMY, BILATERAL SALPINGECTOMY, LYSIS OF ADHESION       PAST MEDICAL HISTORY       Past Medical History:   Diagnosis Date    Anxiety     Arthritis     Knee-left    Depression     Dysmenorrhea     History of bronchitis     Migraine     Muscle spasm     Ovarian cyst     Personal history of other medical treatment     Patient wears oral braces    Under care of team     PCP: Sachi Martel PA-C, [place], last visit 8/2023    Under care of team     Podiatry: Dallas Mabry DPM, [place], last visit 1/2024    Under care of team     OBGYN: Ricardo Buck, last visit 12/2023    Uterine fibroid        Labs:  Labs Reviewed   CBC WITH AUTO DIFFERENTIAL - Abnormal; Notable for the following components:       Result Value    WBC 16.8 (*)     Hemoglobin 9.3 (*)     Hematocrit 30.5 (*)     MCV 74.6 (*)     MCH 22.7 (*)     RDW 19.6 (*)     Platelets 470 (*)     Neutrophils % 84 (*)     Lymphocytes % 7 (*)     Eosinophils % 0 (*)     Immature Granulocytes 1 (*)     Neutrophils Absolute 14.27 (*)     Monocytes Absolute 1.29 (*)     All other components within normal limits   COMPREHENSIVE METABOLIC PANEL - Abnormal; Notable for the following components:    Glucose 113 (*)     Creatinine 1.4 (*)     Est, Glom Filt Rate 48 (*)     All other components within normal limits   URINALYSIS WITH REFLEX TO CULTURE - Abnormal; Notable for the following components:    Ketones, Urine SMALL (*)     Specific Gravity, UA 1.033 (*)     Urine Hgb TRACE (*)     All other components within normal limits   LIPASE   LACTIC ACID   MICROSCOPIC URINALYSIS   PROTIME-INR   APTT       Electronically signed by Tiffany Brooke RN on 2/16/2024 at 1:33 PM   
injection    ondansetron (ZOFRAN) injection 4 mg    sennosides-docusate sodium (SENOKOT-S) 8.6-50 MG tablet 2 tablet    bisacodyl (DULCOLAX) suppository 10 mg    acetaminophen (TYLENOL) suppository 650 mg    OR Linked Order Group     HYDROmorphone (DILAUDID) injection 0.5 mg     HYDROmorphone (DILAUDID) injection 1 mg    0.9 % sodium chloride infusion    HYDROmorphone (DILAUDID) injection 1 mg    fentaNYL (SUBLIMAZE) injection    midazolam PF (VERSED) injection    ceFAZolin (ANCEF) 2000 mg in sterile water 20 mL IV syringe    HYDROmorphone (DILAUDID) injection    iopamidol (ISOVUE-370) 76 % injection 100 mL       SURGICAL HISTORY       Past Surgical History:   Procedure Laterality Date    HYSTERECTOMY (CERVIX STATUS UNKNOWN) N/A 2/14/2024    XI ROBOTIC LAPAROSCOPIC HYSTERECTOMY, BILATERAL SALPINGECTOMY, LYSIS OF ADHESION performed by Manuel Colon MD at [email or web address] OR    LAPAROSCOPIC HYSTERECTOMY Bilateral 02/14/2024    XI ROBOTIC LAPAROSCOPIC HYSTERECTOMY, BILATERAL SALPINGECTOMY, LYSIS OF ADHESION       PAST MEDICAL HISTORY       Past Medical History:   Diagnosis Date    Anxiety     Arthritis     Knee-left    Depression     Dysmenorrhea     History of bronchitis     Migraine     Muscle spasm     Ovarian cyst     Personal history of other medical treatment     Patient wears oral braces    Under care of team     PCP: Beverley Lala PA-C, last visit 8/2023    Under care of team     Podiatry: Dallas Mabry DPM, [place], last visit 1/2024    Under care of team     OBGYN: Ricardo Buck, last visit 12/2023    Uterine fibroid        Labs:  Labs Reviewed   CBC WITH AUTO DIFFERENTIAL - Abnormal; Notable for the following components:       Result Value    WBC 16.8 (*)     Hemoglobin 9.3 (*)     Hematocrit 30.5 (*)     MCV 74.6 (*)     MCH 22.7 (*)     RDW 19.6 (*)     Platelets 470 (*)     Neutrophils % 84 (*)     Lymphocytes % 7 (*)     Eosinophils % 0 (*)     Immature Granulocytes 1 (*)

## 2024-02-17 NOTE — CARE COORDINATION
Case Management Assessment  Initial Evaluation    Date/Time of Evaluation: 2/17/2024 11:49 AM  Assessment Completed by: TERESITA CARBONE RN    If patient is discharged prior to next notation, then this note serves as note for discharge by case management.    Patient Name: Sylvia Weller                   YOB: 1980  Diagnosis: Hydronephrosis [N13.30]  Hydroureter [N13.4]  FREDDY (acute kidney injury) (HCC) [N17.9]  Hydronephrosis, unspecified hydronephrosis type [N13.30]                   Date / Time: 2/16/2024  8:41 AM    Patient Admission Status: Inpatient   Readmission Risk (Low < 19, Mod (19-27), High > 27): Readmission Risk Score: 12.5    Current PCP: Sachi Martel PA  PCP verified by CM? Yes    Chart Reviewed: Yes      History Provided by: Patient  Patient Orientation: Alert and Oriented    Patient Cognition: Alert    Hospitalization in the last 30 days (Readmission):  No    If yes, Readmission Assessment in CM Navigator will be completed.    Advance Directives:      Code Status: Full Code   Patient's Primary Decision Maker is:        Discharge Planning:    Patient lives with: Children Type of Home: Apartment  Primary Care Giver: Self  Patient Support Systems include: Family Members   Current Financial resources:    Current community resources:    Current services prior to admission: None            Current DME:              Type of Home Care services:  None    ADLS  Prior functional level: Independent in ADLs/IADLs  Current functional level: Independent in ADLs/IADLs    PT AM-PAC:   /24  OT AM-PAC:   /24    Family can provide assistance at DC:    Would you like Case Management to discuss the discharge plan with any other family members/significant others, and if so, who?    Plans to Return to Present Housing: Yes  Other Identified Issues/Barriers to RETURNING to current housing: none  Potential Assistance needed at discharge: N/A            Potential DME:    Patient expects to discharge to:

## 2024-02-17 NOTE — PLAN OF CARE
Problem: Discharge Planning  Goal: Discharge to home or other facility with appropriate resources  2/17/2024 1439 by Niki Biggs, RN  Outcome: Progressing  2/17/2024 0618 by Alexis Alves, RN  Outcome: Progressing     Problem: Pain  Goal: Verbalizes/displays adequate comfort level or baseline comfort level  2/17/2024 1439 by Niki Biggs, RN  Outcome: Progressing  2/17/2024 0618 by Alexis Alves, RN  Outcome: Progressing

## 2024-02-17 NOTE — PROGRESS NOTES
Progress Note    Date: 2024  Time: 12:05 PM    Sylvia Weller 43 y.o. female , POD # 3    Patient seen and examined. She states that she is doing much better and has an appetite.  Patient is  tolerating oral liquid intake.  She denies any vaginal bleeding. She is  ambulating without difficulty.  She is  passing flatus. She denies Fever/Chills, Chest Pain, SOB, N/V, Calf Pain    Vitals:  Vitals:    24 1600 24 1615 24 1630 24 1645   BP: (!) 143/90 137/86 (!) 141/101 (!) 142/82   Pulse: 81 78 76 80   Resp:    Temp:       TempSrc:       SpO2: 97% 96% 98% 97%   Weight:       Height:                    Physical Exam:  General:  no apparent distress, alert, and cooperative  Neurologic: Alert and oriented and in NAD.  Abdomen: soft, appropriate tenderness, no CVA tenderness  Incision: dry and intact  Extremities:  non edematous    Lab:  Complete Blood Count:   Recent Labs     24  0855 24  0751   WBC 16.8* 13.9*   HGB 9.3* 7.6*   HCT 30.5* 26.3*   * 353        PT/INR:    Lab Results   Component Value Date/Time    PROTIME 14.7 2024 08:55 AM    INR 1.2 2024 08:55 AM     PTT:    Lab Results   Component Value Date/Time    APTT 28.3 2024 08:55 AM       Metabolic Profile:   Recent Labs     24  0855 24  0751    137   K 3.7 3.5*    103   CO2 22 25   BUN 8 7   CREATININE 1.4* 0.9   GLUCOSE 113* 98   CALCIUM 9.0 8.0*   PROT 7.0 5.5*   LABALBU 3.9 3.1*   BILITOT 0.4 0.3   ALKPHOS 37 32*   AST 17 13   ALT 13 9        Assessment/Plan:  Sylvia Weller 43 y.o. female , POD # 3 s/p RAVH/BS   - Doing well, vitals stable   -Nephrostomy/catheter maintenance per urology       - Encourage ambulation and use of incentive spirometer   - Continue post-op care, please page with any questions      Manuel Colon MD

## 2024-02-18 LAB
ALBUMIN SERPL-MCNC: 2.8 G/DL (ref 3.5–5.2)
ALBUMIN/GLOB SERPL: 1.3 {RATIO} (ref 1–2.5)
ALP SERPL-CCNC: 29 U/L (ref 35–104)
ALT SERPL-CCNC: 13 U/L (ref 5–33)
ANION GAP SERPL CALCULATED.3IONS-SCNC: 9 MMOL/L (ref 9–17)
AST SERPL-CCNC: 15 U/L
BASOPHILS # BLD: <0.03 K/UL (ref 0–0.2)
BASOPHILS NFR BLD: 0 % (ref 0–2)
BILIRUB SERPL-MCNC: <0.1 MG/DL (ref 0.3–1.2)
BUN SERPL-MCNC: 6 MG/DL (ref 6–20)
CALCIUM SERPL-MCNC: 7.4 MG/DL (ref 8.6–10.4)
CHLORIDE SERPL-SCNC: 107 MMOL/L (ref 98–107)
CO2 SERPL-SCNC: 23 MMOL/L (ref 20–31)
CREAT SERPL-MCNC: 0.7 MG/DL (ref 0.5–0.9)
EOSINOPHIL # BLD: 0.13 K/UL (ref 0–0.44)
EOSINOPHILS RELATIVE PERCENT: 2 % (ref 1–4)
ERYTHROCYTE [DISTWIDTH] IN BLOOD BY AUTOMATED COUNT: 20 % (ref 11.8–14.4)
GFR SERPL CREATININE-BSD FRML MDRD: >60 ML/MIN/1.73M2
GLUCOSE SERPL-MCNC: 90 MG/DL (ref 70–99)
HCT VFR BLD AUTO: 23.8 % (ref 36.3–47.1)
HCT VFR BLD AUTO: 27.1 % (ref 36.3–47.1)
HGB BLD-MCNC: 6.9 G/DL (ref 11.9–15.1)
HGB BLD-MCNC: 8.3 G/DL (ref 11.9–15.1)
IMM GRANULOCYTES # BLD AUTO: 0.04 K/UL (ref 0–0.3)
IMM GRANULOCYTES NFR BLD: 0 %
INR PPP: 1.1
LYMPHOCYTES NFR BLD: 2.02 K/UL (ref 1.1–3.7)
LYMPHOCYTES RELATIVE PERCENT: 23 % (ref 24–43)
MCH RBC QN AUTO: 22.8 PG (ref 25.2–33.5)
MCHC RBC AUTO-ENTMCNC: 29 G/DL (ref 28.4–34.8)
MCV RBC AUTO: 78.8 FL (ref 82.6–102.9)
MONOCYTES NFR BLD: 1.08 K/UL (ref 0.1–1.2)
MONOCYTES NFR BLD: 12 % (ref 3–12)
NEUTROPHILS NFR BLD: 63 % (ref 36–65)
NEUTS SEG NFR BLD: 5.62 K/UL (ref 1.5–8.1)
NRBC BLD-RTO: 0 PER 100 WBC
PARTIAL THROMBOPLASTIN TIME: 36.1 SEC (ref 23–36.5)
PLATELET # BLD AUTO: ABNORMAL K/UL (ref 138–453)
PLATELET, FLUORESCENCE: 266 K/UL (ref 138–453)
PLATELETS.RETICULATED NFR BLD AUTO: 2.9 % (ref 1.1–10.3)
POTASSIUM SERPL-SCNC: 3.3 MMOL/L (ref 3.7–5.3)
PROT SERPL-MCNC: 4.9 G/DL (ref 6.4–8.3)
PROTHROMBIN TIME: 14.4 SEC (ref 11.7–14.9)
RBC # BLD AUTO: 3.02 M/UL (ref 3.95–5.11)
RBC # BLD: ABNORMAL 10*6/UL
SODIUM SERPL-SCNC: 139 MMOL/L (ref 135–144)
WBC OTHER # BLD: 8.9 K/UL (ref 3.5–11.3)

## 2024-02-18 PROCEDURE — 51798 US URINE CAPACITY MEASURE: CPT

## 2024-02-18 PROCEDURE — 85014 HEMATOCRIT: CPT

## 2024-02-18 PROCEDURE — 86850 RBC ANTIBODY SCREEN: CPT

## 2024-02-18 PROCEDURE — 85018 HEMOGLOBIN: CPT

## 2024-02-18 PROCEDURE — 86900 BLOOD TYPING SEROLOGIC ABO: CPT

## 2024-02-18 PROCEDURE — 36415 COLL VENOUS BLD VENIPUNCTURE: CPT

## 2024-02-18 PROCEDURE — 6360000002 HC RX W HCPCS

## 2024-02-18 PROCEDURE — 6360000002 HC RX W HCPCS: Performed by: STUDENT IN AN ORGANIZED HEALTH CARE EDUCATION/TRAINING PROGRAM

## 2024-02-18 PROCEDURE — 80053 COMPREHEN METABOLIC PANEL: CPT

## 2024-02-18 PROCEDURE — 85025 COMPLETE CBC W/AUTO DIFF WBC: CPT

## 2024-02-18 PROCEDURE — 1200000000 HC SEMI PRIVATE

## 2024-02-18 PROCEDURE — P9016 RBC LEUKOCYTES REDUCED: HCPCS

## 2024-02-18 PROCEDURE — 36430 TRANSFUSION BLD/BLD COMPNT: CPT

## 2024-02-18 PROCEDURE — 86901 BLOOD TYPING SEROLOGIC RH(D): CPT

## 2024-02-18 PROCEDURE — 85610 PROTHROMBIN TIME: CPT

## 2024-02-18 PROCEDURE — 86920 COMPATIBILITY TEST SPIN: CPT

## 2024-02-18 PROCEDURE — 85055 RETICULATED PLATELET ASSAY: CPT

## 2024-02-18 PROCEDURE — 6370000000 HC RX 637 (ALT 250 FOR IP): Performed by: STUDENT IN AN ORGANIZED HEALTH CARE EDUCATION/TRAINING PROGRAM

## 2024-02-18 PROCEDURE — 85730 THROMBOPLASTIN TIME PARTIAL: CPT

## 2024-02-18 PROCEDURE — 2580000003 HC RX 258: Performed by: STUDENT IN AN ORGANIZED HEALTH CARE EDUCATION/TRAINING PROGRAM

## 2024-02-18 RX ORDER — METOCLOPRAMIDE HYDROCHLORIDE 5 MG/ML
5 INJECTION INTRAMUSCULAR; INTRAVENOUS EVERY 6 HOURS PRN
Status: DISCONTINUED | OUTPATIENT
Start: 2024-02-18 | End: 2024-02-19 | Stop reason: HOSPADM

## 2024-02-18 RX ORDER — CYCLOBENZAPRINE HCL 10 MG
10 TABLET ORAL 3 TIMES DAILY PRN
Status: DISCONTINUED | OUTPATIENT
Start: 2024-02-18 | End: 2024-02-19 | Stop reason: HOSPADM

## 2024-02-18 RX ORDER — GABAPENTIN 300 MG/1
300 CAPSULE ORAL 3 TIMES DAILY PRN
Status: DISCONTINUED | OUTPATIENT
Start: 2024-02-18 | End: 2024-02-19 | Stop reason: HOSPADM

## 2024-02-18 RX ORDER — SODIUM CHLORIDE 9 MG/ML
INJECTION, SOLUTION INTRAVENOUS PRN
Status: DISCONTINUED | OUTPATIENT
Start: 2024-02-18 | End: 2024-02-19 | Stop reason: HOSPADM

## 2024-02-18 RX ORDER — ACETAMINOPHEN 500 MG
1000 TABLET ORAL EVERY 6 HOURS PRN
Status: DISCONTINUED | OUTPATIENT
Start: 2024-02-18 | End: 2024-02-18 | Stop reason: SDUPTHER

## 2024-02-18 RX ADMIN — CYCLOBENZAPRINE 10 MG: 10 TABLET, FILM COATED ORAL at 15:10

## 2024-02-18 RX ADMIN — METOCLOPRAMIDE 5 MG: 5 INJECTION, SOLUTION INTRAMUSCULAR; INTRAVENOUS at 02:39

## 2024-02-18 RX ADMIN — METHOCARBAMOL 500 MG: 100 INJECTION INTRAMUSCULAR; INTRAVENOUS at 02:39

## 2024-02-18 RX ADMIN — HYDROMORPHONE HYDROCHLORIDE 1 MG: 1 INJECTION, SOLUTION INTRAMUSCULAR; INTRAVENOUS; SUBCUTANEOUS at 08:05

## 2024-02-18 RX ADMIN — ENOXAPARIN SODIUM 40 MG: 100 INJECTION SUBCUTANEOUS at 08:07

## 2024-02-18 RX ADMIN — METHOCARBAMOL 500 MG: 100 INJECTION INTRAMUSCULAR; INTRAVENOUS at 22:15

## 2024-02-18 RX ADMIN — POTASSIUM CHLORIDE 40 MEQ: 1500 TABLET, EXTENDED RELEASE ORAL at 08:07

## 2024-02-18 RX ADMIN — METHOCARBAMOL 500 MG: 100 INJECTION INTRAMUSCULAR; INTRAVENOUS at 15:11

## 2024-02-18 ASSESSMENT — PAIN SCALES - GENERAL
PAINLEVEL_OUTOF10: 7

## 2024-02-18 NOTE — PROGRESS NOTES
Department of Obstetrics and Gynecology        SUBJECTIVE: Hospital day #3    OBJECTIVE:  Sylvia was seen earlier this morning.  She states she feels well.  She denies having nausea or vomiting and pain controlled adequately.  Passing flatus and having bowel movement.  Denies vaginal bleeding.  Irwin removed earlier and urinating without problems.  Ambulating without signs of orthostasis.    Vitals:  BP (!) 142/82   Pulse 80   Temp 97 °F (36.1 °C) (Temporal)   Resp 18   Ht 1.549 m (5' 1\")   Wt 78 kg (172 lb)   LMP 02/02/2024 (Exact Date) Comment: Urine hCG done today 2/14/2024 @0711 was negative  SpO2 97%   BMI 32.50 kg/m²     INTAKE/OUTPUT:    No intake or output data in the 24 hours ending 02/18/24 1354    Lab:  Complete Blood Count:   Recent Labs     02/16/24  0855 02/17/24  0751 02/18/24  0620   WBC 16.8* 13.9* 8.9   HGB 9.3* 7.6* 6.9*   HCT 30.5* 26.3* 23.8*   * 353 See Reflexed IPF Result        PT/INR:    Lab Results   Component Value Date/Time    PROTIME 14.4 02/18/2024 06:20 AM    INR 1.1 02/18/2024 06:20 AM     PTT:    Lab Results   Component Value Date/Time    APTT 36.1 02/18/2024 06:20 AM       Metabolic Profile:   Recent Labs     02/16/24  0855 02/17/24  0751 02/18/24  0620    137 139   K 3.7 3.5* 3.3*    103 107   CO2 22 25 23   BUN 8 7 6   CREATININE 1.4* 0.9 0.7   GLUCOSE 113* 98 90   CALCIUM 9.0 8.0* 7.4*   PROT 7.0 5.5* 4.9*   LABALBU 3.9 3.1* 2.8*   BILITOT 0.4 0.3 <0.1*   ALKPHOS 37 32* 29*   AST 17 13 15   ALT 13 9 13          ASSESSMENT & PLAN:  Continue regular det as teresa.,  To receive 1 unit PC's today, discontinued Irwin, increase activity.  Plan for antegrade nephrostogram tomorrow and hopefully home soon.    Electronically signed by Manuel Colon MD on 2/18/2024 at 1:54 PM

## 2024-02-18 NOTE — PLAN OF CARE
Problem: Discharge Planning  Goal: Discharge to home or other facility with appropriate resources  2/18/2024 1439 by Niki Biggs, RN  Outcome: Progressing  2/18/2024 0427 by Alexis Alves, RN  Outcome: Progressing     Problem: Pain  Goal: Verbalizes/displays adequate comfort level or baseline comfort level  2/18/2024 1439 by Niki Biggs, RN  Outcome: Progressing  2/18/2024 0427 by Alexis Alves, RN  Outcome: Progressing

## 2024-02-18 NOTE — PLAN OF CARE
Problem: Discharge Planning  Goal: Discharge to home or other facility with appropriate resources  2/18/2024 0427 by Alexis Alves, RN  Outcome: Progressing  2/17/2024 1439 by Niki Biggs, RN  Outcome: Progressing     Problem: Pain  Goal: Verbalizes/displays adequate comfort level or baseline comfort level  2/18/2024 0427 by Alexis Alves, RN  Outcome: Progressing  2/17/2024 1439 by Niki Biggs, RN  Outcome: Progressing

## 2024-02-19 ENCOUNTER — APPOINTMENT (OUTPATIENT)
Dept: INTERVENTIONAL RADIOLOGY/VASCULAR | Age: 44
DRG: 394 | End: 2024-02-19
Payer: COMMERCIAL

## 2024-02-19 VITALS
SYSTOLIC BLOOD PRESSURE: 159 MMHG | HEART RATE: 64 BPM | DIASTOLIC BLOOD PRESSURE: 91 MMHG | RESPIRATION RATE: 18 BRPM | OXYGEN SATURATION: 100 % | TEMPERATURE: 98.4 F

## 2024-02-19 LAB
ABO/RH: NORMAL
ALBUMIN SERPL-MCNC: 3.2 G/DL (ref 3.5–5.2)
ALBUMIN/GLOB SERPL: 1.2 {RATIO} (ref 1–2.5)
ALP SERPL-CCNC: 33 U/L (ref 35–104)
ALT SERPL-CCNC: 20 U/L (ref 5–33)
ANION GAP SERPL CALCULATED.3IONS-SCNC: 7 MMOL/L (ref 9–17)
ANTIBODY SCREEN: NEGATIVE
ARM BAND NUMBER: NORMAL
AST SERPL-CCNC: 18 U/L
BASOPHILS # BLD: <0.03 K/UL (ref 0–0.2)
BASOPHILS NFR BLD: 0 % (ref 0–2)
BILIRUB SERPL-MCNC: 0.4 MG/DL (ref 0.3–1.2)
BLOOD BANK BLOOD PRODUCT EXPIRATION DATE: NORMAL
BLOOD BANK DISPENSE STATUS: NORMAL
BLOOD BANK ISBT PRODUCT BLOOD TYPE: 6200
BLOOD BANK PRODUCT CODE: NORMAL
BLOOD BANK SAMPLE EXPIRATION: NORMAL
BLOOD BANK UNIT TYPE AND RH: NORMAL
BPU ID: NORMAL
BUN SERPL-MCNC: 6 MG/DL (ref 6–20)
CALCIUM SERPL-MCNC: 8.7 MG/DL (ref 8.6–10.4)
CHLORIDE SERPL-SCNC: 108 MMOL/L (ref 98–107)
CO2 SERPL-SCNC: 25 MMOL/L (ref 20–31)
COMPONENT: NORMAL
CREAT SERPL-MCNC: 0.8 MG/DL (ref 0.5–0.9)
CROSSMATCH RESULT: NORMAL
EOSINOPHIL # BLD: 0.1 K/UL (ref 0–0.44)
EOSINOPHILS RELATIVE PERCENT: 1 % (ref 1–4)
ERYTHROCYTE [DISTWIDTH] IN BLOOD BY AUTOMATED COUNT: 19.6 % (ref 11.8–14.4)
GFR SERPL CREATININE-BSD FRML MDRD: >60 ML/MIN/1.73M2
GLUCOSE SERPL-MCNC: 92 MG/DL (ref 70–99)
HCT VFR BLD AUTO: 32 % (ref 36.3–47.1)
HGB BLD-MCNC: 9.5 G/DL (ref 11.9–15.1)
IMM GRANULOCYTES # BLD AUTO: 0.03 K/UL (ref 0–0.3)
IMM GRANULOCYTES NFR BLD: 0 %
LYMPHOCYTES NFR BLD: 1.73 K/UL (ref 1.1–3.7)
LYMPHOCYTES RELATIVE PERCENT: 23 % (ref 24–43)
MCH RBC QN AUTO: 23.3 PG (ref 25.2–33.5)
MCHC RBC AUTO-ENTMCNC: 29.7 G/DL (ref 28.4–34.8)
MCV RBC AUTO: 78.6 FL (ref 82.6–102.9)
MONOCYTES NFR BLD: 0.68 K/UL (ref 0.1–1.2)
MONOCYTES NFR BLD: 9 % (ref 3–12)
NEUTROPHILS NFR BLD: 67 % (ref 36–65)
NEUTS SEG NFR BLD: 4.92 K/UL (ref 1.5–8.1)
NRBC BLD-RTO: 0 PER 100 WBC
PLATELET # BLD AUTO: 385 K/UL (ref 138–453)
PMV BLD AUTO: 10.8 FL (ref 8.1–13.5)
POTASSIUM SERPL-SCNC: 4 MMOL/L (ref 3.7–5.3)
PROT SERPL-MCNC: 5.9 G/DL (ref 6.4–8.3)
RBC # BLD AUTO: 4.07 M/UL (ref 3.95–5.11)
RBC # BLD: ABNORMAL 10*6/UL
SODIUM SERPL-SCNC: 140 MMOL/L (ref 135–144)
TRANSFUSION STATUS: NORMAL
UNIT DIVISION: 0
UNIT ISSUE DATE/TIME: NORMAL
WBC OTHER # BLD: 7.5 K/UL (ref 3.5–11.3)

## 2024-02-19 PROCEDURE — 36415 COLL VENOUS BLD VENIPUNCTURE: CPT

## 2024-02-19 PROCEDURE — 50389 REMOVE RENAL TUBE W/FLUORO: CPT

## 2024-02-19 PROCEDURE — 80053 COMPREHEN METABOLIC PANEL: CPT

## 2024-02-19 PROCEDURE — 0TP5X0Z REMOVAL OF DRAINAGE DEVICE FROM KIDNEY, EXTERNAL APPROACH: ICD-10-PCS | Performed by: RADIOLOGY

## 2024-02-19 PROCEDURE — 6360000002 HC RX W HCPCS: Performed by: STUDENT IN AN ORGANIZED HEALTH CARE EDUCATION/TRAINING PROGRAM

## 2024-02-19 PROCEDURE — C1769 GUIDE WIRE: HCPCS

## 2024-02-19 PROCEDURE — 85025 COMPLETE CBC W/AUTO DIFF WBC: CPT

## 2024-02-19 PROCEDURE — 2580000003 HC RX 258: Performed by: STUDENT IN AN ORGANIZED HEALTH CARE EDUCATION/TRAINING PROGRAM

## 2024-02-19 PROCEDURE — 6360000004 HC RX CONTRAST MEDICATION: Performed by: RADIOLOGY

## 2024-02-19 RX ORDER — GABAPENTIN 300 MG/1
300 CAPSULE ORAL 3 TIMES DAILY
Qty: 30 CAPSULE | Refills: 0 | Status: SHIPPED | OUTPATIENT
Start: 2024-02-19 | End: 2024-02-29

## 2024-02-19 RX ADMIN — METHOCARBAMOL 500 MG: 100 INJECTION INTRAMUSCULAR; INTRAVENOUS at 05:04

## 2024-02-19 RX ADMIN — IOHEXOL 10 ML: 240 INJECTION, SOLUTION INTRATHECAL; INTRAVASCULAR; INTRAVENOUS; ORAL at 08:42

## 2024-02-19 NOTE — BRIEF OP NOTE
Brief Postoperative Note    Sylvia Weller  YOB: 1980  [id number]    Pre-operative Diagnosis: Ureteral injury; Right ureteral stent evaluation    Post-operative Diagnosis: Same    Procedure: Antegrade nephrostogram    Anesthesia: None    Surgeons/Assistants: Regulo    Estimated Blood Loss: less than 50     Complications: None    Specimens: Was Not Obtained    Findings: Patent ureteral stent; uneventful removal of nephrostomy tube    Electronically signed by Abel Rajput MD on 2/19/2024 at 9:31 AM

## 2024-02-19 NOTE — PLAN OF CARE
Problem: Discharge Planning  Goal: Discharge to home or other facility with appropriate resources  2/19/2024 1059 by Niki Biggs, RN  Outcome: Completed  2/19/2024 0522 by Anh Rodriguez, RN  Outcome: Progressing     Problem: Pain  Goal: Verbalizes/displays adequate comfort level or baseline comfort level  2/19/2024 1059 by Niki Biggs, RN  Outcome: Completed  2/19/2024 0522 by Anh Rodriguez, RN  Outcome: Progressing

## 2024-02-19 NOTE — PROGRESS NOTES
Fran Hodge, Jaguar Abdi Mostafa, Kera Lu Jr  Urology Progress Note     Subjective:   Concern of ureteral injury right side status post abdominal hysterectomy  Status post percutaneous nephrostomy tube and antegrade stent placement  Percutaneous nephrostomy tube 350 cc in Irwin 400 cc overnight  Afebrile hemodynamically stable  Creatinine improved 0.9 from 1.4      Patient Vitals for the past 24 hrs:   BP Temp Temp src Pulse Resp SpO2   02/17/24 0830 116/70 98 °F (36.7 °C) Oral 93 20 100 %   02/17/24 0236 102/64 98.2 °F (36.8 °C) -- 77 18 100 %   02/16/24 2324 -- -- -- -- 18 --   02/16/24 2129 111/68 97.3 °F (36.3 °C) -- 74 18 100 %   02/16/24 2016 134/71 -- -- 90 17 100 %   02/16/24 1916 (!) 119/93 -- -- 84 20 100 %   02/16/24 1900 115/79 -- -- 79 18 --   02/16/24 1845 110/79 -- -- 82 19 100 %   02/16/24 1745 100/82 -- -- 74 21 100 %   02/16/24 1739 113/62 -- -- 86 13 (!) 87 %   02/16/24 1700 117/84 -- -- 84 20 100 %   02/16/24 1655 (!) 130/91 -- -- 91 19 100 %   02/16/24 1650 122/84 -- -- 92 20 100 %   02/16/24 1645 129/85 -- -- 83 21 100 %   02/16/24 1640 127/88 -- -- 91 21 100 %   02/16/24 1635 (!) 131/90 -- -- 83 20 100 %   02/16/24 1630 (!) 145/100 -- -- 85 20 100 %   02/16/24 1625 (!) 153/95 -- -- 83 21 100 %   02/16/24 1620 (!) 152/94 -- -- 77 20 100 %   02/16/24 1615 (!) 152/100 -- -- 78 16 100 %   02/16/24 1610 (!) 164/95 -- -- 77 15 100 %   02/16/24 1605 (!) 162/99 -- -- 80 15 100 %   02/16/24 1600 (!) 163/89 -- -- 81 11 100 %   02/16/24 1145 (!) 155/108 -- -- 77 16 --   02/16/24 1130 133/77 -- -- 74 18 100 %   02/16/24 1116 (!) 140/87 -- -- 77 19 100 %   02/16/24 1100 (!) 155/92 -- -- 75 17 100 %   02/16/24 1045 (!) 141/95 -- -- 74 13 100 %   02/16/24 1030 (!) 145/82 -- -- 72 16 100 %   02/16/24 1015 (!) 155/83 -- -- 75 19 99 %   02/16/24 1011 (!) 159/82 -- -- 81 24 100 %   02/16/24 0945 (!) 166/84 -- -- 74 -- --   02/16/24 0917 (!) 155/93 -- -- 76 17 --   02/16/24 0900 136/85 -- -- 77 15 
    Urology Progress Note     Subjective:   Afebrile  Hemodynamically stable  Creatinine normal 0.7  Hemoglobin low this morning at 6.9 to get transfusion per OB/GYN  Tolerated clamp trial without issue      Patient Vitals for the past 24 hrs:   BP Temp Temp src Pulse Resp SpO2   02/17/24 2138 101/71 -- -- 90 -- --   02/17/24 1953 (!) 85/60 98.9 °F (37.2 °C) Oral 90 16 97 %   02/17/24 1622 93/62 98.9 °F (37.2 °C) Oral 86 16 100 %   02/17/24 1140 107/69 98.1 °F (36.7 °C) -- 73 18 100 %   02/17/24 0830 116/70 98 °F (36.7 °C) Oral 93 20 100 %       Intake/Output Summary (Last 24 hours) at 2/18/2024 0733  Last data filed at 2/18/2024 0240  Gross per 24 hour   Intake --   Output 1650 ml   Net -1650 ml       Recent Labs     02/16/24  0855 02/17/24  0751 02/18/24  0620   WBC 16.8* 13.9* 8.9   HGB 9.3* 7.6* 6.9*   HCT 30.5* 26.3* 23.8*   MCV 74.6* 78.3* 78.8*   * 353 See Reflexed IPF Result     Recent Labs     02/16/24  0855 02/17/24  0751 02/18/24  0620    137 139   K 3.7 3.5* 3.3*    103 107   CO2 22 25 23   BUN 8 7 6   CREATININE 1.4* 0.9 0.7       Recent Labs     02/16/24  1017   COLORU Yellow   PHUR 7.0   WBCUA None   RBCUA 5 TO 10   BACTERIA None   SPECGRAV 1.033*   LEUKOCYTESUR NEGATIVE   UROBILINOGEN Normal   BILIRUBINUR NEGATIVE       Additional Lab/culture results:    Physical Exam:   No acute distress  Regular rate and rhythm  Symmetric chest rise normal work of breathing  Soft nontender nondistended  Peritendinous nephrostomy tube in place draining yellow urine  Irwin catheter in place draining yellow urine       Interval Imaging Findings:    Impression:    43-year-old female  Concern of distal right ureteral injury status post total abdominal hysterectomy    Patient Active Problem List   Diagnosis    Tobacco use    Dysmenorrhea    Menorrhagia with regular cycle    S/p RALH, BS 2/14/24    Hydronephrosis       Plan:   Status post percutaneous nephrostomy tube and antegrade stent placement right 
    Urology Progress Note     Subjective:   No acute events overnight  Afebrile, vital signs stable  Ambulating hallways this morning  Denies any pain or discomfort currently  Discussed plan for antegrade nephrostogram with interventional radiology this morning, patient states that she was not aware of this procedure and is slightly upset about this-however she agrees to go forward with the procedure    A.m. labs pending    Irwin catheter removed yesterday, patient passed void trial  Right nephrostomy tube clamped yesterday, no subsequent increasing right flank pain, patient tolerated well      Patient Vitals for the past 24 hrs:   BP Temp Temp src Pulse Resp SpO2   02/18/24 1930 114/66 99 °F (37.2 °C) Oral 78 16 97 %   02/18/24 1641 127/84 99.1 °F (37.3 °C) Oral 71 16 100 %   02/18/24 1415 120/73 98.4 °F (36.9 °C) -- 77 16 100 %   02/18/24 1256 126/87 98.2 °F (36.8 °C) -- 72 16 100 %   02/18/24 1211 107/66 98.6 °F (37 °C) -- 72 16 100 %   02/18/24 1159 110/71 98.6 °F (37 °C) Oral 74 16 98 %   02/18/24 1146 126/80 98.8 °F (37.1 °C) -- 80 16 100 %   02/18/24 1115 126/80 98.8 °F (37.1 °C) -- 80 16 100 %   02/18/24 0843 110/75 98.5 °F (36.9 °C) Oral 86 16 98 %   02/18/24 0835 -- -- -- -- 16 --       Intake/Output Summary (Last 24 hours) at 2/19/2024 0756  Last data filed at 2/18/2024 1500  Gross per 24 hour   Intake 355 ml   Output 600 ml   Net -245 ml       Recent Labs     02/16/24  0855 02/17/24  0751 02/18/24  0620 02/18/24  1831   WBC 16.8* 13.9* 8.9  --    HGB 9.3* 7.6* 6.9* 8.3*   HCT 30.5* 26.3* 23.8* 27.1*   MCV 74.6* 78.3* 78.8*  --    * 353 See Reflexed IPF Result  --      Recent Labs     02/16/24  0855 02/17/24  0751 02/18/24  0620    137 139   K 3.7 3.5* 3.3*    103 107   CO2 22 25 23   BUN 8 7 6   CREATININE 1.4* 0.9 0.7       Recent Labs     02/16/24  1017   COLORU Yellow   PHUR 7.0   WBCUA None   RBCUA 5 TO 10   BACTERIA None   SPECGRAV 1.033*   LEUKOCYTESUR NEGATIVE   UROBILINOGEN 
Gynecology Progress Note    Date: 2024  Time: 4:25 AM    Sylvia Weller 43 y.o. female  S/p MELISSA, BS , presenting with abdominal pain with concern for Ileus, and right ureteral injury    Patient seen and examined. She has no complaint, she reports her discomfort, pain, N/V have much improved. She is not yet passing flatus or having BM, but denies abdominal pain. Pain is controlled.  Patient is NPO. She is urinating well via fernandez.  She denies any vaginal bleeding. She is  ambulating without difficulty. She denies Fever/Chills, Chest Pain, SOB, N/V, Calf Pain    She underwent IR guided R nephrostomy tube placement, R ureteral stent placement, and anterograde pyelogram last night. She is meeting post-procedural milestones and complains of minimal R back soreness.     Vitals:  /64   Pulse 77   Temp 98.2 °F (36.8 °C)   Resp 18   LMP 2024 (Exact Date) Comment: Urine hCG done today 2024 @0711 was negative  SpO2 100%       Intake/Output:   Last Shift: I/O last 3 completed shifts:  In: 100 [IV Piggyback:100]  Out: -   Current Shift: I/O this shift:  In: -   Out: 1400 [Urine:1000; Other:400]  350ml out per Nephrostomy Tube   250ml out per Fernandez Catheter   ~50ml/hr UOP overnight     Physical Exam:  General:  no apparent distress, alert, and cooperative  Neurologic:  alert, oriented, normal speech, no focal findings or movement disorder noted  Lungs:  no increased work of breathing  Heart:  normal rate   Abdomen: soft, mildly distended, appropriate tenderness, no CVA tenderness. No rebound, guarding, rigidity on palpation.   Incision: clean, dry, and intact  Extremities:  no calf tenderness, non edematous, SCDs in place     Assessment/Plan:  Sylvia Weller 43 y.o. female  S/p MELISSA, BS , presenting with abdominal pain with concern for Ileus, and right ureteral injury   - S/ R Nephrostomy Tube Placement and R Ureteral Stent placement with IR 24   - Urology consult, following    - 
Gynecology Progress Note    Date: 2024  Time: 6:36 AM    Sylvia Weller 43 y.o. female , POD # 4 s/p EMY TORRES on 24 admitted for management of concern for right ureteral injury    Patient seen and examined. She complained of discomfort due to the fernandez catheter and is requesting removal, will speak with Urology this AM. Pain is controlled.  Patient is  tolerating oral intake. She is urinating well with fernandez in place.  She denies any vaginal bleeding. She is  ambulating without difficulty.  She is passing flatus and having bowel movements. She denies Fever/Chills, Chest Pain, SOB, N/V, Calf Pain    Vitals:  Vitals:    24 1140 24 1622 24 1953 24 2138   BP: 107/69 93/62 (!) 85/60 101/71   Pulse: 73 86 90 90   Resp: 18 16 16    Temp: 98.1 °F (36.7 °C) 98.9 °F (37.2 °C) 98.9 °F (37.2 °C)    TempSrc:  Oral Oral    SpO2: 100% 100% 97%          Intake/Output:   Last Shift: I/O last 3 completed shifts:  In: 100 [IV Piggyback:100]  Out: 2950 [Urine:2550; Other:400]  Current Shift: I/O this shift:  In: -   Out: 700 [Urine:700]  700 mL out in last 7 hrs ~ 70mL/hr      Physical Exam:  General:  no apparent distress, alert, and cooperative  Neurologic:  alert, oriented, normal speech, no focal findings or movement disorder noted  Lungs:  No increased work of breathing, good air exchange  Heart:  regular rate and rhythm    Abdomen: soft, non-distended, appropriate tenderness, no CVA tenderness  Incision: clean, dry, and intact; left sided laparoscopic incision with suspected subcutaneous hematoma, stable in size and not expanding  : Right nephrostomy tube in place and clamped  Extremities:  no calf tenderness, non edematous, SCDs on and functioning    Lab:  No results found for this or any previous visit (from the past 12 hour(s)).      Assessment/Plan:POD # 4 s/p EMY TORRES on 24 admitted for management of concern for right ureteral injury   - Patient reports discomfort from fernandez 
OB/GYN Resident Interval Note    Spoke with RNrebecca in place. UOP appropriate overnight, 70mL/hr.     Ana Donahue,   OB/GYN Resident   
Patient to IR for right nephrostogram.  Dr. Rajput and KP/HH RTs at bedside.  Site prepped and draped.  Contrast injected to right nephrostomy tube then flushed with saline.  Right PNT removed without difficulty.  Dry sterile drsg placed to site. Patient tolerated well.    
calculus. Mild stool burden. Fluid-filled nondilated small bowel loops without evidence for small bowel obstruction. Small to moderate free fluid in the pelvis and right pericolic gutter.   - GYN primary team, Urology and IR consulted   - S/p right nephrostomy tube and stent placement 2/16. Patient had antegrade nephrostogram at that time which showed stent placement into bladder. Nephrostomy tube was clamped 2/17 and pain continues to be well controlled. Plan for repeat antegrade nephrostogram today per Urology with possible removal of nephrostomy tube afterwards pending findings. Patient will need stent for 4-6 weeks, Urology to follow outpatient, appreciate recommendations while inpatient.   - Doing well, vitals stable   - Abdominal exam: overall benign, appropriate postoperative tenderness. Incisions clean, dry, and intact; left sided laparoscopic incision with suspected subcutaneous hematoma, stable in size and not expanding. Continue to monitor closely.   - S/p fernandez catheter, patient reports spontaneously voiding multiple times overnight    - No UOP documented, will f/u with nursing staff. Patient states she voided several times overnight.   - Continue IVF   - Encourage ambulation and use of incentive spirometer   - Pain control: Tylenol, Gabapentin, Flexeril prn   - Labs: CBC, CMP qd. Creatinine downtrending, 1.4>0.9.>0.7.   - DVT Proph: SCDs and Lovenox 40mg qd   - Abx: None indicated   - Diet: Regular diet   - Path: pending   - Continue post-op care, please page with any questions.    Ileus   - Concern for ileus on initial admission, due to CT A/P and KUB, as well as patient not passing flatus.   - However, patient has been passing flatus and having bowel movements as well as tolerating regular diet.   - Will continue to monitor closely    Hypokalemia   - K 3.3 yesterday, CMP ordered this AM   - K replacement ordered   - Clinically asymptomatic    Will update Dr. Darlene Donahue, DO  Ob/Gyn

## 2024-02-20 LAB — SURGICAL PATHOLOGY REPORT: NORMAL

## 2024-02-21 ENCOUNTER — TELEPHONE (OUTPATIENT)
Dept: OBGYN CLINIC | Age: 44
End: 2024-02-21

## 2024-02-21 NOTE — TELEPHONE ENCOUNTER
GYN pt who had hyst surgery on 2/14/24 calling in to make an appt which was made.    Pt also asking if it is ok to restart using her nicotine patches?    Pls advise

## 2024-02-23 ENCOUNTER — TELEPHONE (OUTPATIENT)
Dept: OBGYN CLINIC | Age: 44
End: 2024-02-23

## 2024-02-23 NOTE — TELEPHONE ENCOUNTER
Gyn pt    Hysterectomy 2.14.24  Pt wondering when she is able to start nicotine patch? Pt states urethral tube placed procedure coming up should she wait until after that procedure or okay to start them now. Pt will contact pharmacy to see if they have any recommendations on her colace medication she is also willing to wait until her appt Monday 2.26.24 w/ Dr. Colon.    Please advise.

## 2024-02-23 NOTE — TELEPHONE ENCOUNTER
Spoke w/ pt about the note Dr. Nichols found that stated Dr. Colon said it was okay to restart the nicotine patch.

## 2024-02-24 LAB
EKG ATRIAL RATE: 98 BPM
EKG P AXIS: 77 DEGREES
EKG P-R INTERVAL: 130 MS
EKG Q-T INTERVAL: 356 MS
EKG QRS DURATION: 80 MS
EKG QTC CALCULATION (BAZETT): 454 MS
EKG R AXIS: 50 DEGREES
EKG T AXIS: 54 DEGREES
EKG VENTRICULAR RATE: 98 BPM

## 2024-02-26 ENCOUNTER — OFFICE VISIT (OUTPATIENT)
Dept: OBGYN CLINIC | Age: 44
End: 2024-02-26
Payer: COMMERCIAL

## 2024-02-26 VITALS — WEIGHT: 178 LBS | SYSTOLIC BLOOD PRESSURE: 122 MMHG | BODY MASS INDEX: 33.63 KG/M2 | DIASTOLIC BLOOD PRESSURE: 82 MMHG

## 2024-02-26 DIAGNOSIS — Z98.890 POST-OPERATIVE STATE: ICD-10-CM

## 2024-02-26 DIAGNOSIS — N13.30 HYDRONEPHROSIS, UNSPECIFIED HYDRONEPHROSIS TYPE: ICD-10-CM

## 2024-02-26 DIAGNOSIS — Z01.818 PREOPERATIVE EXAM FOR GYNECOLOGIC SURGERY: Primary | ICD-10-CM

## 2024-02-26 PROCEDURE — G8428 CUR MEDS NOT DOCUMENT: HCPCS | Performed by: OBSTETRICS & GYNECOLOGY

## 2024-02-26 PROCEDURE — G8484 FLU IMMUNIZE NO ADMIN: HCPCS | Performed by: OBSTETRICS & GYNECOLOGY

## 2024-02-26 PROCEDURE — 4004F PT TOBACCO SCREEN RCVD TLK: CPT | Performed by: OBSTETRICS & GYNECOLOGY

## 2024-02-26 PROCEDURE — 1111F DSCHRG MED/CURRENT MED MERGE: CPT | Performed by: OBSTETRICS & GYNECOLOGY

## 2024-02-26 PROCEDURE — G8417 CALC BMI ABV UP PARAM F/U: HCPCS | Performed by: OBSTETRICS & GYNECOLOGY

## 2024-02-26 PROCEDURE — 99212 OFFICE O/P EST SF 10 MIN: CPT | Performed by: OBSTETRICS & GYNECOLOGY

## 2024-02-26 NOTE — PATIENT INSTRUCTIONS
You may slowly resume your normal daily activities but refrain from intercourse.  Follow-up with urology as instructed by them.  Return to the office in 4 weeks for final postop checkup or as needed.

## 2024-02-26 NOTE — PROGRESS NOTES
[facility]  [address]  [address]  [place] [facility] [place] [id number]       Date:2/26/24    Chief complaint:  Chief Complaint   Patient presents with    Post-Op Check     Had Hyst 02/14/24       Sylvia Weller returns today for postop checkup after having an unremarkable RAVH/BS and lysis of adhesions on 2/14/2024 for symptomatic uterine fibroids.  Postop course was complicated by acute right flank pain prompting an ED visit on POD #2.  CT scan revealed hydronephrosis of the right ureter with a distal blockage.  IR was consulted and nephrostomy tube was placed along with right ureteral stent.  Nephrostomy tube has since been removed and she will see urology on April 5..  She denies any fever, chills, nausea/vomiting, significant abdominal/pelvic discomfort, vaginal bleeding or UTI symptoms.  She's having normal bowel and urinary function and ambulating with no significant difficulty.      Operative findings revealed  Uterus sounded to 12 cm and was anteverted. Normal appearing external genitalia without lesions. Normal appearing vagina and cervix without lesions. Normal bilateral fallopian tubes, and ovaries. Normal uterus with multiple subserosal and intramural fibroids, largest fibroid fundal and pedunculated, bilateral ureteral peristalsis   .      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   Weight:  Uterus and cervix 452 grams.  .    Physical exam      Vitals:    02/26/24 0941   BP: 122/82   Site: Right Upper Arm   Position: Sitting   Cuff Size: Large

## 2024-02-28 DIAGNOSIS — N76.0 ACUTE VAGINITIS: ICD-10-CM

## 2024-03-01 RX ORDER — METRONIDAZOLE 7.5 MG/G
GEL VAGINAL
Qty: 70 G | Refills: 2 | OUTPATIENT
Start: 2024-03-01

## 2024-03-01 RX ORDER — FLUCONAZOLE 150 MG/1
150 TABLET ORAL ONCE
Qty: 2 TABLET | Refills: 0 | OUTPATIENT
Start: 2024-03-01 | End: 2024-03-01

## 2024-03-11 ENCOUNTER — TELEPHONE (OUTPATIENT)
Dept: OBGYN CLINIC | Age: 44
End: 2024-03-11

## 2024-03-15 PROBLEM — Z98.890 POST-OPERATIVE STATE: Status: RESOLVED | Noted: 2024-02-14 | Resolved: 2024-03-15

## 2024-03-21 ENCOUNTER — TELEPHONE (OUTPATIENT)
Dept: OBGYN CLINIC | Age: 44
End: 2024-03-21

## 2024-03-21 NOTE — TELEPHONE ENCOUNTER
Pt was scheduled on 03/25/24 for post op and requires r/s from office. I am not sure where or when you would like for pt to be seen as her surgery was 02/14/24, this would be her 2nd post op visit.     Pt is to go back to work in April and would like to be seen prior to going back to work.

## 2024-03-30 NOTE — PROGRESS NOTES
resume her normal activities and return to the office in November for annual or prn.      Manuel Colon MD, Mph, FACOG.   [facility]

## 2024-03-30 NOTE — PATIENT INSTRUCTIONS
You may resume all your normal activities.  Plan on returning to the office in November to resume your annual exams.  Enjoy the rest of your year!

## 2024-04-02 ENCOUNTER — OFFICE VISIT (OUTPATIENT)
Dept: OBGYN CLINIC | Age: 44
End: 2024-04-02
Payer: COMMERCIAL

## 2024-04-02 ENCOUNTER — TELEPHONE (OUTPATIENT)
Dept: UROLOGY | Age: 44
End: 2024-04-02

## 2024-04-02 VITALS — WEIGHT: 171 LBS | BODY MASS INDEX: 32.31 KG/M2 | SYSTOLIC BLOOD PRESSURE: 122 MMHG | DIASTOLIC BLOOD PRESSURE: 82 MMHG

## 2024-04-02 DIAGNOSIS — Z01.818 PREOPERATIVE EXAM FOR GYNECOLOGIC SURGERY: Primary | ICD-10-CM

## 2024-04-02 DIAGNOSIS — Z98.890 POST-OPERATIVE STATE: ICD-10-CM

## 2024-04-02 PROCEDURE — 99213 OFFICE O/P EST LOW 20 MIN: CPT | Performed by: OBSTETRICS & GYNECOLOGY

## 2024-04-02 NOTE — TELEPHONE ENCOUNTER
Patient is scheduled for surgery on 4/12 at 1:00 PM.  Left voicemail with date, time, instructions and to call back to confirm.    Patient called back and is upset because she thought her surgery was scheduled for 4/5.  Let patient know that she did have a appointment in our clinic scheduled but was cancelled because she needed surgery.  Patient confirmed and verbalized understanding.

## 2024-04-10 ENCOUNTER — TELEPHONE (OUTPATIENT)
Dept: UROLOGY | Age: 44
End: 2024-04-10

## 2024-04-10 NOTE — TELEPHONE ENCOUNTER
Pt called insisting to speak with clinical staff regarding her paper work. Writer unable to reach clinical staff. Pt requested office to call her back urgently.

## 2024-04-11 ENCOUNTER — ANESTHESIA EVENT (OUTPATIENT)
Dept: OPERATING ROOM | Age: 44
End: 2024-04-11
Payer: COMMERCIAL

## 2024-04-11 NOTE — ANESTHESIA PRE PROCEDURE
Take 1,300 mg by mouth    ProviderHeriberto MD   ALBUTEROL IN Inhale into the lungs as needed    Heriberto Qiu MD   nicotine (NICODERM CQ) 7 MG/24HR Place 1 patch onto the skin daily for 14 days 1/16/24 1/30/24  Manuel Colon MD   metroNIDAZOLE (METROGEL) 0.75 % vaginal gel APPLY 1 APPLICATORFUL INTRAVAGINALLY TWICE WEEKLY FOR 3 MONTHS 11/29/23   ProviderHeriberto MD   cyclobenzaprine (FLEXERIL) 10 MG tablet Take 1 tablet by mouth as needed for Muscle spasms 2/13/23   Caitlin Goddard DO   FLUoxetine (PROZAC) 20 MG capsule Take 1 capsule by mouth in the morning. 8/1/22   Caitlin Goddard DO       Current medications:    No current facility-administered medications for this encounter.       Allergies:  No Known Allergies    Problem List:    Patient Active Problem List   Diagnosis Code    Tobacco use Z72.0    Dysmenorrhea N94.6    Menorrhagia with regular cycle N92.0    Hydronephrosis N13.30       Past Medical History:        Diagnosis Date    Anxiety     Arthritis     Knee-left    Depression     Dysmenorrhea     History of blood transfusion     History of bronchitis     Migraine     Muscle spasm     Ovarian cyst     Personal history of other medical treatment     Patient wears oral braces    Under care of team     PCP: Beverley Lala PA-C, last visit 8/2023    Under care of team     Podiatry: Dallas Mabry DPM, [place], last visit 1/2024    Under care of team     OBGYN: Ricardo Buck, last visit 12/2023    Uterine fibroid        Past Surgical History:        Procedure Laterality Date    HYSTERECTOMY (CERVIX STATUS UNKNOWN) N/A 2/14/2024    XI ROBOTIC LAPAROSCOPIC HYSTERECTOMY, BILATERAL SALPINGECTOMY, LYSIS OF ADHESION performed by Manuel Colon MD at [facility] OR    IR NEPHROSTOMY PERCUTANEOUS RIGHT  2/16/2024    IR NEPHROSTOMY PERCUTANEOUS RIGHT 2/16/2024 Onur Ferreira MD [facility] SPECIAL PROCEDURES    LAPAROSCOPIC HYSTERECTOMY Bilateral 02/14/2024    XI ROBOTIC

## 2024-04-12 ENCOUNTER — HOSPITAL ENCOUNTER (OUTPATIENT)
Age: 44
Setting detail: OUTPATIENT SURGERY
Discharge: HOME | End: 2024-04-12
Attending: UROLOGY | Admitting: UROLOGY
Payer: COMMERCIAL

## 2024-04-12 ENCOUNTER — APPOINTMENT (OUTPATIENT)
Dept: GENERAL RADIOLOGY | Age: 44
End: 2024-04-12
Attending: UROLOGY
Payer: COMMERCIAL

## 2024-04-12 ENCOUNTER — ANESTHESIA (OUTPATIENT)
Dept: OPERATING ROOM | Age: 44
End: 2024-04-12
Payer: COMMERCIAL

## 2024-04-12 VITALS
DIASTOLIC BLOOD PRESSURE: 81 MMHG | TEMPERATURE: 96.8 F | HEART RATE: 70 BPM | SYSTOLIC BLOOD PRESSURE: 122 MMHG | OXYGEN SATURATION: 94 % | RESPIRATION RATE: 13 BRPM

## 2024-04-12 DIAGNOSIS — G89.18 POST-OP PAIN: Primary | ICD-10-CM

## 2024-04-12 PROCEDURE — 6370000000 HC RX 637 (ALT 250 FOR IP): Performed by: ANESTHESIOLOGY

## 2024-04-12 PROCEDURE — 6360000004 HC RX CONTRAST MEDICATION: Performed by: UROLOGY

## 2024-04-12 PROCEDURE — 3700000000 HC ANESTHESIA ATTENDED CARE: Performed by: UROLOGY

## 2024-04-12 PROCEDURE — 2580000003 HC RX 258: Performed by: NURSE ANESTHETIST, CERTIFIED REGISTERED

## 2024-04-12 PROCEDURE — 7100000010 HC PHASE II RECOVERY - FIRST 15 MIN: Performed by: UROLOGY

## 2024-04-12 PROCEDURE — 2500000003 HC RX 250 WO HCPCS: Performed by: NURSE ANESTHETIST, CERTIFIED REGISTERED

## 2024-04-12 PROCEDURE — C1769 GUIDE WIRE: HCPCS | Performed by: UROLOGY

## 2024-04-12 PROCEDURE — 3600000013 HC SURGERY LEVEL 3 ADDTL 15MIN: Performed by: UROLOGY

## 2024-04-12 PROCEDURE — 7100000011 HC PHASE II RECOVERY - ADDTL 15 MIN: Performed by: UROLOGY

## 2024-04-12 PROCEDURE — 6370000000 HC RX 637 (ALT 250 FOR IP): Performed by: NURSE ANESTHETIST, CERTIFIED REGISTERED

## 2024-04-12 PROCEDURE — 7100000001 HC PACU RECOVERY - ADDTL 15 MIN: Performed by: UROLOGY

## 2024-04-12 PROCEDURE — 3600000003 HC SURGERY LEVEL 3 BASE: Performed by: UROLOGY

## 2024-04-12 PROCEDURE — C1758 CATHETER, URETERAL: HCPCS | Performed by: UROLOGY

## 2024-04-12 PROCEDURE — 3700000001 HC ADD 15 MINUTES (ANESTHESIA): Performed by: UROLOGY

## 2024-04-12 PROCEDURE — C1726 CATH, BAL DIL, NON-VASCULAR: HCPCS | Performed by: UROLOGY

## 2024-04-12 PROCEDURE — 7100000000 HC PACU RECOVERY - FIRST 15 MIN: Performed by: UROLOGY

## 2024-04-12 PROCEDURE — 6360000002 HC RX W HCPCS: Performed by: ANESTHESIOLOGY

## 2024-04-12 PROCEDURE — 2709999900 HC NON-CHARGEABLE SUPPLY: Performed by: UROLOGY

## 2024-04-12 PROCEDURE — 2580000003 HC RX 258: Performed by: UROLOGY

## 2024-04-12 PROCEDURE — 6360000002 HC RX W HCPCS: Performed by: NURSE ANESTHETIST, CERTIFIED REGISTERED

## 2024-04-12 RX ORDER — SODIUM CHLORIDE 0.9 % (FLUSH) 0.9 %
5-40 SYRINGE (ML) INJECTION EVERY 12 HOURS SCHEDULED
Status: DISCONTINUED | OUTPATIENT
Start: 2024-04-12 | End: 2024-04-12 | Stop reason: HOSPADM

## 2024-04-12 RX ORDER — MAGNESIUM HYDROXIDE 1200 MG/15ML
LIQUID ORAL PRN
Status: DISCONTINUED | OUTPATIENT
Start: 2024-04-12 | End: 2024-04-12 | Stop reason: ALTCHOICE

## 2024-04-12 RX ORDER — SODIUM CHLORIDE, SODIUM LACTATE, POTASSIUM CHLORIDE, CALCIUM CHLORIDE 600; 310; 30; 20 MG/100ML; MG/100ML; MG/100ML; MG/100ML
INJECTION, SOLUTION INTRAVENOUS CONTINUOUS PRN
Status: DISCONTINUED | OUTPATIENT
Start: 2024-04-12 | End: 2024-04-12 | Stop reason: SDUPTHER

## 2024-04-12 RX ORDER — HYDROCODONE BITARTRATE AND ACETAMINOPHEN 5; 325 MG/1; MG/1
1 TABLET ORAL EVERY 6 HOURS PRN
Qty: 10 TABLET | Refills: 0 | Status: SHIPPED | OUTPATIENT
Start: 2024-04-12 | End: 2024-04-19

## 2024-04-12 RX ORDER — NALOXONE HYDROCHLORIDE 0.4 MG/ML
INJECTION, SOLUTION INTRAMUSCULAR; INTRAVENOUS; SUBCUTANEOUS PRN
Status: DISCONTINUED | OUTPATIENT
Start: 2024-04-12 | End: 2024-04-12 | Stop reason: HOSPADM

## 2024-04-12 RX ORDER — ONDANSETRON 2 MG/ML
INJECTION INTRAMUSCULAR; INTRAVENOUS PRN
Status: DISCONTINUED | OUTPATIENT
Start: 2024-04-12 | End: 2024-04-12 | Stop reason: SDUPTHER

## 2024-04-12 RX ORDER — LIDOCAINE HYDROCHLORIDE 10 MG/ML
INJECTION, SOLUTION EPIDURAL; INFILTRATION; INTRACAUDAL; PERINEURAL PRN
Status: DISCONTINUED | OUTPATIENT
Start: 2024-04-12 | End: 2024-04-12 | Stop reason: SDUPTHER

## 2024-04-12 RX ORDER — CEFAZOLIN SODIUM 1 G/3ML
INJECTION, POWDER, FOR SOLUTION INTRAMUSCULAR; INTRAVENOUS PRN
Status: DISCONTINUED | OUTPATIENT
Start: 2024-04-12 | End: 2024-04-12 | Stop reason: SDUPTHER

## 2024-04-12 RX ORDER — HYDROCODONE BITARTRATE AND ACETAMINOPHEN 5; 325 MG/1; MG/1
1 TABLET ORAL
Status: COMPLETED | OUTPATIENT
Start: 2024-04-12 | End: 2024-04-12

## 2024-04-12 RX ORDER — FENTANYL CITRATE 50 UG/ML
INJECTION, SOLUTION INTRAMUSCULAR; INTRAVENOUS PRN
Status: DISCONTINUED | OUTPATIENT
Start: 2024-04-12 | End: 2024-04-12 | Stop reason: SDUPTHER

## 2024-04-12 RX ORDER — MIDAZOLAM HYDROCHLORIDE 1 MG/ML
INJECTION INTRAMUSCULAR; INTRAVENOUS PRN
Status: DISCONTINUED | OUTPATIENT
Start: 2024-04-12 | End: 2024-04-12 | Stop reason: SDUPTHER

## 2024-04-12 RX ORDER — DEXAMETHASONE SODIUM PHOSPHATE 10 MG/ML
INJECTION INTRAMUSCULAR; INTRAVENOUS PRN
Status: DISCONTINUED | OUTPATIENT
Start: 2024-04-12 | End: 2024-04-12 | Stop reason: SDUPTHER

## 2024-04-12 RX ORDER — GLYCOPYRROLATE 0.2 MG/ML
INJECTION INTRAMUSCULAR; INTRAVENOUS PRN
Status: DISCONTINUED | OUTPATIENT
Start: 2024-04-12 | End: 2024-04-12 | Stop reason: SDUPTHER

## 2024-04-12 RX ORDER — ALBUTEROL SULFATE 90 UG/1
AEROSOL, METERED RESPIRATORY (INHALATION) PRN
Status: DISCONTINUED | OUTPATIENT
Start: 2024-04-12 | End: 2024-04-12 | Stop reason: SDUPTHER

## 2024-04-12 RX ORDER — SODIUM CHLORIDE 0.9 % (FLUSH) 0.9 %
5-40 SYRINGE (ML) INJECTION PRN
Status: DISCONTINUED | OUTPATIENT
Start: 2024-04-12 | End: 2024-04-12 | Stop reason: HOSPADM

## 2024-04-12 RX ORDER — MAGNESIUM HYDROXIDE 1200 MG/15ML
LIQUID ORAL CONTINUOUS PRN
Status: COMPLETED | OUTPATIENT
Start: 2024-04-12 | End: 2024-04-12

## 2024-04-12 RX ORDER — ONDANSETRON 2 MG/ML
4 INJECTION INTRAMUSCULAR; INTRAVENOUS
Status: DISCONTINUED | OUTPATIENT
Start: 2024-04-12 | End: 2024-04-12 | Stop reason: HOSPADM

## 2024-04-12 RX ORDER — LABETALOL HYDROCHLORIDE 5 MG/ML
10 INJECTION, SOLUTION INTRAVENOUS
Status: DISCONTINUED | OUTPATIENT
Start: 2024-04-12 | End: 2024-04-12 | Stop reason: HOSPADM

## 2024-04-12 RX ORDER — HYDRALAZINE HYDROCHLORIDE 20 MG/ML
10 INJECTION INTRAMUSCULAR; INTRAVENOUS
Status: DISCONTINUED | OUTPATIENT
Start: 2024-04-12 | End: 2024-04-12 | Stop reason: HOSPADM

## 2024-04-12 RX ORDER — PROPOFOL 10 MG/ML
INJECTION, EMULSION INTRAVENOUS PRN
Status: DISCONTINUED | OUTPATIENT
Start: 2024-04-12 | End: 2024-04-12 | Stop reason: SDUPTHER

## 2024-04-12 RX ORDER — TAMSULOSIN HYDROCHLORIDE 0.4 MG/1
0.4 CAPSULE ORAL DAILY
Qty: 30 CAPSULE | Refills: 3 | Status: SHIPPED | OUTPATIENT
Start: 2024-04-12

## 2024-04-12 RX ORDER — SODIUM CHLORIDE 9 MG/ML
INJECTION, SOLUTION INTRAVENOUS PRN
Status: DISCONTINUED | OUTPATIENT
Start: 2024-04-12 | End: 2024-04-12 | Stop reason: HOSPADM

## 2024-04-12 RX ADMIN — LIDOCAINE HYDROCHLORIDE 50 MG: 10 INJECTION, SOLUTION EPIDURAL; INFILTRATION; INTRACAUDAL; PERINEURAL at 13:43

## 2024-04-12 RX ADMIN — ONDANSETRON 4 MG: 2 INJECTION INTRAMUSCULAR; INTRAVENOUS at 13:48

## 2024-04-12 RX ADMIN — PROPOFOL 40 MG: 10 INJECTION, EMULSION INTRAVENOUS at 13:45

## 2024-04-12 RX ADMIN — CEFAZOLIN 2 G: 1 INJECTION, POWDER, FOR SOLUTION INTRAMUSCULAR; INTRAVENOUS at 13:56

## 2024-04-12 RX ADMIN — PROPOFOL 160 MG: 10 INJECTION, EMULSION INTRAVENOUS at 13:43

## 2024-04-12 RX ADMIN — ALBUTEROL SULFATE 10 PUFF: 90 AEROSOL, METERED RESPIRATORY (INHALATION) at 14:41

## 2024-04-12 RX ADMIN — GLYCOPYRROLATE 0.2 MG: 0.2 INJECTION INTRAMUSCULAR; INTRAVENOUS at 14:34

## 2024-04-12 RX ADMIN — DEXAMETHASONE SODIUM PHOSPHATE 10 MG: 10 INJECTION INTRAMUSCULAR; INTRAVENOUS at 13:48

## 2024-04-12 RX ADMIN — FENTANYL CITRATE 50 MCG: 50 INJECTION, SOLUTION INTRAMUSCULAR; INTRAVENOUS at 13:38

## 2024-04-12 RX ADMIN — MIDAZOLAM 2 MG: 1 INJECTION INTRAMUSCULAR; INTRAVENOUS at 13:38

## 2024-04-12 RX ADMIN — ALBUTEROL SULFATE 10 PUFF: 90 AEROSOL, METERED RESPIRATORY (INHALATION) at 13:47

## 2024-04-12 RX ADMIN — HYDROMORPHONE HYDROCHLORIDE 0.25 MG: 1 INJECTION, SOLUTION INTRAMUSCULAR; INTRAVENOUS; SUBCUTANEOUS at 15:27

## 2024-04-12 RX ADMIN — SODIUM CHLORIDE, POTASSIUM CHLORIDE, SODIUM LACTATE AND CALCIUM CHLORIDE: 600; 310; 30; 20 INJECTION, SOLUTION INTRAVENOUS at 13:37

## 2024-04-12 RX ADMIN — HYDROCODONE BITARTRATE AND ACETAMINOPHEN 1 TABLET: 5; 325 TABLET ORAL at 16:52

## 2024-04-12 RX ADMIN — HYDROMORPHONE HYDROCHLORIDE 0.5 MG: 1 INJECTION, SOLUTION INTRAMUSCULAR; INTRAVENOUS; SUBCUTANEOUS at 15:12

## 2024-04-12 RX ADMIN — FENTANYL CITRATE 50 MCG: 50 INJECTION, SOLUTION INTRAMUSCULAR; INTRAVENOUS at 13:43

## 2024-04-12 ASSESSMENT — PAIN DESCRIPTION - LOCATION
LOCATION: ABDOMEN
LOCATION: OTHER (COMMENT)

## 2024-04-12 ASSESSMENT — PAIN - FUNCTIONAL ASSESSMENT
PAIN_FUNCTIONAL_ASSESSMENT: 0-10
PAIN_FUNCTIONAL_ASSESSMENT: NONE - DENIES PAIN

## 2024-04-12 ASSESSMENT — PAIN SCALES - GENERAL
PAINLEVEL_OUTOF10: 4
PAINLEVEL_OUTOF10: 7
PAINLEVEL_OUTOF10: 6
PAINLEVEL_OUTOF10: 6
PAINLEVEL_OUTOF10: 4
PAINLEVEL_OUTOF10: 6

## 2024-04-12 ASSESSMENT — PAIN DESCRIPTION - DESCRIPTORS: DESCRIPTORS: ACHING

## 2024-04-12 NOTE — DISCHARGE INSTRUCTIONS
Cystoscopy stent placement with string:  You may see blood in the urine after the procedure.  This should resolve over the next couple days.  Please stay hydrated.  You may see intermittent blood in the urine while the stent is in place.  This is expected.  You may experience flank pain, and/or frequency/urgency of urination while the stent is in place.  Take medications as prescribed to help with these symptoms.    Patient ok to discharge home in good condition    No heavy lifting, >10 lbs for today  Pt should avoid strenuous activity for today  Pt should walk moderately at home  Pt ok to shower   Pt may resume diet as tolerated  Pt should take prescriptions as directed  No driving while on narcotics  Please call attending physician or hospital  with questions  Call or Present to ED if fever (> 101F), intractable nausea vomiting or pain.    Pt should follow up with Dr. Cote, in about 2 weeks

## 2024-04-12 NOTE — OP NOTE
Operative Note    [facility].  [place], [place]. [place]    DATE:  4/12/2024    SURGEON:   Urban Cote Jr., MD    ASSISTANT:  Natividad Valencia, PGY-2    PREOPERATIVE DIAGNOSIS:  Injury of right ureter, initial encounter [S37.10XA]    POSTOPERATIVE DIAGNOSIS:  As above    PROCEDURE PERFORMED:  Cystoscopy  Right retrograde pyelogram  Right ureteroscopy  Right ureteral balloon dilation  Right stent exchange (6 Fr x 24 cm)    ANESTHESIA:  General    COMPLICATIONS:  None.    ESTIMATED BLOOD LOSS:  < 5 cc     SPECIMENS:  * No specimens in log *     DRAINS:  Right 6 Fr x 24 cm ureteral stent    FINDINGS:  Right ureteral narrowing 2/3 of the way down the ureter over the site of the iliacs seen on ureteroscopy  Delayed excretion of contrast from right ureter  Successful right distal ureteral balloon dilation      INDICATIONS FOR PROCEDURE:  The patient is a 43 y.o. female with a history of right ureteral injury after total abdominal hysterectomy. She currently has a right ureteral stent in place. Presents today for a right retrograde pyelogram with possible right stent exchange and ureteral balloon dilation. Risks, benefits, and alternatives were discussed with the patient.  Informed consent was obtained.    DETAILS OF THE PROCEDURE:  The patient was correctly identified in the preoperative holding area. she was brought back to the operating room and General anesthesia was administered.  she was then prepped and draped in the usual sterile fashion in the dorsal lithotomy position. EPC cuffs were on, in place, and fully functional. she was given 2gm ancef IV  for antibiotic prophylaxis.  After appropriate time-out was performed with all parties agreeing, a 22 Fr cystoscope with a 30 degree lens was inserted into the bladder.   Bladder was inspected including the dome posterior and lateral walls as well as the anterior wall and trigone.  These were found to be free of tumors or mucosal abnormalities.

## 2024-04-13 NOTE — ANESTHESIA POSTPROCEDURE EVALUATION
Department of Anesthesiology  Postprocedure Note    Patient: Sylvia Weller  MRN: [id number]  YOB: 1980  Date of evaluation: 4/12/2024    Procedure Summary       Date: 04/12/24 Room / Location: [address]    Anesthesia Start: 1337 Anesthesia Stop: 1451    Procedure: CYSTOSCOPY, RETROGRADE PYELOGRAM, STENT EXCHANGE, BALLOON DILATION,URETEROSCOPY (Right) Diagnosis:       Injury of right ureter, initial encounter      (Injury of right ureter, initial encounter [S37.10XA])    Surgeons: Urban Cote Jr., MD Responsible Provider: Kirk Eason MD    Anesthesia Type: general ASA Status: 2            Anesthesia Type: No value filed.    Nam Phase I: Nam Score: 10    Nam Phase II: Nam Score: 10  POST-OP ANESTHESIA NOTE       /81   Pulse 70   Temp 96.8 °F (36 °C) (Temporal)   Resp 13   LMP 02/02/2024 (Exact Date) Comment: Urine hCG done today 2/14/2024 @0711 was negative  SpO2 94%    Pain Assessment: 0-10  Pain Level: 6       Anesthesia Post Evaluation    Patient location during evaluation: PACU  Patient participation: complete - patient participated  Level of consciousness: awake  Pain score: 6  Airway patency: patent  Nausea & Vomiting: no vomiting and no nausea  Cardiovascular status: hemodynamically stable  Respiratory status: acceptable  Hydration status: stable  Pain management: adequate        No notable events documented.

## 2024-04-16 NOTE — H&P
[facility]  Urology H&P Note     Patient:  Sylvia Weller  MRN: [id number]  YOB: 1980    ATTENDING: Urban Cote Jr, MD     CHIEF COMPLAINT:  right ureteral injury obstruction hydronephrosis    HISTORY OF PRESENT ILLNESS:   The patient is a 43 y.o. female who presents with right ureteral injury after hysterectomy causing obstruction and hydronephrosis    Patient's old records, notes and chart reviewed and summarized above.     Past Medical History:    Past Medical History:   Diagnosis Date    Anxiety     Arthritis     Knee-left    Depression     Dysmenorrhea     History of blood transfusion     History of bronchitis     Migraine     Muscle spasm     Ovarian cyst     Personal history of other medical treatment     Patient wears oral braces    Under care of team     PCP: Beverley Lala PA-C, last visit 8/2023    Under care of team     Podiatry: Dallas Mabry DPM, [place], last visit 1/2024    Under care of team     OBGYN: Ricardo Buck, last visit 12/2023    Uterine fibroid        Past Surgical History:    Past Surgical History:   Procedure Laterality Date    CYSTOSCOPY Right 4/12/2024    CYSTOSCOPY, RETROGRADE PYELOGRAM, STENT EXCHANGE, BALLOON DILATION,URETEROSCOPY performed by Urban Cote Jr., MD at [facility] OR    CYSTOSCOPY W/ URETERAL STENT PLACEMENT Right 04/12/2024    CYSTOSCOPY, RETROGRADE PYELOGRAM, STENT EXCHANGE, BALLOON DILATION,URETEROSCOPY (Right)    HYSTERECTOMY (CERVIX STATUS UNKNOWN) N/A 02/14/2024    XI ROBOTIC LAPAROSCOPIC HYSTERECTOMY, BILATERAL SALPINGECTOMY, LYSIS OF ADHESION performed by Manuel Colon MD at [facility] OR    IR NEPHROSTOMY PERCUTANEOUS RIGHT  02/16/2024    IR NEPHROSTOMY PERCUTANEOUS RIGHT 2/16/2024 Onur Ferreira MD [facility] SPECIAL PROCEDURES    LAPAROSCOPIC HYSTERECTOMY Bilateral 02/14/2024    XI ROBOTIC LAPAROSCOPIC HYSTERECTOMY, BILATERAL SALPINGECTOMY, LYSIS OF ADHESION       Medications Prior to Admission:   Prior to Admission

## 2024-04-18 ENCOUNTER — TELEPHONE (OUTPATIENT)
Dept: UROLOGY | Age: 44
End: 2024-04-18

## 2024-04-19 ENCOUNTER — TELEPHONE (OUTPATIENT)
Dept: UROLOGY | Age: 44
End: 2024-04-19

## 2024-04-19 NOTE — TELEPHONE ENCOUNTER
Patient is scheduled for surgery on 4/26 at 10:30 AM.  Talked to patient and gave her the date, time and instructions.  Patient confirmed and verbalized understanding.

## 2024-04-24 RX ORDER — DEXTROAMPHETAMINE SACCHARATE, AMPHETAMINE ASPARTATE, DEXTROAMPHETAMINE SULFATE AND AMPHETAMINE SULFATE 3.75; 3.75; 3.75; 3.75 MG/1; MG/1; MG/1; MG/1
1 TABLET ORAL 2 TIMES DAILY
COMMUNITY
Start: 2024-02-07

## 2024-04-26 ENCOUNTER — ANESTHESIA (OUTPATIENT)
Dept: OPERATING ROOM | Age: 44
End: 2024-04-26
Payer: COMMERCIAL

## 2024-04-26 ENCOUNTER — APPOINTMENT (OUTPATIENT)
Dept: GENERAL RADIOLOGY | Age: 44
End: 2024-04-26
Attending: UROLOGY
Payer: COMMERCIAL

## 2024-04-26 ENCOUNTER — TELEPHONE (OUTPATIENT)
Dept: UROLOGY | Age: 44
End: 2024-04-26

## 2024-04-26 ENCOUNTER — ANESTHESIA EVENT (OUTPATIENT)
Dept: OPERATING ROOM | Age: 44
End: 2024-04-26
Payer: COMMERCIAL

## 2024-04-26 ENCOUNTER — HOSPITAL ENCOUNTER (OUTPATIENT)
Age: 44
Setting detail: OUTPATIENT SURGERY
Discharge: HOME | End: 2024-04-26
Attending: UROLOGY | Admitting: UROLOGY
Payer: COMMERCIAL

## 2024-04-26 VITALS
DIASTOLIC BLOOD PRESSURE: 87 MMHG | HEART RATE: 82 BPM | TEMPERATURE: 96.8 F | OXYGEN SATURATION: 100 % | RESPIRATION RATE: 18 BRPM | SYSTOLIC BLOOD PRESSURE: 124 MMHG | HEIGHT: 61 IN | WEIGHT: 175 LBS | BODY MASS INDEX: 33.04 KG/M2

## 2024-04-26 PROCEDURE — 2580000003 HC RX 258

## 2024-04-26 PROCEDURE — 6360000002 HC RX W HCPCS

## 2024-04-26 PROCEDURE — C1769 GUIDE WIRE: HCPCS | Performed by: UROLOGY

## 2024-04-26 PROCEDURE — C2617 STENT, NON-COR, TEM W/O DEL: HCPCS | Performed by: UROLOGY

## 2024-04-26 PROCEDURE — 3600000012 HC SURGERY LEVEL 2 ADDTL 15MIN: Performed by: UROLOGY

## 2024-04-26 PROCEDURE — 6360000004 HC RX CONTRAST MEDICATION: Performed by: UROLOGY

## 2024-04-26 PROCEDURE — 2500000003 HC RX 250 WO HCPCS

## 2024-04-26 PROCEDURE — 3600000002 HC SURGERY LEVEL 2 BASE: Performed by: UROLOGY

## 2024-04-26 PROCEDURE — 7100000010 HC PHASE II RECOVERY - FIRST 15 MIN: Performed by: UROLOGY

## 2024-04-26 PROCEDURE — C1758 CATHETER, URETERAL: HCPCS | Performed by: UROLOGY

## 2024-04-26 PROCEDURE — 2709999900 HC NON-CHARGEABLE SUPPLY: Performed by: UROLOGY

## 2024-04-26 PROCEDURE — 2580000003 HC RX 258: Performed by: UROLOGY

## 2024-04-26 PROCEDURE — 7100000011 HC PHASE II RECOVERY - ADDTL 15 MIN: Performed by: UROLOGY

## 2024-04-26 PROCEDURE — 3700000001 HC ADD 15 MINUTES (ANESTHESIA): Performed by: UROLOGY

## 2024-04-26 PROCEDURE — 6370000000 HC RX 637 (ALT 250 FOR IP): Performed by: ANESTHESIOLOGY

## 2024-04-26 PROCEDURE — 3700000000 HC ANESTHESIA ATTENDED CARE: Performed by: UROLOGY

## 2024-04-26 DEVICE — URETERAL STENT
Type: IMPLANTABLE DEVICE | Site: URETER | Status: FUNCTIONAL
Brand: POLARIS™ ULTRA

## 2024-04-26 RX ORDER — SODIUM CHLORIDE 0.9 % (FLUSH) 0.9 %
5-40 SYRINGE (ML) INJECTION PRN
Status: DISCONTINUED | OUTPATIENT
Start: 2024-04-26 | End: 2024-04-26 | Stop reason: HOSPADM

## 2024-04-26 RX ORDER — SODIUM CHLORIDE 9 MG/ML
INJECTION, SOLUTION INTRAVENOUS PRN
Status: DISCONTINUED | OUTPATIENT
Start: 2024-04-26 | End: 2024-04-26 | Stop reason: HOSPADM

## 2024-04-26 RX ORDER — MEPERIDINE HYDROCHLORIDE 50 MG/ML
12.5 INJECTION INTRAMUSCULAR; INTRAVENOUS; SUBCUTANEOUS EVERY 5 MIN PRN
Status: DISCONTINUED | OUTPATIENT
Start: 2024-04-26 | End: 2024-04-26 | Stop reason: HOSPADM

## 2024-04-26 RX ORDER — FENTANYL CITRATE 50 UG/ML
25 INJECTION, SOLUTION INTRAMUSCULAR; INTRAVENOUS EVERY 5 MIN PRN
Status: DISCONTINUED | OUTPATIENT
Start: 2024-04-26 | End: 2024-04-26 | Stop reason: HOSPADM

## 2024-04-26 RX ORDER — MIDAZOLAM HYDROCHLORIDE 2 MG/2ML
1 INJECTION, SOLUTION INTRAMUSCULAR; INTRAVENOUS EVERY 10 MIN PRN
Status: DISCONTINUED | OUTPATIENT
Start: 2024-04-26 | End: 2024-04-26 | Stop reason: HOSPADM

## 2024-04-26 RX ORDER — LIDOCAINE HYDROCHLORIDE 10 MG/ML
1 INJECTION, SOLUTION INFILTRATION; PERINEURAL
Status: DISCONTINUED | OUTPATIENT
Start: 2024-04-26 | End: 2024-04-26 | Stop reason: HOSPADM

## 2024-04-26 RX ORDER — FENTANYL CITRATE 50 UG/ML
INJECTION, SOLUTION INTRAMUSCULAR; INTRAVENOUS PRN
Status: DISCONTINUED | OUTPATIENT
Start: 2024-04-26 | End: 2024-04-26 | Stop reason: SDUPTHER

## 2024-04-26 RX ORDER — ONDANSETRON 2 MG/ML
4 INJECTION INTRAMUSCULAR; INTRAVENOUS
Status: DISCONTINUED | OUTPATIENT
Start: 2024-04-26 | End: 2024-04-26 | Stop reason: HOSPADM

## 2024-04-26 RX ORDER — LIDOCAINE HYDROCHLORIDE 10 MG/ML
INJECTION, SOLUTION EPIDURAL; INFILTRATION; INTRACAUDAL; PERINEURAL PRN
Status: DISCONTINUED | OUTPATIENT
Start: 2024-04-26 | End: 2024-04-26 | Stop reason: SDUPTHER

## 2024-04-26 RX ORDER — SODIUM CHLORIDE, SODIUM LACTATE, POTASSIUM CHLORIDE, CALCIUM CHLORIDE 600; 310; 30; 20 MG/100ML; MG/100ML; MG/100ML; MG/100ML
INJECTION, SOLUTION INTRAVENOUS CONTINUOUS PRN
Status: DISCONTINUED | OUTPATIENT
Start: 2024-04-26 | End: 2024-04-26 | Stop reason: SDUPTHER

## 2024-04-26 RX ORDER — PROPOFOL 10 MG/ML
INJECTION, EMULSION INTRAVENOUS CONTINUOUS PRN
Status: DISCONTINUED | OUTPATIENT
Start: 2024-04-26 | End: 2024-04-26 | Stop reason: SDUPTHER

## 2024-04-26 RX ORDER — MIDAZOLAM HYDROCHLORIDE 1 MG/ML
INJECTION INTRAMUSCULAR; INTRAVENOUS PRN
Status: DISCONTINUED | OUTPATIENT
Start: 2024-04-26 | End: 2024-04-26 | Stop reason: SDUPTHER

## 2024-04-26 RX ORDER — SODIUM CHLORIDE 0.9 % (FLUSH) 0.9 %
5-40 SYRINGE (ML) INJECTION EVERY 12 HOURS SCHEDULED
Status: DISCONTINUED | OUTPATIENT
Start: 2024-04-26 | End: 2024-04-26 | Stop reason: HOSPADM

## 2024-04-26 RX ORDER — NALOXONE HYDROCHLORIDE 0.4 MG/ML
INJECTION, SOLUTION INTRAMUSCULAR; INTRAVENOUS; SUBCUTANEOUS PRN
Status: DISCONTINUED | OUTPATIENT
Start: 2024-04-26 | End: 2024-04-26 | Stop reason: HOSPADM

## 2024-04-26 RX ORDER — HYDROCODONE BITARTRATE AND ACETAMINOPHEN 5; 325 MG/1; MG/1
1 TABLET ORAL ONCE
Status: COMPLETED | OUTPATIENT
Start: 2024-04-26 | End: 2024-04-26

## 2024-04-26 RX ORDER — PROPOFOL 10 MG/ML
INJECTION, EMULSION INTRAVENOUS PRN
Status: DISCONTINUED | OUTPATIENT
Start: 2024-04-26 | End: 2024-04-26 | Stop reason: SDUPTHER

## 2024-04-26 RX ADMIN — FENTANYL CITRATE 25 MCG: 50 INJECTION, SOLUTION INTRAMUSCULAR; INTRAVENOUS at 10:20

## 2024-04-26 RX ADMIN — FENTANYL CITRATE 25 MCG: 50 INJECTION, SOLUTION INTRAMUSCULAR; INTRAVENOUS at 10:23

## 2024-04-26 RX ADMIN — FENTANYL CITRATE 50 MCG: 50 INJECTION, SOLUTION INTRAMUSCULAR; INTRAVENOUS at 10:05

## 2024-04-26 RX ADMIN — PROPOFOL 50 MG: 10 INJECTION, EMULSION INTRAVENOUS at 09:50

## 2024-04-26 RX ADMIN — SODIUM CHLORIDE, POTASSIUM CHLORIDE, SODIUM LACTATE AND CALCIUM CHLORIDE: 600; 310; 30; 20 INJECTION, SOLUTION INTRAVENOUS at 09:42

## 2024-04-26 RX ADMIN — PROPOFOL 50 MG: 10 INJECTION, EMULSION INTRAVENOUS at 10:05

## 2024-04-26 RX ADMIN — Medication 2000 MG: at 10:01

## 2024-04-26 RX ADMIN — LIDOCAINE HYDROCHLORIDE 50 MG: 10 INJECTION, SOLUTION EPIDURAL; INFILTRATION; INTRACAUDAL; PERINEURAL at 09:50

## 2024-04-26 RX ADMIN — PROPOFOL 150 MCG/KG/MIN: 10 INJECTION, EMULSION INTRAVENOUS at 09:50

## 2024-04-26 RX ADMIN — MIDAZOLAM 2 MG: 1 INJECTION INTRAMUSCULAR; INTRAVENOUS at 09:44

## 2024-04-26 RX ADMIN — HYDROCODONE BITARTRATE AND ACETAMINOPHEN 1 TABLET: 5; 325 TABLET ORAL at 11:04

## 2024-04-26 ASSESSMENT — PAIN - FUNCTIONAL ASSESSMENT: PAIN_FUNCTIONAL_ASSESSMENT: 0-10

## 2024-04-26 NOTE — OP NOTE
Operative Note      Patient: Sylvia Weller  YOB: 1980  MRN: [id number]    Date of Procedure: 4/26/2024    Preop diagnosis:  Right ureteral injury  Right ureteral stricture-distal    Post-Op Diagnosis: Same       Procedure:  Cystoscopy  Right ureteroscopy  Right retrograde pyelogram  Right stent exchange    Surgeon(s):  Gokul Hebert MD    Assistant:   Resident: Maria D Nuno MD    Anesthesia: Monitor Anesthesia Care    Estimated Blood Loss (mL): Minimal    Complications: None    Specimens:   * No specimens in log *    Implants:  Implant Name Type Inv. Item Serial No.  Lot No. LRB No. Used Action   STENT URET 6FR L26CM PERCFLX HYDR+ DBL PGTL THRD 2 - [id number]  STENT URET 6FR L26CM PERCFLX HYDR+ DBL PGTL THRD 2  [employer] [facility] UROLOGY- [id number] Right 1 Implanted         Drains: * No LDAs found *    Findings:  Persistent distal right ureteral stricture/poor ureteral healing around two thirds of the way down the ureter distal to the illiacs, proximal to the hiatus of bladder  Delayed excretion of contrast from the right ureter  Successful stent placement    Detailed Description of Procedure:   Patient was brought back to the operating room anesthesia was induced and patient was prepped and draped in sterile fashion in dorsolithotomy.  EPC cuffs were on and fully functional and patient was given antibiotics.  Timeout was performed a 22 Italian cystoscope was entered into the bladder and the stent was visualized.  Using a stent grasper the stent was brought up to the urethral meatus.  A Glidewire was then inserted through the stent and visualized in the right renal pelvis under fluoroscopy.  The stent was removed and a dual-lumen was placed over the wire up to the ureteral orifice.  A retrograde pyelogram was performed.  This showed persistent mild hydroureter above the area of the stricture with the area of the stricture in the distal ureter over the area of the iliacs approximately 2 cm in  appeared to be delayed contrast excretion from the collecting system.  We then withdrew the dual-lumen and went alongside the wire with a rigid ureteroscope.  The area was visualized and it appeared to be a 2 cm area of stricture that did not appear to be healing very well.  Tissue did not appear very healthy.  There are also flaps of ureteral tissue causing some obstruction.  We were able to pass the area with the ureteroscope.  Distal and proximal to the stricture areas appeared to be normal.  We then remove the ureteroscope and placed a 6 x 26 double-J ureteral stent with good curl was visualized in the renal pelvis and in the bladder.  The bladder was drained and the scope was removed.  The stent was left without strings per patient preference.  Attending physician was present throughout the procedure.  Patient tolerated procedure and was sent to PACU.      URS from this procedure^      Same area of stricture as this picture from previous URS ^    Plan:  Patient should follow-up with Dr. Cote in office for removal of the stent next week or so.  Then 6 weeks later would recommend a Lasix renogram to check for function and drainage.  If the patient has persistent delayed drainage may require or benefit from a ureteral reimplant    Electronically signed by Maria D Nuno MD on 4/26/2024 at 10:27 AM

## 2024-04-26 NOTE — H&P
Urology History and Physical    Patient:  Sylvia Weller  MRN: [id number]  YOB: 1980      HISTORY OF PRESENT ILLNESS:   The patient is a 43 y.o. female presenting today for cystoscopy, RIGHT retrograde pyelogram and ureteral stent removal. Patient was found to have distal ureteral stricture on 4/12/2024 retrograde pyelogram and ureteroscopy. She underwent ureteral balloon dilation.     Past Medical History:    Past Medical History:   Diagnosis Date    Anxiety     Arthritis     Knee-left    Depression     Dysmenorrhea     hysterectomy feb 2024    History of blood transfusion     feb 21, 2024    History of bronchitis     Migraine     Muscle spasm     Ovarian cyst     Personal history of other medical treatment     Patient wears oral braces    Under care of service provider     PCP: Beverley Lala PA-C, last visit 8/2023    Under care of service provider     Podiatry: Dallas Mabry DPM, [place], last visit 1/2024    Under care of service provider 04/24/2024    OBGYN: -Ricardo, last visit april 2024    Under care of service provider 04/24/2024    urology clinic-last visit april 2024    Uterine fibroid     hysterectomy feb 2024       Past Surgical History:    Past Surgical History:   Procedure Laterality Date    CYSTOSCOPY Right 4/12/2024    CYSTOSCOPY, RETROGRADE PYELOGRAM, STENT EXCHANGE, BALLOON DILATION,URETEROSCOPY performed by Urban Cote Jr., MD at [facility] OR    CYSTOSCOPY W/ URETERAL STENT PLACEMENT Right 04/12/2024    CYSTOSCOPY, RETROGRADE PYELOGRAM, STENT EXCHANGE, BALLOON DILATION,URETEROSCOPY (Right)    HYSTERECTOMY (CERVIX STATUS UNKNOWN) N/A 02/14/2024    XI ROBOTIC LAPAROSCOPIC HYSTERECTOMY, BILATERAL SALPINGECTOMY, LYSIS OF ADHESION performed by Manuel Colon MD at [facility] OR    IR NEPHROSTOMY PERCUTANEOUS RIGHT  02/16/2024    IR NEPHROSTOMY PERCUTANEOUS RIGHT 2/16/2024 Onur Ferreira MD [facility] SPECIAL PROCEDURES    LAPAROSCOPIC HYSTERECTOMY Bilateral

## 2024-04-26 NOTE — ANESTHESIA PRE PROCEDURE
of bronchitis    • Migraine    • Muscle spasm    • Ovarian cyst    • Personal history of other medical treatment     Patient wears oral braces   • Under care of service provider     PCP: Beverley Lala PA-C, last visit 8/2023   • Under care of service provider     Podiatry: Dallas Mabry DPM, [place], last visit 1/2024   • Under care of service provider 04/24/2024    OBGYN: -Ricardo, last visit april 2024   • Under care of service provider 04/24/2024    urology clinic-last visit april 2024   • Uterine fibroid     hysterectomy feb 2024       Past Surgical History:        Procedure Laterality Date   • CYSTOSCOPY Right 4/12/2024    CYSTOSCOPY, RETROGRADE PYELOGRAM, STENT EXCHANGE, BALLOON DILATION,URETEROSCOPY performed by Urban Cote Jr., MD at [facility] OR   • CYSTOSCOPY W/ URETERAL STENT PLACEMENT Right 04/12/2024    CYSTOSCOPY, RETROGRADE PYELOGRAM, STENT EXCHANGE, BALLOON DILATION,URETEROSCOPY (Right)   • HYSTERECTOMY (CERVIX STATUS UNKNOWN) N/A 02/14/2024    XI ROBOTIC LAPAROSCOPIC HYSTERECTOMY, BILATERAL SALPINGECTOMY, LYSIS OF ADHESION performed by Manuel Colon MD at [facility] OR   • IR NEPHROSTOMY PERCUTANEOUS RIGHT  02/16/2024    IR NEPHROSTOMY PERCUTANEOUS RIGHT 2/16/2024 Onur Ferreira MD [facility] SPECIAL PROCEDURES   • LAPAROSCOPIC HYSTERECTOMY Bilateral 02/14/2024    XI ROBOTIC LAPAROSCOPIC HYSTERECTOMY, BILATERAL SALPINGECTOMY, LYSIS OF ADHESION       Social History:    Social History     Tobacco Use   • Smoking status: Some Days     Current packs/day: 0.10     Average packs/day: 0.1 packs/day for 30.0 years (3.0 ttl pk-yrs)     Types: Cigarettes     Passive exposure: Current   • Smokeless tobacco: Never   Substance Use Topics   • Alcohol use: Yes     Comment: weekly                                Ready to quit: Not Answered  Counseling given: Not Answered      Vital Signs (Current):   Vitals:    04/24/24 1016   Weight: 79.4 kg (175 lb)   Height: 1.549 m (5' 1\")

## 2024-04-26 NOTE — TELEPHONE ENCOUNTER
Patient is requesting a call from Susan regarding extending her leave and can be reached at [phone] . Okay to leave a message.

## 2024-04-26 NOTE — ANESTHESIA POSTPROCEDURE EVALUATION
Department of Anesthesiology  Postprocedure Note    Patient: Sylvia Weller  MRN: [id number]  YOB: 1980  Date of evaluation: 4/26/2024    Procedure Summary       Date: 04/26/24 Room / Location: [address]    Anesthesia Start: 0944 Anesthesia Stop: 1033    Procedure: CYSTOSCOPY RETROGRADE PYELOGRAM, URETEROSCOPY, STENT EXCHANGE (Right) Diagnosis:       Injury of right ureter, initial encounter      (Injury of right ureter, initial encounter [S37.10XA])    Surgeons: Gokul Hebert MD Responsible Provider: Carolyn Vail MD    Anesthesia Type: MAC ASA Status: 2            Anesthesia Type: No value filed.    Nam Phase I: Nam Score: 10    Nam Phase II: Nam Score: 5    Anesthesia Post Evaluation    Patient location during evaluation: PACU  Patient participation: complete - patient participated  Level of consciousness: awake and alert  Pain score: 2  Airway patency: patent  Nausea & Vomiting: no vomiting and no nausea  Cardiovascular status: hemodynamically stable  Respiratory status: acceptable  Hydration status: stable  Pain management: adequate    No notable events documented.

## 2024-04-29 ENCOUNTER — TELEPHONE (OUTPATIENT)
Dept: UROLOGY | Age: 44
End: 2024-04-29

## 2024-04-29 NOTE — TELEPHONE ENCOUNTER
Patient is scheduled for surgery o 5/10 at 12:30 PM.  Patient came into office today for paperwork.  Gave her the date, time and instructions along with a letter for work.  Patient confirmed and verbalized understanding.

## 2024-05-09 NOTE — H&P
Pre-op History and Physical      Patient:  Sylvia Weller  MRN: [id number]  YOB: 1980    HISTORY OF PRESENT ILLNESS:     The patient is a 43 y.o. female who presents with history of distal ureteral stricture on 4/12/2024 retrograde pyelogram and ureteroscopy. She underwent ureteral balloon dilation and on re-evaluation in OR, she was found to have persistent stricture, so stent was replaced on 4/26/24. Here for cystoscopy, stent removal RIGHT.    Patient's old records, notes and chart reviewed and summarized above.     I independently reviewed the images and verified the radiology reports from:    [facility]    Result Date: 4/26/2024  Radiology exam is complete. No Radiologist dictation. Please follow up with ordering provider.         Past Medical History:    Past Medical History:   Diagnosis Date    Anxiety     Arthritis     Knee-left    Depression     Dysmenorrhea     hysterectomy feb 2024    History of blood transfusion     feb 21, 2024    History of bronchitis     Hydronephrosis 02/16/2024    Migraine     Muscle spasm     Ovarian cyst     Personal history of other medical treatment     Patient wears oral braces    Under care of service provider     PCP: Beverley Lala PA-C, last visit 8/2023    Under care of service provider     Podiatry: Dallas Mabry DPM, [place], last visit 1/2024    Under care of service provider 04/24/2024    OBGYN: -Ricardo, last visit april 2024    Under care of service provider 04/24/2024    urology clinic-last visit april 2024    Uterine fibroid     hysterectomy feb 2024       Past Surgical History:    Past Surgical History:   Procedure Laterality Date    CYSTOSCOPY Right 04/12/2024    CYSTOSCOPY, RETROGRADE PYELOGRAM, STENT EXCHANGE, BALLOON DILATION,URETEROSCOPY performed by Urban Cote Jr., MD at [facility] OR    CYSTOSCOPY  04/26/2024    CYSTOSCOPY W/ URETERAL STENT PLACEMENT Right 04/12/2024    CYSTOSCOPY, RETROGRADE  file       Family History:    Family History   Problem Relation Age of Onset    Other Mother         possible ovarian Ca 8/03/23 pending pending bx    Hypertension Mother     COPD Mother         smoker    Arthritis Mother     Cirrhosis Father     Alcohol Abuse Father     Other Sister         blood disorder, low RBC ct    Asthma Brother     Inflam Bowel Dis Brother     No Known Problems Brother     No Known Problems Brother     Heart Disease Maternal Grandmother     Heart Failure Maternal Grandmother     Cancer Maternal Grandfather     No Known Problems Paternal Grandmother     No Known Problems Paternal Grandfather        REVIEW OF SYSTEMS:  Constitutional: negative  Eyes: negative  Respiratory: negative  Cardiovascular: negative  Gastrointestinal: negative  Genitourinary: no acute issues  Musculoskeletal: negative  Skin: negative   Neurological: negative  Hematological/Lymphatic: negative  Psychological: negative    PHYSICAL EXAM:    No data found.  Constitutional: Patient in NAD  Neuro: Alert and oriented to person, place, and time  Psych: Mood and affect normal  Skin: Clean, dry, intact   Lungs: Respiratory effort normal, CTA  Cardiovascular:  Normal peripheral pulses; no murmur. Normal rhythm  Abdomen: Soft, non-tender, non-distended, no hepatosplenomegaly or hernia, CVA tenderness none  Bladder: Non-tender and non-disdended   : Non-tender, skin intact, no lesions       LABS:   No results for input(s): \"WBC\", \"HGB\", \"HCT\", \"MCV\", \"PLT\" in the last 72 hours.  No results for input(s): \"NA\", \"K\", \"CL\", \"CO2\", \"PHOS\", \"BUN\", \"CREATININE\" in the last 72 hours.    Invalid input(s): \"CA\"  No results found for: \"PSA\"      Urinalysis: No results for input(s): \"COLORU\", \"PHUR\", \"LABCAST\", \"WBCUA\", \"RBCUA\", \"MUCUS\", \"TRICHOMONAS\", \"YEAST\", \"BACTERIA\", \"CLARITYU\", \"SPECGRAV\", \"LEUKOCYTESUR\", \"UROBILINOGEN\", \"BILIRUBINUR\", \"BLOODU\" in the last 72 hours.    Invalid input(s): \"NITRATE\", \"GLUCOSEUKETONESUAMORPHOUS\"

## 2024-05-10 ENCOUNTER — ANESTHESIA EVENT (OUTPATIENT)
Dept: OPERATING ROOM | Age: 44
End: 2024-05-10
Payer: COMMERCIAL

## 2024-05-10 ENCOUNTER — APPOINTMENT (OUTPATIENT)
Dept: GENERAL RADIOLOGY | Age: 44
DRG: 699 | End: 2024-05-10
Attending: UROLOGY
Payer: COMMERCIAL

## 2024-05-10 ENCOUNTER — HOSPITAL ENCOUNTER (OUTPATIENT)
Age: 44
Setting detail: OUTPATIENT SURGERY
Discharge: HOME | DRG: 699 | End: 2024-05-10
Attending: UROLOGY | Admitting: UROLOGY
Payer: COMMERCIAL

## 2024-05-10 ENCOUNTER — ANESTHESIA (OUTPATIENT)
Dept: OPERATING ROOM | Age: 44
End: 2024-05-10
Payer: COMMERCIAL

## 2024-05-10 VITALS
TEMPERATURE: 97.9 F | SYSTOLIC BLOOD PRESSURE: 147 MMHG | RESPIRATION RATE: 14 BRPM | HEART RATE: 74 BPM | OXYGEN SATURATION: 99 % | DIASTOLIC BLOOD PRESSURE: 96 MMHG

## 2024-05-10 DIAGNOSIS — N13.30 HYDRONEPHROSIS, UNSPECIFIED HYDRONEPHROSIS TYPE: Primary | ICD-10-CM

## 2024-05-10 PROCEDURE — 2580000003 HC RX 258: Performed by: UROLOGY

## 2024-05-10 PROCEDURE — 6360000002 HC RX W HCPCS

## 2024-05-10 PROCEDURE — 7100000000 HC PACU RECOVERY - FIRST 15 MIN: Performed by: UROLOGY

## 2024-05-10 PROCEDURE — 0TP98DZ REMOVAL OF INTRALUMINAL DEVICE FROM URETER, VIA NATURAL OR ARTIFICIAL OPENING ENDOSCOPIC: ICD-10-PCS | Performed by: STUDENT IN AN ORGANIZED HEALTH CARE EDUCATION/TRAINING PROGRAM

## 2024-05-10 PROCEDURE — 2580000003 HC RX 258

## 2024-05-10 PROCEDURE — 2500000003 HC RX 250 WO HCPCS

## 2024-05-10 PROCEDURE — 3700000001 HC ADD 15 MINUTES (ANESTHESIA): Performed by: UROLOGY

## 2024-05-10 PROCEDURE — 3600000013 HC SURGERY LEVEL 3 ADDTL 15MIN: Performed by: UROLOGY

## 2024-05-10 PROCEDURE — 3600000003 HC SURGERY LEVEL 3 BASE: Performed by: UROLOGY

## 2024-05-10 PROCEDURE — 99285 EMERGENCY DEPT VISIT HI MDM: CPT

## 2024-05-10 PROCEDURE — 2709999900 HC NON-CHARGEABLE SUPPLY: Performed by: UROLOGY

## 2024-05-10 PROCEDURE — 3700000000 HC ANESTHESIA ATTENDED CARE: Performed by: UROLOGY

## 2024-05-10 PROCEDURE — 7100000011 HC PHASE II RECOVERY - ADDTL 15 MIN: Performed by: UROLOGY

## 2024-05-10 PROCEDURE — BT1D1ZZ FLUOROSCOPY OF RIGHT KIDNEY, URETER AND BLADDER USING LOW OSMOLAR CONTRAST: ICD-10-PCS | Performed by: STUDENT IN AN ORGANIZED HEALTH CARE EDUCATION/TRAINING PROGRAM

## 2024-05-10 PROCEDURE — 7100000001 HC PACU RECOVERY - ADDTL 15 MIN: Performed by: UROLOGY

## 2024-05-10 PROCEDURE — 6360000002 HC RX W HCPCS: Performed by: ANESTHESIOLOGY

## 2024-05-10 PROCEDURE — 7100000010 HC PHASE II RECOVERY - FIRST 15 MIN: Performed by: UROLOGY

## 2024-05-10 PROCEDURE — C1758 CATHETER, URETERAL: HCPCS | Performed by: UROLOGY

## 2024-05-10 RX ORDER — FENTANYL CITRATE 50 UG/ML
25 INJECTION, SOLUTION INTRAMUSCULAR; INTRAVENOUS EVERY 5 MIN PRN
Status: COMPLETED | OUTPATIENT
Start: 2024-05-10 | End: 2024-05-10

## 2024-05-10 RX ORDER — KETOROLAC TROMETHAMINE 10 MG/1
10 TABLET, FILM COATED ORAL EVERY 6 HOURS PRN
Qty: 20 TABLET | Refills: 0 | Status: ON HOLD | OUTPATIENT
Start: 2024-05-10 | End: 2025-05-10

## 2024-05-10 RX ORDER — OXYCODONE HYDROCHLORIDE AND ACETAMINOPHEN 5; 325 MG/1; MG/1
1 TABLET ORAL ONCE
Status: CANCELLED | OUTPATIENT
Start: 2024-05-10

## 2024-05-10 RX ORDER — OXYCODONE HYDROCHLORIDE AND ACETAMINOPHEN 5; 325 MG/1; MG/1
1 TABLET ORAL ONCE
Status: DISCONTINUED | OUTPATIENT
Start: 2024-05-10 | End: 2024-05-10 | Stop reason: HOSPADM

## 2024-05-10 RX ORDER — ONDANSETRON 2 MG/ML
4 INJECTION INTRAMUSCULAR; INTRAVENOUS
Status: DISCONTINUED | OUTPATIENT
Start: 2024-05-10 | End: 2024-05-10 | Stop reason: HOSPADM

## 2024-05-10 RX ORDER — LIDOCAINE HYDROCHLORIDE 10 MG/ML
INJECTION, SOLUTION EPIDURAL; INFILTRATION; INTRACAUDAL; PERINEURAL PRN
Status: DISCONTINUED | OUTPATIENT
Start: 2024-05-10 | End: 2024-05-10 | Stop reason: SDUPTHER

## 2024-05-10 RX ORDER — LABETALOL HYDROCHLORIDE 5 MG/ML
10 INJECTION, SOLUTION INTRAVENOUS
Status: DISCONTINUED | OUTPATIENT
Start: 2024-05-10 | End: 2024-05-10 | Stop reason: HOSPADM

## 2024-05-10 RX ORDER — KETOROLAC TROMETHAMINE 30 MG/ML
30 INJECTION, SOLUTION INTRAMUSCULAR; INTRAVENOUS ONCE
Status: DISCONTINUED | OUTPATIENT
Start: 2024-05-10 | End: 2024-05-10 | Stop reason: HOSPADM

## 2024-05-10 RX ORDER — OXYCODONE HYDROCHLORIDE AND ACETAMINOPHEN 5; 325 MG/1; MG/1
1 TABLET ORAL EVERY 6 HOURS PRN
Qty: 10 TABLET | Refills: 0 | Status: ON HOLD | OUTPATIENT
Start: 2024-05-10 | End: 2024-05-13

## 2024-05-10 RX ORDER — METOCLOPRAMIDE HYDROCHLORIDE 5 MG/ML
10 INJECTION INTRAMUSCULAR; INTRAVENOUS
Status: DISCONTINUED | OUTPATIENT
Start: 2024-05-10 | End: 2024-05-10 | Stop reason: HOSPADM

## 2024-05-10 RX ORDER — TAMSULOSIN HYDROCHLORIDE 0.4 MG/1
0.4 CAPSULE ORAL DAILY
Qty: 30 CAPSULE | Refills: 0 | Status: ON HOLD | OUTPATIENT
Start: 2024-05-10

## 2024-05-10 RX ORDER — NALOXONE HYDROCHLORIDE 0.4 MG/ML
INJECTION, SOLUTION INTRAMUSCULAR; INTRAVENOUS; SUBCUTANEOUS PRN
Status: DISCONTINUED | OUTPATIENT
Start: 2024-05-10 | End: 2024-05-10 | Stop reason: HOSPADM

## 2024-05-10 RX ORDER — SODIUM CHLORIDE 0.9 % (FLUSH) 0.9 %
5-40 SYRINGE (ML) INJECTION EVERY 12 HOURS SCHEDULED
Status: DISCONTINUED | OUTPATIENT
Start: 2024-05-10 | End: 2024-05-10 | Stop reason: HOSPADM

## 2024-05-10 RX ORDER — SODIUM CHLORIDE 0.9 % (FLUSH) 0.9 %
5-40 SYRINGE (ML) INJECTION PRN
Status: DISCONTINUED | OUTPATIENT
Start: 2024-05-10 | End: 2024-05-10 | Stop reason: HOSPADM

## 2024-05-10 RX ORDER — KETAMINE HCL IN NACL, ISO-OSM 100MG/10ML
SYRINGE (ML) INJECTION PRN
Status: DISCONTINUED | OUTPATIENT
Start: 2024-05-10 | End: 2024-05-10 | Stop reason: SDUPTHER

## 2024-05-10 RX ORDER — SODIUM CHLORIDE, SODIUM LACTATE, POTASSIUM CHLORIDE, CALCIUM CHLORIDE 600; 310; 30; 20 MG/100ML; MG/100ML; MG/100ML; MG/100ML
INJECTION, SOLUTION INTRAVENOUS CONTINUOUS PRN
Status: DISCONTINUED | OUTPATIENT
Start: 2024-05-10 | End: 2024-05-10 | Stop reason: SDUPTHER

## 2024-05-10 RX ORDER — SODIUM CHLORIDE 9 MG/ML
INJECTION, SOLUTION INTRAVENOUS PRN
Status: DISCONTINUED | OUTPATIENT
Start: 2024-05-10 | End: 2024-05-10 | Stop reason: HOSPADM

## 2024-05-10 RX ORDER — PROPOFOL 10 MG/ML
INJECTION, EMULSION INTRAVENOUS CONTINUOUS PRN
Status: DISCONTINUED | OUTPATIENT
Start: 2024-05-10 | End: 2024-05-10 | Stop reason: SDUPTHER

## 2024-05-10 RX ORDER — GLYCOPYRROLATE 0.2 MG/ML
INJECTION INTRAMUSCULAR; INTRAVENOUS PRN
Status: DISCONTINUED | OUTPATIENT
Start: 2024-05-10 | End: 2024-05-10 | Stop reason: SDUPTHER

## 2024-05-10 RX ORDER — FENTANYL CITRATE 50 UG/ML
INJECTION, SOLUTION INTRAMUSCULAR; INTRAVENOUS PRN
Status: DISCONTINUED | OUTPATIENT
Start: 2024-05-10 | End: 2024-05-10 | Stop reason: SDUPTHER

## 2024-05-10 RX ORDER — MIDAZOLAM HYDROCHLORIDE 2 MG/2ML
2 INJECTION, SOLUTION INTRAMUSCULAR; INTRAVENOUS ONCE
Status: DISCONTINUED | OUTPATIENT
Start: 2024-05-10 | End: 2024-05-10 | Stop reason: HOSPADM

## 2024-05-10 RX ORDER — CEFAZOLIN SODIUM 1 G/3ML
INJECTION, POWDER, FOR SOLUTION INTRAMUSCULAR; INTRAVENOUS PRN
Status: DISCONTINUED | OUTPATIENT
Start: 2024-05-10 | End: 2024-05-10 | Stop reason: SDUPTHER

## 2024-05-10 RX ADMIN — PROPOFOL 150 MCG/KG/MIN: 10 INJECTION, EMULSION INTRAVENOUS at 12:13

## 2024-05-10 RX ADMIN — GLYCOPYRROLATE 0.1 MG: 0.2 INJECTION INTRAMUSCULAR; INTRAVENOUS at 12:20

## 2024-05-10 RX ADMIN — Medication 20 MG: at 12:20

## 2024-05-10 RX ADMIN — Medication 10 MG: at 12:25

## 2024-05-10 RX ADMIN — FENTANYL CITRATE 50 MCG: 50 INJECTION, SOLUTION INTRAMUSCULAR; INTRAVENOUS at 12:28

## 2024-05-10 RX ADMIN — SODIUM CHLORIDE, POTASSIUM CHLORIDE, SODIUM LACTATE AND CALCIUM CHLORIDE: 600; 310; 30; 20 INJECTION, SOLUTION INTRAVENOUS at 12:09

## 2024-05-10 RX ADMIN — HYDROMORPHONE HYDROCHLORIDE 0.5 MG: 1 INJECTION, SOLUTION INTRAMUSCULAR; INTRAVENOUS; SUBCUTANEOUS at 13:28

## 2024-05-10 RX ADMIN — LIDOCAINE HYDROCHLORIDE 50 MG: 10 INJECTION, SOLUTION EPIDURAL; INFILTRATION; INTRACAUDAL; PERINEURAL at 12:13

## 2024-05-10 RX ADMIN — CEFAZOLIN 2 G: 1 INJECTION, POWDER, FOR SOLUTION INTRAMUSCULAR; INTRAVENOUS at 12:24

## 2024-05-10 RX ADMIN — FENTANYL CITRATE 25 MCG: 50 INJECTION, SOLUTION INTRAMUSCULAR; INTRAVENOUS at 12:17

## 2024-05-10 RX ADMIN — FENTANYL CITRATE 25 MCG: 50 INJECTION, SOLUTION INTRAMUSCULAR; INTRAVENOUS at 12:58

## 2024-05-10 RX ADMIN — FENTANYL CITRATE 25 MCG: 50 INJECTION, SOLUTION INTRAMUSCULAR; INTRAVENOUS at 12:47

## 2024-05-10 RX ADMIN — Medication 0.5 MG: at 13:28

## 2024-05-10 ASSESSMENT — PAIN DESCRIPTION - DESCRIPTORS
DESCRIPTORS: ACHING
DESCRIPTORS: DISCOMFORT;JABBING;SHARP
DESCRIPTORS: DISCOMFORT;JABBING;SHARP
DESCRIPTORS: ACHING;STABBING;DISCOMFORT
DESCRIPTORS: ACHING;DISCOMFORT
DESCRIPTORS: ACHING;DISCOMFORT
DESCRIPTORS: ACHING;DISCOMFORT;SHARP;JABBING

## 2024-05-10 ASSESSMENT — PAIN DESCRIPTION - LOCATION
LOCATION: ABDOMEN
LOCATION: FLANK
LOCATION: ABDOMEN

## 2024-05-10 ASSESSMENT — PAIN SCALES - GENERAL
PAINLEVEL_OUTOF10: 7
PAINLEVEL_OUTOF10: 10
PAINLEVEL_OUTOF10: 5
PAINLEVEL_OUTOF10: 7
PAINLEVEL_OUTOF10: 7
PAINLEVEL_OUTOF10: 6
PAINLEVEL_OUTOF10: 5
PAINLEVEL_OUTOF10: 7

## 2024-05-10 ASSESSMENT — PAIN DESCRIPTION - ORIENTATION
ORIENTATION: MID
ORIENTATION: RIGHT
ORIENTATION: MID
ORIENTATION: MID

## 2024-05-10 ASSESSMENT — PAIN - FUNCTIONAL ASSESSMENT
PAIN_FUNCTIONAL_ASSESSMENT: 0-10
PAIN_FUNCTIONAL_ASSESSMENT: 0-10

## 2024-05-10 NOTE — OP NOTE
Operative Note      Patient: Sylvia Weller  YOB: 1980  MRN: [id number]    Date of Procedure: 5/10/2024    Preop diagnosis  Ureteral injury history  Ureteral stricture    Post-Op Diagnosis: Same       Procedure:  Cystoscopy  Right retrograde pyelogram  Right stent removal    Surgeon(s):  Urban Cote Jr., MD    Assistant:   Chase Nuno MD PGY4      Anesthesia: Monitor Anesthesia Care    Estimated Blood Loss (mL): Minimal    Complications: None    Specimens:   * No specimens in log *    Implants:  * No implants in log *      Drains: * No LDAs found *    Findings:  Infection Present At Time Of Surgery (PATOS) (choose all levels that have infection present):  No infection present  Other Findings: Persistent narrowing at the area of the distal ureter distal to the iliacs around two thirds of the way down the ureter proximal to the hiatus of the bladder, hydronephrosis past the area of narrowing    Detailed Description of Procedure:   Patient was brought back to the operating room anesthesia was induced and patient was prepped and draped in sterile fashion dorsolithotomy.  EPC cuffs were on and functional and patient was given antibiotics.  Timeout was performed and a 22 Georgian cystoscope was entered into the bladder and the stent was visualized using a stent grasper the stent was brought out in entirety.  At this time a 5 Georgian cone-tip ureteral catheter was inserted into the ureteral orifice and a retrograde pyelogram was performed.  The contrast however did not opacify the area of the distal ureter.  We then continued to push the 5 Georgian ureteral catheter up the distal ureter.  However at the area of the stricture around two thirds of the way down the ureter distal to the iliacs proximal to the hiatus of the bladder some resistance was encountered however we were able to get past this and push the 5 Georgian ureteral catheter just past the area of the stricture at this time the contrast did opacify

## 2024-05-10 NOTE — FLOWSHEET NOTE
Dr Hathaway at bedside, spoke with pt about the plan. Pt reports pain has improved after the Toradol. The plan is for the pt to go home  with pain medications with instructions to return to the ER if increases again with no relief or if she is unable to urinate.

## 2024-05-10 NOTE — DISCHARGE INSTRUCTIONS
Pt ok to discharge home in good condition  No heavy lifting, >10 lbs for today  Pt should avoid strenuous activity for today  Pt should walk moderately at home  Pt ok to shower   Pt may resume diet as tolerated  Please call attending physician or hospital  with questions  Call or Present to ED if fever (> 101F), intractable nausea vomiting or pain.  Rx in chart     Pt should follow up with Dr. Cote, in appx 6 weeks, call to confirm appointment       No alcoholic beverages, no driving or operating machinery, no making important decisions for 24 hours.   You may have a normal diet but should eat lightly day of surgery.  Drink plenty of fluids.  Urinate within 8 hours after surgery, if unable to urinate call your doctorNo alcoholic beverages, no driving or operating machinery, no making important decisions for 24 hours.   You may have a normal diet but should eat lightly day of surgery.  Drink plenty of fluids.  Urinate within 8 hours after surgery, if unable to urinate call your doctor      Call your doctor for the following:   Chills   Temperature greater than 101   Pain that is not tolerable despite taking pain medicine as ordered   There is increased swelling, redness or warmth at surgical site   There is increased drainage or bleeding from surgical site   Do not remove surgical dressing unless instructed to do so by your surgeon           Call your doctor for the following:   Chills   Temperature greater than 101   Pain that is not tolerable despite taking pain medicine as ordered   There is increased swelling, redness or warmth at surgical site   There is increased drainage or bleeding from surgical site   Do not remove surgical dressing unless instructed to do so by your surgeon

## 2024-05-11 ENCOUNTER — HOSPITAL ENCOUNTER (INPATIENT)
Age: 44
LOS: 4 days | Discharge: HOME | DRG: 699 | End: 2024-05-15
Attending: EMERGENCY MEDICINE
Payer: COMMERCIAL

## 2024-05-11 ENCOUNTER — APPOINTMENT (OUTPATIENT)
Dept: ULTRASOUND IMAGING | Age: 44
DRG: 699 | End: 2024-05-11
Payer: COMMERCIAL

## 2024-05-11 DIAGNOSIS — R10.9 FLANK PAIN: ICD-10-CM

## 2024-05-11 DIAGNOSIS — A41.9 SEPTICEMIA (HCC): ICD-10-CM

## 2024-05-11 DIAGNOSIS — N13.30 HYDRONEPHROSIS OF RIGHT KIDNEY: ICD-10-CM

## 2024-05-11 DIAGNOSIS — N30.01 ACUTE CYSTITIS WITH HEMATURIA: Primary | ICD-10-CM

## 2024-05-11 PROBLEM — N17.9 AKI (ACUTE KIDNEY INJURY) (HCC): Status: ACTIVE | Noted: 2024-05-11

## 2024-05-11 PROBLEM — N39.0 UTI (URINARY TRACT INFECTION): Status: ACTIVE | Noted: 2024-05-11

## 2024-05-11 LAB
ALBUMIN SERPL-MCNC: 4.2 G/DL (ref 3.5–5.2)
ALBUMIN/GLOB SERPL: 1 {RATIO} (ref 1–2.5)
ALP SERPL-CCNC: 63 U/L (ref 35–104)
ALT SERPL-CCNC: 13 U/L (ref 10–35)
ANION GAP SERPL CALCULATED.3IONS-SCNC: 10 MMOL/L (ref 9–16)
ANION GAP SERPL CALCULATED.3IONS-SCNC: 10 MMOL/L (ref 9–16)
AST SERPL-CCNC: 24 U/L (ref 10–35)
BACTERIA URNS QL MICRO: ABNORMAL
BASOPHILS # BLD: 0.03 K/UL (ref 0–0.2)
BASOPHILS NFR BLD: 0 % (ref 0–2)
BILIRUB SERPL-MCNC: 0.3 MG/DL (ref 0–1.2)
BILIRUB UR QL STRIP: NEGATIVE
BUN SERPL-MCNC: 8 MG/DL (ref 6–20)
BUN SERPL-MCNC: 8 MG/DL (ref 6–20)
CALCIUM SERPL-MCNC: 8.5 MG/DL (ref 8.6–10.4)
CALCIUM SERPL-MCNC: 9 MG/DL (ref 8.6–10.4)
CASTS #/AREA URNS LPF: ABNORMAL /LPF (ref 0–8)
CHLORIDE SERPL-SCNC: 102 MMOL/L (ref 98–107)
CHLORIDE SERPL-SCNC: 105 MMOL/L (ref 98–107)
CLARITY UR: ABNORMAL
CO2 SERPL-SCNC: 22 MMOL/L (ref 20–31)
CO2 SERPL-SCNC: 27 MMOL/L (ref 20–31)
COLOR UR: YELLOW
CREAT SERPL-MCNC: 1.1 MG/DL (ref 0.5–0.9)
CREAT SERPL-MCNC: 1.2 MG/DL (ref 0.5–0.9)
CREAT UR-MCNC: 118 MG/DL (ref 28–217)
EOSINOPHIL # BLD: 0.11 K/UL (ref 0–0.44)
EOSINOPHILS RELATIVE PERCENT: 1 % (ref 1–4)
EPI CELLS #/AREA URNS HPF: ABNORMAL /HPF (ref 0–5)
ERYTHROCYTE [DISTWIDTH] IN BLOOD BY AUTOMATED COUNT: 17.7 % (ref 11.8–14.4)
ERYTHROCYTE [DISTWIDTH] IN BLOOD BY AUTOMATED COUNT: 18 % (ref 11.8–14.4)
GFR, ESTIMATED: 60 ML/MIN/1.73M2
GFR, ESTIMATED: 62 ML/MIN/1.73M2
GLUCOSE SERPL-MCNC: 116 MG/DL (ref 74–99)
GLUCOSE SERPL-MCNC: 98 MG/DL (ref 74–99)
GLUCOSE UR STRIP-MCNC: NEGATIVE MG/DL
HCT VFR BLD AUTO: 34.5 % (ref 36.3–47.1)
HCT VFR BLD AUTO: 37.3 % (ref 36.3–47.1)
HGB BLD-MCNC: 10.5 G/DL (ref 11.9–15.1)
HGB BLD-MCNC: 11.4 G/DL (ref 11.9–15.1)
HGB UR QL STRIP.AUTO: ABNORMAL
IMM GRANULOCYTES # BLD AUTO: 0.06 K/UL (ref 0–0.3)
IMM GRANULOCYTES NFR BLD: 0 %
KETONES UR STRIP-MCNC: NEGATIVE MG/DL
LACTIC ACID, SEPSIS WHOLE BLOOD: 0.6 MMOL/L (ref 0.5–1.9)
LACTIC ACID, SEPSIS WHOLE BLOOD: 1.1 MMOL/L (ref 0.5–1.9)
LEUKOCYTE ESTERASE UR QL STRIP: ABNORMAL
LYMPHOCYTES NFR BLD: 0.88 K/UL (ref 1.1–3.7)
LYMPHOCYTES RELATIVE PERCENT: 7 % (ref 24–43)
MCH RBC QN AUTO: 26.6 PG (ref 25.2–33.5)
MCH RBC QN AUTO: 27.3 PG (ref 25.2–33.5)
MCHC RBC AUTO-ENTMCNC: 30.4 G/DL (ref 28.4–34.8)
MCHC RBC AUTO-ENTMCNC: 30.6 G/DL (ref 28.4–34.8)
MCV RBC AUTO: 86.9 FL (ref 82.6–102.9)
MCV RBC AUTO: 89.8 FL (ref 82.6–102.9)
MONOCYTES NFR BLD: 1.07 K/UL (ref 0.1–1.2)
MONOCYTES NFR BLD: 8 % (ref 3–12)
NEUTROPHILS NFR BLD: 84 % (ref 36–65)
NEUTS SEG NFR BLD: 11.28 K/UL (ref 1.5–8.1)
NITRITE UR QL STRIP: NEGATIVE
NRBC BLD-RTO: 0 PER 100 WBC
NRBC BLD-RTO: 0 PER 100 WBC
PH UR STRIP: 8 [PH] (ref 5–8)
PLATELET # BLD AUTO: 379 K/UL (ref 138–453)
PLATELET # BLD AUTO: 391 K/UL (ref 138–453)
PMV BLD AUTO: 11.1 FL (ref 8.1–13.5)
PMV BLD AUTO: 11.9 FL (ref 8.1–13.5)
POTASSIUM SERPL-SCNC: 3.7 MMOL/L (ref 3.7–5.3)
POTASSIUM SERPL-SCNC: 3.9 MMOL/L (ref 3.7–5.3)
PROT SERPL-MCNC: 7.1 G/DL (ref 6.6–8.7)
PROT UR STRIP-MCNC: NEGATIVE MG/DL
RBC # BLD AUTO: 3.84 M/UL (ref 3.95–5.11)
RBC # BLD AUTO: 4.29 M/UL (ref 3.95–5.11)
RBC # BLD: ABNORMAL 10*6/UL
RBC #/AREA URNS HPF: ABNORMAL /HPF (ref 0–4)
SODIUM SERPL-SCNC: 137 MMOL/L (ref 136–145)
SODIUM SERPL-SCNC: 139 MMOL/L (ref 136–145)
SODIUM UR-SCNC: 193 MMOL/L
SP GR UR STRIP: 1.01 (ref 1–1.03)
UROBILINOGEN UR STRIP-ACNC: NORMAL EU/DL (ref 0–1)
WBC #/AREA URNS HPF: ABNORMAL /HPF (ref 0–5)
WBC OTHER # BLD: 12.7 K/UL (ref 3.5–11.3)
WBC OTHER # BLD: 13.4 K/UL (ref 3.5–11.3)

## 2024-05-11 PROCEDURE — 6360000002 HC RX W HCPCS: Performed by: STUDENT IN AN ORGANIZED HEALTH CARE EDUCATION/TRAINING PROGRAM

## 2024-05-11 PROCEDURE — 36415 COLL VENOUS BLD VENIPUNCTURE: CPT

## 2024-05-11 PROCEDURE — 84300 ASSAY OF URINE SODIUM: CPT

## 2024-05-11 PROCEDURE — 6370000000 HC RX 637 (ALT 250 FOR IP): Performed by: NURSE PRACTITIONER

## 2024-05-11 PROCEDURE — 99222 1ST HOSP IP/OBS MODERATE 55: CPT | Performed by: STUDENT IN AN ORGANIZED HEALTH CARE EDUCATION/TRAINING PROGRAM

## 2024-05-11 PROCEDURE — 81001 URINALYSIS AUTO W/SCOPE: CPT

## 2024-05-11 PROCEDURE — 87086 URINE CULTURE/COLONY COUNT: CPT

## 2024-05-11 PROCEDURE — 80048 BASIC METABOLIC PNL TOTAL CA: CPT

## 2024-05-11 PROCEDURE — 80053 COMPREHEN METABOLIC PANEL: CPT

## 2024-05-11 PROCEDURE — 87154 CUL TYP ID BLD PTHGN 6+ TRGT: CPT

## 2024-05-11 PROCEDURE — 85025 COMPLETE CBC W/AUTO DIFF WBC: CPT

## 2024-05-11 PROCEDURE — 87205 SMEAR GRAM STAIN: CPT

## 2024-05-11 PROCEDURE — 6360000002 HC RX W HCPCS: Performed by: NURSE PRACTITIONER

## 2024-05-11 PROCEDURE — 96374 THER/PROPH/DIAG INJ IV PUSH: CPT

## 2024-05-11 PROCEDURE — 2580000003 HC RX 258: Performed by: NURSE PRACTITIONER

## 2024-05-11 PROCEDURE — 1200000000 HC SEMI PRIVATE

## 2024-05-11 PROCEDURE — 2T015 HOSPITALIST 2ND TOUCH: CPT | Performed by: NURSE PRACTITIONER

## 2024-05-11 PROCEDURE — 96375 TX/PRO/DX INJ NEW DRUG ADDON: CPT

## 2024-05-11 PROCEDURE — 87040 BLOOD CULTURE FOR BACTERIA: CPT

## 2024-05-11 PROCEDURE — 83605 ASSAY OF LACTIC ACID: CPT

## 2024-05-11 PROCEDURE — 82570 ASSAY OF URINE CREATININE: CPT

## 2024-05-11 PROCEDURE — 2580000003 HC RX 258: Performed by: STUDENT IN AN ORGANIZED HEALTH CARE EDUCATION/TRAINING PROGRAM

## 2024-05-11 PROCEDURE — 85027 COMPLETE CBC AUTOMATED: CPT

## 2024-05-11 PROCEDURE — 76770 US EXAM ABDO BACK WALL COMP: CPT

## 2024-05-11 RX ORDER — MAGNESIUM SULFATE 1 G/100ML
1000 INJECTION INTRAVENOUS PRN
Status: DISCONTINUED | OUTPATIENT
Start: 2024-05-11 | End: 2024-05-15 | Stop reason: HOSPADM

## 2024-05-11 RX ORDER — KETOROLAC TROMETHAMINE 30 MG/ML
30 INJECTION, SOLUTION INTRAMUSCULAR; INTRAVENOUS ONCE
Status: COMPLETED | OUTPATIENT
Start: 2024-05-11 | End: 2024-05-11

## 2024-05-11 RX ORDER — HYDROCODONE BITARTRATE AND ACETAMINOPHEN 5; 325 MG/1; MG/1
2 TABLET ORAL EVERY 4 HOURS PRN
Status: DISCONTINUED | OUTPATIENT
Start: 2024-05-11 | End: 2024-05-15 | Stop reason: HOSPADM

## 2024-05-11 RX ORDER — ONDANSETRON 4 MG/1
4 TABLET, ORALLY DISINTEGRATING ORAL EVERY 8 HOURS PRN
Status: DISCONTINUED | OUTPATIENT
Start: 2024-05-11 | End: 2024-05-15 | Stop reason: HOSPADM

## 2024-05-11 RX ORDER — MORPHINE SULFATE 4 MG/ML
4 INJECTION, SOLUTION INTRAMUSCULAR; INTRAVENOUS ONCE
Status: COMPLETED | OUTPATIENT
Start: 2024-05-11 | End: 2024-05-11

## 2024-05-11 RX ORDER — ONDANSETRON 2 MG/ML
4 INJECTION INTRAMUSCULAR; INTRAVENOUS EVERY 6 HOURS PRN
Status: DISCONTINUED | OUTPATIENT
Start: 2024-05-11 | End: 2024-05-15 | Stop reason: HOSPADM

## 2024-05-11 RX ORDER — SODIUM CHLORIDE 0.9 % (FLUSH) 0.9 %
10 SYRINGE (ML) INJECTION PRN
Status: DISCONTINUED | OUTPATIENT
Start: 2024-05-11 | End: 2024-05-15 | Stop reason: HOSPADM

## 2024-05-11 RX ORDER — POLYETHYLENE GLYCOL 3350 17 G/17G
17 POWDER, FOR SOLUTION ORAL DAILY PRN
Status: DISCONTINUED | OUTPATIENT
Start: 2024-05-11 | End: 2024-05-15 | Stop reason: HOSPADM

## 2024-05-11 RX ORDER — SENNOSIDES A AND B 8.6 MG/1
1 TABLET, FILM COATED ORAL 2 TIMES DAILY PRN
Status: DISCONTINUED | OUTPATIENT
Start: 2024-05-11 | End: 2024-05-12

## 2024-05-11 RX ORDER — SODIUM CHLORIDE 9 MG/ML
INJECTION, SOLUTION INTRAVENOUS CONTINUOUS
Status: ACTIVE | OUTPATIENT
Start: 2024-05-11 | End: 2024-05-13

## 2024-05-11 RX ORDER — SODIUM CHLORIDE 9 MG/ML
INJECTION, SOLUTION INTRAVENOUS PRN
Status: DISCONTINUED | OUTPATIENT
Start: 2024-05-11 | End: 2024-05-15 | Stop reason: HOSPADM

## 2024-05-11 RX ORDER — ONDANSETRON 2 MG/ML
4 INJECTION INTRAMUSCULAR; INTRAVENOUS ONCE
Status: COMPLETED | OUTPATIENT
Start: 2024-05-11 | End: 2024-05-11

## 2024-05-11 RX ORDER — ACETAMINOPHEN 325 MG/1
650 TABLET ORAL EVERY 6 HOURS PRN
Status: DISCONTINUED | OUTPATIENT
Start: 2024-05-11 | End: 2024-05-15 | Stop reason: HOSPADM

## 2024-05-11 RX ORDER — SODIUM CHLORIDE 0.9 % (FLUSH) 0.9 %
5-40 SYRINGE (ML) INJECTION EVERY 12 HOURS SCHEDULED
Status: DISCONTINUED | OUTPATIENT
Start: 2024-05-11 | End: 2024-05-15 | Stop reason: HOSPADM

## 2024-05-11 RX ORDER — POTASSIUM CHLORIDE 20 MEQ/1
40 TABLET, EXTENDED RELEASE ORAL PRN
Status: DISCONTINUED | OUTPATIENT
Start: 2024-05-11 | End: 2024-05-15 | Stop reason: HOSPADM

## 2024-05-11 RX ORDER — POTASSIUM CHLORIDE 7.45 MG/ML
10 INJECTION INTRAVENOUS PRN
Status: DISCONTINUED | OUTPATIENT
Start: 2024-05-11 | End: 2024-05-15 | Stop reason: HOSPADM

## 2024-05-11 RX ORDER — MORPHINE SULFATE 2 MG/ML
2 INJECTION, SOLUTION INTRAMUSCULAR; INTRAVENOUS ONCE
Status: COMPLETED | OUTPATIENT
Start: 2024-05-11 | End: 2024-05-11

## 2024-05-11 RX ORDER — HYDROCODONE BITARTRATE AND ACETAMINOPHEN 5; 325 MG/1; MG/1
1 TABLET ORAL EVERY 4 HOURS PRN
Status: DISCONTINUED | OUTPATIENT
Start: 2024-05-11 | End: 2024-05-15 | Stop reason: HOSPADM

## 2024-05-11 RX ORDER — ENOXAPARIN SODIUM 100 MG/ML
40 INJECTION SUBCUTANEOUS DAILY
Status: DISCONTINUED | OUTPATIENT
Start: 2024-05-11 | End: 2024-05-15 | Stop reason: HOSPADM

## 2024-05-11 RX ORDER — ACETAMINOPHEN 650 MG/1
650 SUPPOSITORY RECTAL EVERY 6 HOURS PRN
Status: DISCONTINUED | OUTPATIENT
Start: 2024-05-11 | End: 2024-05-15 | Stop reason: HOSPADM

## 2024-05-11 RX ADMIN — POLYETHYLENE GLYCOL 3350 17 G: 17 POWDER, FOR SOLUTION ORAL at 18:01

## 2024-05-11 RX ADMIN — SODIUM CHLORIDE: 9 INJECTION, SOLUTION INTRAVENOUS at 16:29

## 2024-05-11 RX ADMIN — MORPHINE SULFATE 4 MG: 4 INJECTION INTRAVENOUS at 01:10

## 2024-05-11 RX ADMIN — KETOROLAC TROMETHAMINE 30 MG: 30 INJECTION, SOLUTION INTRAMUSCULAR; INTRAVENOUS at 03:04

## 2024-05-11 RX ADMIN — SENNOSIDES 8.6 MG: 8.6 TABLET, FILM COATED ORAL at 22:51

## 2024-05-11 RX ADMIN — HYDROCODONE BITARTRATE AND ACETAMINOPHEN 2 TABLET: 5; 325 TABLET ORAL at 10:20

## 2024-05-11 RX ADMIN — MORPHINE SULFATE 2 MG: 2 INJECTION, SOLUTION INTRAMUSCULAR; INTRAVENOUS at 06:40

## 2024-05-11 RX ADMIN — CEFTRIAXONE SODIUM 1000 MG: 1 INJECTION, POWDER, FOR SOLUTION INTRAMUSCULAR; INTRAVENOUS at 03:33

## 2024-05-11 RX ADMIN — HYDROCODONE BITARTRATE AND ACETAMINOPHEN 2 TABLET: 5; 325 TABLET ORAL at 22:46

## 2024-05-11 RX ADMIN — SODIUM CHLORIDE 200 MG: 9 INJECTION, SOLUTION INTRAVENOUS at 12:19

## 2024-05-11 RX ADMIN — SODIUM CHLORIDE: 9 INJECTION, SOLUTION INTRAVENOUS at 06:25

## 2024-05-11 RX ADMIN — HYDROCODONE BITARTRATE AND ACETAMINOPHEN 2 TABLET: 5; 325 TABLET ORAL at 14:22

## 2024-05-11 RX ADMIN — ONDANSETRON 4 MG: 2 INJECTION INTRAMUSCULAR; INTRAVENOUS at 01:10

## 2024-05-11 RX ADMIN — HYDROCODONE BITARTRATE AND ACETAMINOPHEN 2 TABLET: 5; 325 TABLET ORAL at 18:32

## 2024-05-11 ASSESSMENT — PAIN SCALES - GENERAL
PAINLEVEL_OUTOF10: 7
PAINLEVEL_OUTOF10: 5
PAINLEVEL_OUTOF10: 9
PAINLEVEL_OUTOF10: 7
PAINLEVEL_OUTOF10: 8
PAINLEVEL_OUTOF10: 6
PAINLEVEL_OUTOF10: 7
PAINLEVEL_OUTOF10: 4
PAINLEVEL_OUTOF10: 6
PAINLEVEL_OUTOF10: 10
PAINLEVEL_OUTOF10: 6
PAINLEVEL_OUTOF10: 5
PAINLEVEL_OUTOF10: 7

## 2024-05-11 ASSESSMENT — PAIN DESCRIPTION - ORIENTATION: ORIENTATION: RIGHT

## 2024-05-11 ASSESSMENT — PAIN DESCRIPTION - DESCRIPTORS: DESCRIPTORS: BURNING;STABBING

## 2024-05-11 ASSESSMENT — PAIN DESCRIPTION - LOCATION: LOCATION: BACK

## 2024-05-11 NOTE — ED PROVIDER NOTES
[facility]  Emergency Department Encounter  Emergency Medicine Resident     Pt Name:Sylvia Weller  MRN: [id number]  Birthdate 1980  Date of evaluation: 5/11/24  PCP:  Sachi Martel PA  Note Started: 12:04 AM EDT    CHIEF COMPLAINT       Chief Complaint   Patient presents with    Flank Pain     R sided; had stent removed today     HISTORY OF PRESENT ILLNESS  (Location/Symptom, Timing/Onset, Context/Setting, Quality, Duration, Modifying Factors, Severity.)      Sylvia Weller is a 43 y.o. female who presents with right flank pain. Patient has had complicated urologic course after injury to the right ureter. She has undergone multiple procedures including those listed below.     4/12/24-distal ureteral stricture with retrograde pyelogram and ureteroscopy underwent ureteral balloon dilation.    Stent was placed on 4/26/2024 for persistent stricture.    Had cystoscopy yesterday and the stent was removed from the right side. She took tramadol at home without relief of her pain symptoms. She denies abdominal pain. No chest pain or shortness of breath. No fevers. No dysuria. She has not noticed blood in urine.     PAST MEDICAL / SURGICAL / SOCIAL / FAMILY HISTORY      has a past medical history of Anxiety, Arthritis, Depression, Dysmenorrhea, History of blood transfusion, History of bronchitis, Hydronephrosis, Migraine, Muscle spasm, Ovarian cyst, Personal history of other medical treatment, Under care of service provider, Under care of service provider, Under care of service provider, Under care of service provider, and Uterine fibroid.     has a past surgical history that includes Laparoscopic hysterectomy (Bilateral, 02/14/2024); Hysterectomy (N/A, 02/14/2024); IR GUIDED NEPHROSTOMY CATH PLACEMENT RIGHT (02/16/2024); Cystoscopy w/ ureteral stent placement (Right, 04/12/2024); Cystoscopy (Right, 04/12/2024); Cystoscopy (04/26/2024); Wound debridement (Right, 04/26/2024); Ureter surgery (Right,

## 2024-05-11 NOTE — PROGRESS NOTES
Physical Therapy        Physical Therapy Cancel Note      DATE: 2024    NAME: Sylvia Weller  MRN: [id number]   : 1980      Patient not seen this date for Physical Therapy due to:    Patient independent with functional mobility. Will defer PT evaluation at this time. Please reorder PT if future needs arise. Spoke with pt. Pt denies acute PT concerns, agreeable to deferral.      Electronically signed by Christie Barrett PT on 2024 at 9:13 AM

## 2024-05-11 NOTE — PROGRESS NOTES
[facility]  Occupational Therapy Not Seen Note    DATE: 2024    NAME: Sylvia Weller  MRN: [id number]   : 1980      Patient not seen this date for Occupational Therapy due to:    Per pt, patient independent with ADLs and functional tasks with no acute OT needs. Will defer OT evaluation at this time. Please reorder OT if future needs arise.       Electronically signed by BENJAMIN Sesay/L on 2024 at 9:07 AM

## 2024-05-11 NOTE — ANESTHESIA POSTPROCEDURE EVALUATION
Department of Anesthesiology  Postprocedure Note    Patient: Sylvia Weller  MRN: [id number]  YOB: 1980  Date of evaluation: 5/11/2024    Procedure Summary       Date: 05/10/24 Room / Location: [address]    Anesthesia Start: 1209 Anesthesia Stop: 1244    Procedure: CYSTOSCOPY, RIGHT STENT REMOVAL Diagnosis:       Injury of right ureter, initial encounter      Ureteral stricture      (Injury of right ureter, initial encounter [S37.10XA])      (Ureteral stricture [N13.5])    Surgeons: Urban Cote Jr., MD Responsible Provider: Bharti Fowler MD    Anesthesia Type: MAC ASA Status: 2            Anesthesia Type: No value filed.    Nam Phase I: Nam Score: 10    Nam Phase II: Nam Score: 10    Anesthesia Post Evaluation    Patient location during evaluation: PACU  Patient participation: complete - patient participated  Level of consciousness: awake and alert  Pain score: 2  Airway patency: patent  Nausea & Vomiting: no nausea and no vomiting  Cardiovascular status: hemodynamically stable  Respiratory status: acceptable  Hydration status: euvolemic  Pain management: adequate    No notable events documented.

## 2024-05-11 NOTE — ED NOTES
ED to inpatient nurses report      Chief Complaint:  Chief Complaint   Patient presents with    Flank Pain     R sided; had stent removed today     Present to ED from: home    MOA:     LOC: alert and orientated to name, place, date  Mobility: Independent  Oxygen Baseline: RA    Current needs required: NA   Pending ED orders: Lactic at 0500  Present condition: stable    Why did the patient come to the ED? Lower back pain  What is the plan? Admission, ATB, urology consult  Any procedures or intervention occur? Labs, xray, US, ATB, blood cultures  Any safety concerns?? NA    Mental Status:  Level of Consciousness: Alert (0)    Psych Assessment: WNL     Vital signs   Vitals:    05/10/24 2343   BP: 138/79   Pulse: (!) 109   Resp: 18   Temp: 97.5 °F (36.4 °C)   TempSrc: Oral   SpO2: 100%   Weight: 77.1 kg (170 lb)        Vitals:  Patient Vitals for the past 24 hrs:   BP Temp Temp src Pulse Resp SpO2 Weight   05/10/24 2343 138/79 97.5 °F (36.4 °C) Oral (!) 109 18 100 % 77.1 kg (170 lb)      Visit Vitals  /79   Pulse (!) 109   Temp 97.5 °F (36.4 °C) (Oral)   Resp 18   Wt 77.1 kg (170 lb)   SpO2 100%   BMI 32.12 kg/m²        LDAs:   Peripheral IV 05/11/24 Proximal;Right Forearm (Active)       Peripheral IV 05/11/24 Left Forearm (Active)       Ambulatory Status:  Presents to emergency department  because of falls (Syncope, seizure, or loss of consciousness): No, Age > 70: No, Altered Mental Status, Intoxication with alcohol or substance confusion (Disorientation, impaired judgment, poor safety awaremess, or inability to follow instructions): No, Impaired Mobility: Ambulates or transfers with assistive devices or assistance; Unable to ambulate or transer.: No, Nursing Judgement: No    Diagnosis:  DISPOSITION Admitted 05/11/2024 03:40:43 AM   Final diagnoses:   Acute cystitis with hematuria   Hydronephrosis of right kidney   Septicemia (HCC)        Consults:  IP CONSULT TO UROLOGY  IP CONSULT TO HOSPITALIST     Treatment

## 2024-05-11 NOTE — ED PROVIDER NOTES
[facility]     Emergency Department     Faculty Note/ Attestation      Pt Name: Sylvia Weller                                       MRN: [id number]  Birthdate 1980  Date of evaluation: 5/10/2024  Note Started: 12:34 AM EDT    Patients PCP:    Sachi Martel PA    Attestation  I performed a history and physical examination of the patient and discussed management with the resident. I reviewed the resident’s note and agree with the documented findings and plan of care. Any areas of disagreement are noted on the chart. I was personally present for the key portions of any procedures. I have documented in the chart those procedures where I was not present during the key portions. I have reviewed the emergency nurses triage note. I agree with the chief complaint, past medical history, past surgical history, allergies, medications, social and family history as documented unless otherwise noted below.    For Physician Assistant/ Nurse Practitioner cases/documentation I have personally evaluated this patient and have completed at least one if not all key elements of the E/M (history, physical exam, and MDM). Additional findings are as noted.    Initial Screens:        Stan Coma Scale  Eye Opening: Spontaneous  Best Verbal Response: Oriented  Best Motor Response: Obeys commands  Edmond Coma Scale Score: 15    Vitals:    Vitals:    05/10/24 2343   BP: 138/79   Pulse: (!) 109   Resp: 18   Temp: 97.5 °F (36.4 °C)   TempSrc: Oral   SpO2: 100%   Weight: 77.1 kg (170 lb)       CHIEF COMPLAINT       Chief Complaint   Patient presents with    Flank Pain     R sided; had stent removed today       Patient is a 43-year-old female who had a stent removed earlier today and the pain immediately began to come back severe pain concerned that her hydronephrosis is returning and that she will need to be admitted      EMERGENCY DEPARTMENT COURSE:     -------------------------  BP: 138/79, Temp: 97.5 °F (36.4

## 2024-05-11 NOTE — CONSULTS
Winter Hodge, Aly, Jaguar, Anup, Kera, Jin, & Fran  Urology Consultation      Patient:  Sylvia Weller  MRN: [id number]  YOB: 1980    CHIEF COMPLAINT: Right hydronephrosis    HISTORY OF PRESENT ILLNESS:   The patient is a 43 y.o. female who presents with long urologic history.  Patient had a right ureteral injury after an abdominal hysterectomy and has since had issues with ureteral strictures.  Patient previously had percutaneous nephrostomy tubes and stent placed.  Patient then subsequently had endoscopic dilations and stent placements.  The stent was removed yesterday to see if the kidney would be able to drain on its own and have improvement of the area of the stricture.  However patient was readmitted due to right flank pain.  Creatinine 1.2 lactate within normal limits WBC 13 urinalysis with small leukocyte esterase otherwise negative.  Blood cultures negative.  Patient did have moderate hydroureter on the retrograde pyelogram during stent removal yesterday so the renal ultrasound repeat yesterday evening is consistent with this.    Patient's old records, notes and chart reviewed and summarized above.    Past Medical History:    Past Medical History:   Diagnosis Date    Anxiety     Arthritis     Knee-left    Depression     Dysmenorrhea     hysterectomy feb 2024    History of blood transfusion     feb 21, 2024    History of bronchitis     Hydronephrosis 02/16/2024    Migraine     Muscle spasm     Ovarian cyst     Personal history of other medical treatment     Patient wears oral braces    Under care of service provider     PCP: Sachi Martel PA-C, Beverley, last visit 8/2023    Under care of service provider     Podiatry: Dallas Mabry DPM, Tessa, last visit 1/2024    Under care of service provider 04/24/2024    OBGYN: Ricardo Hoffman, last visit april 2024    Under care of service provider 04/24/2024    urology clinic-last visit april 2024    Uterine fibroid     hysterectomy  bruising present.  Lungs: Respiratory effort normal.  Cardiovascular:  Regular rhythm.  Abdomen: Soft, non-tender, non-distended.  Right flank pain  Bladder non-tender and not distended.      Labs:  Recent Labs     05/11/24 0111   WBC 13.4*   HGB 11.4*   HCT 37.3   MCV 86.9        Recent Labs     05/11/24 0111      K 3.7      CO2 27   BUN 8   CREATININE 1.2*       Recent Labs     05/11/24 0114   COLORU Yellow   PHUR 8.0   WBCUA 20 TO 50   RBCUA 10 TO 20   BACTERIA FEW*   LEUKOCYTESUR SMALL*   UROBILINOGEN Normal   BILIRUBINUR NEGATIVE       Culture Results:  @[facility]@      -----------------------------------------------------------------  Imaging Results:  US RENAL COMPLETE    Result Date: 5/11/2024  EXAMINATION: RETROPERITONEAL ULTRASOUND OF THE KIDNEYS AND URINARY BLADDER 5/11/2024 COMPARISON: None HISTORY: ORDERING SYSTEM PROVIDED HISTORY: right stent removed TECHNOLOGIST PROVIDED HISTORY: right stent removed FINDINGS: Kidneys: The right kidney measures 12.9 in length and the left kidney measures 11.6 in length. Kidneys demonstrate normal cortical echogenicity.  There is moderate right hydronephrosis.  There is no hydronephrosis on the left. There is left ovarian cyst measures 3.1 cm. Bladder: Unremarkable appearance of the bladder.  No significant post void residual.     Moderate right hydronephrosis.       Assessment and Plan   Impression:    43-year-old female  Right ureteral stricture secondary to ureteral injury status post abdominal hysterectomy  Status post multiple endoscopic dilations and stent placements and postop day 1 from right stent removal      Plan:   If the patient has continued elevation of creatinine and persistent right flank pain we will plan for the patient to undergo a right percutaneous nephrostomy tube placement and plan for eventual long-term robotic assisted distal ureteral reimplant due to persistent stricture and delayed emptying on the right-hand side with

## 2024-05-11 NOTE — ED TRIAGE NOTES
Pt ambulatory to ED 47 from triage with spouse.  Pt is a/o x4.  Pt had a stent removed today and has had pain in her R lower back since.  Pt last took pain medications at discharge, has not gotten prescriptions filled.  Pt denies dysuria.  Pt denies SOB, CP but has a HA.  Pt vitals assessed and noted.  Spouse remains at bedside.  Care ongoing.  Call light provided.

## 2024-05-11 NOTE — H&P
Dis Brother     No Known Problems Brother     No Known Problems Brother     Heart Disease Maternal Grandmother     Heart Failure Maternal Grandmother     Cancer Maternal Grandfather     No Known Problems Paternal Grandmother     No Known Problems Paternal Grandfather        Review of Systems:     Positive and Negative as described in HPI.    Physical Exam:   /84   Pulse 71   Temp 97.7 °F (36.5 °C) (Oral)   Resp 18   Ht 1.575 m (5' 2\")   Wt 79.7 kg (175 lb 11.3 oz)   LMP 2024 (Exact Date) Comment: Urine hCG done today 2024 @0711 was negative  SpO2 100%   BMI 32.14 kg/m²   Temp (24hrs), Av.6 °F (36.4 °C), Min:97 °F (36.1 °C), Max:97.9 °F (36.6 °C)    No results for input(s): \"POCGLU\" in the last 72 hours.    Intake/Output Summary (Last 24 hours) at 2024 0617  Last data filed at 2024 0410  Gross per 24 hour   Intake 50 ml   Output --   Net 50 ml       General Appearance: alert, well appearing, and in no acute distress  Mental status: oriented to person, place, and time  Head: normocephalic, atraumatic  Eye: no icterus, redness, pupils equal and reactive, extraocular eye movements intact, conjunctiva clear  Ear: normal external ear, no discharge, hearing intact  Nose: no drainage noted  Mouth: mucous membranes moist  Neck: supple, no carotid bruits, thyroid not palpable  Lungs: Bilateral equal air entry, clear to ausculation, no wheezing, rales or rhonchi, normal effort  Cardiovascular: normal rate, regular rhythm, no murmur, gallop, rub  Abdomen: Soft, tenderness to palpation on the right flank area,, nondistended, normal bowel sounds, no hepatomegaly or splenomegaly  Skin: No gross lesions, rashes, bruising or bleeding on exposed skin area  Extremities: peripheral pulses palpable, no pedal edema or calf pain with palpation  Psych: normal affect    Investigations:      Laboratory Testing:  Recent Results (from the past 24 hour(s))   CBC with Auto Differential    Collection Time:  05/11/24  1:11 AM   Result Value Ref Range    WBC 13.4 (H) 3.5 - 11.3 k/uL    RBC 4.29 3.95 - 5.11 m/uL    Hemoglobin 11.4 (L) 11.9 - 15.1 g/dL    Hematocrit 37.3 36.3 - 47.1 %    MCV 86.9 82.6 - 102.9 fL    MCH 26.6 25.2 - 33.5 pg    MCHC 30.6 28.4 - 34.8 g/dL    RDW 17.7 (H) 11.8 - 14.4 %    Platelets 391 138 - 453 k/uL    MPV 11.1 8.1 - 13.5 fL    NRBC Automated 0.0 0.0 per 100 WBC    Neutrophils % 84 (H) 36 - 65 %    Lymphocytes % 7 (L) 24 - 43 %    Monocytes % 8 3 - 12 %    Eosinophils % 1 1 - 4 %    Basophils % 0 0 - 2 %    Immature Granulocytes % 0 0 %    Neutrophils Absolute 11.28 (H) 1.50 - 8.10 k/uL    Lymphocytes Absolute 0.88 (L) 1.10 - 3.70 k/uL    Monocytes Absolute 1.07 0.10 - 1.20 k/uL    Eosinophils Absolute 0.11 0.00 - 0.44 k/uL    Basophils Absolute 0.03 0.00 - 0.20 k/uL    Immature Granulocytes Absolute 0.06 0.00 - 0.30 k/uL    RBC Morphology ANISOCYTOSIS PRESENT    CMP    Collection Time: 05/11/24  1:11 AM   Result Value Ref Range    Sodium 139 136 - 145 mmol/L    Potassium 3.7 3.7 - 5.3 mmol/L    Chloride 102 98 - 107 mmol/L    CO2 27 20 - 31 mmol/L    Anion Gap 10 9 - 16 mmol/L    Glucose 116 (H) 74 - 99 mg/dL    BUN 8 6 - 20 mg/dL    Creatinine 1.2 (H) 0.50 - 0.90 mg/dL    Est, Glom Filt Rate 60 (L) >60 mL/min/1.73m2    Calcium 9.0 8.6 - 10.4 mg/dL    Total Protein 7.1 6.6 - 8.7 g/dL    Albumin 4.2 3.5 - 5.2 g/dL    Albumin/Globulin Ratio 1.0 1.0 - 2.5    Total Bilirubin 0.3 0.00 - 1.20 mg/dL    Alkaline Phosphatase 63 35 - 104 U/L    ALT 13 10 - 35 U/L    AST 24 10 - 35 U/L   Urinalysis with Reflex to Culture    Collection Time: 05/11/24  1:14 AM    Specimen: Urine   Result Value Ref Range    Color, UA Yellow Yellow    Turbidity UA Cloudy (A) Clear    Glucose, Ur NEGATIVE NEGATIVE mg/dL    Bilirubin, Urine NEGATIVE NEGATIVE    Ketones, Urine NEGATIVE NEGATIVE mg/dL    Specific Gravity, UA 1.012 1.005 - 1.030    Urine Hgb TRACE (A) NEGATIVE    pH, Urine 8.0 5.0 - 8.0    Protein, UA NEGATIVE

## 2024-05-11 NOTE — CARE COORDINATION
Case Management Assessment  Initial Evaluation    Date/Time of Evaluation: 5/11/2024 6:38 PM  Assessment Completed by: Yanni Pereira    If patient is discharged prior to next notation, then this note serves as note for discharge by case management.    Patient Name: Sylvia Weller                   YOB: 1980  Diagnosis: UTI (urinary tract infection) [N39.0]  Septicemia (HCC) [A41.9]  Hydronephrosis of right kidney [N13.30]  Acute cystitis with hematuria [N30.01]                   Date / Time: 5/11/2024 12:00 AM    Patient Admission Status: Inpatient   Readmission Risk (Low < 19, Mod (19-27), High > 27): Readmission Risk Score: 15.6    Current PCP: Sachi Martel PA  PCP verified by CM? (P) Yes (JOVON Lala)    Chart Reviewed: Yes      History Provided by: (P) Patient  Patient Orientation: (P) Alert and Oriented, Person, Place, Situation    Patient Cognition: (P) Alert    Hospitalization in the last 30 days (Readmission):  No    If yes, Readmission Assessment in CM Navigator will be completed.    Advance Directives:      Code Status: Full Code   Patient's Primary Decision Maker is: (P) Legal Next of Kin      Discharge Planning:    Patient lives with: (P) Children (17 year old son) Type of Home: (P) Apartment  Primary Care Giver: (P) Self  Patient Support Systems include: (P) Family Members, Friends/Neighbors   Current Financial resources: (P) Other (Comment) (has Medical [place] insurance)  Current community resources: (P) None  Current services prior to admission: (P) None            Current DME:              Type of Home Care services:  (P) None    ADLS  Prior functional level: (P) Independent in ADLs/IADLs  Current functional level: (P) Independent in ADLs/IADLs    PT AM-PAC:   /24  OT AM-PAC:   /24    Family can provide assistance at DC: (P) No (live out of town)  Would you like Case Management to discuss the discharge plan with any other family members/significant others, and if so, who? (P)

## 2024-05-11 NOTE — ED PROVIDER NOTES
[facility]   Emergency Department  Emergency Medicine Attending Sign-out   Note started: 2:08 PM EDT    Care of Sylvia Weller was assumed from previous attending Dr. Rascon at 3 AM and is being seen for Flank Pain (R sided; had stent removed today)  .  The patient's initial evaluation and plan have been discussed with the prior provider who initially evaluated the patient.     Attestation  I was available and discussed any additional care issues that arose and coordinated the management plans with the resident(s) caring for the patient during my duty period. Any areas of disagreement with resident's documentation of care or procedures are noted on the chart. I was personally present for the key portions of any/all procedures, during my duty period. I have documented in the chart those procedures where I was not present during the key portions.     BRIEF PATIENT SUMMARY/MDM COURSE PER INITIAL PROVIDER:   RECENT VITALS:     Temp: 98 °F (36.7 °C),  Pulse: 89, Respirations: 16, BP: 129/87, SpO2: 100 %    This patient is a 43 y.o. Female with recent ureteral stent that was removed today and now has worsening flank pain.  Awaiting labs, urology recs, and admit       OUTSTANDING TASKS / ADDITIONAL COMMENTS   Urology  Admit    Unique Martinez MD  Emergency Medicine Attending  [facility]       Unique Martinez MD  05/11/24 8120

## 2024-05-11 NOTE — PROGRESS NOTES
Perfect Served NP Geraldine Steward in regards to if patient can have diet for today. Also if patient can have oral pain medication, she only has dilaudid q4hr.     NP above put in new orders.

## 2024-05-11 NOTE — PROGRESS NOTES
Advance Care Planning     Advance Care Planning Inpatient Note  [facility] Department    Today's Date: 5/11/2024  Unit: [facility] ORTHO/MED SURG    Received request from HealthCare Provider.  Upon review of chart and communication with care team, patient's decision making abilities are not in question.. Patient was/were present in the room during visit.    Goals of ACP Conversation:  Discuss advance care planning documents    Health Care Decision Makers:     No healthcare decision makers have been documented.  Click here to complete HealthCare Decision Makers including selection of the Healthcare Decision Maker Relationship (ie \"Primary\")  Summary:  No Decision Maker named by patient at this time    Advance Care Planning Documents (Patient Wishes):  Healthcare Power of /Advance Directive Appointment of Health Care Agent     Assessment:  Patient appears to be calm and coping.  States that she is interested in a Living Will.   explained the documents.  Patient states that she wants to take time to look over the documents before completing.  Informed patient that when she is ready, we can come back to assist.      Interventions:  Provided education on documents for clarity and greater understanding  Encouraged ongoing ACP conversation with future decision makers and loved ones  Reviewed but did not complete ACP document    Care Preferences Communicated:   No    Outcomes/Plan:  ACP Discussion: Postponed    Electronically signed by HUY Clark on 5/11/2024 at 6:44 PM            05/11/24 1843   Encounter Summary   Encounter Overview/Reason Advance Care Planning   Service Provided For Patient   Referral/Consult From Nurse   Support System Family members   Last Encounter  05/11/24   Complexity of Encounter Moderate   Begin Time 1820   End Time  1840   Total Time Calculated 20 min   Advance Care Planning   Type ACP conversation   Assessment/Intervention/Outcome   Assessment Calm;Coping

## 2024-05-11 NOTE — PROGRESS NOTES
[facility]  Office: [phone]  Deonte Aguirre DO, Dmitry Sibley, DO, Leonel Cabrera DO, Karlo Florian DO, Melanie Mancia MD, Kathy Connell MD, Sammie Ruiz MD, Keyona Santos MD,  Oli Cervantes MD, Clare Montesinos MD, Jennifer Johnson MD,  Tomer Rowley DO, Zora Branch MD, Joe Parker MD, Chano Aguirre DO, Vidhya Das MD,  Dallas Mares DO, Verena Loomis MD, Flor Krueger MD, Alyssa Zuleta MD, Daksha Cordon MD,  Ildefonso Siddiqui MD, Jose Rivera MD, Liyah Cao MD, Diana Mojica MD, Kishor Hoyos MD, Yoly Meza MD, Gabo Sarkar DO, Asa Baez DO, Jaciel Waterman MD,  Eliot Prakash MD, Shirley Waterhouse, CNP,  Rosalina Sagastume CNP, Urban Musa, CNP,  Geraldine Steward, DNP, Laura Bucio, CNP, Safia Davis, CNP, Sandrine Milian, CNP, Milly Jeffries, CNP, Damaris Ross PA-C, Pati Curry PA-C, Prerna Villalba, CNP, Milly Stahl, CNP, Nayla Rivera, CNP, Safia Scott, CNP, Beti Frank, CNP, Ana Rosa Lopes, CNS, Yuli Cardona, CNP, Paulina Jackson CNP, Tracy Schwab, CNP         [facility]   IN-PATIENT SERVICE   [facility]    Second Visit Note  For more detailed information please refer to the progress note of the day      5/11/2024    10:48 AM    Name:   Sylvia Weller  MRN:     [id number]     Acct:      [id number]   Room:   0250/0250-01  IP Day:  0  Admit Date:  5/11/2024 12:00 AM    PCP:   Sachi Martel PA  Code Status:  Full Code      Pt vitals were reviewed   New labs were reviewed   Patient was seen  Admitted with previous right urethral injury after abdominal hysterectomy resulting in urethral strictures.  Status post PCN tubes and stent placement.  Stent removed on 5/10/2024 however this was not successful in alleviating hydronephrosis.  Patient readmitted with RFEDDY and flank pain.  Urologic plan if the patient has continued elevation of creatinine and persistent right flank pain we will plan for the patient to

## 2024-05-12 LAB
ANION GAP SERPL CALCULATED.3IONS-SCNC: 9 MMOL/L (ref 9–16)
BUN SERPL-MCNC: 7 MG/DL (ref 6–20)
CALCIUM SERPL-MCNC: 8.4 MG/DL (ref 8.6–10.4)
CHLORIDE SERPL-SCNC: 106 MMOL/L (ref 98–107)
CO2 SERPL-SCNC: 21 MMOL/L (ref 20–31)
CREAT SERPL-MCNC: 1.2 MG/DL (ref 0.5–0.9)
ERYTHROCYTE [DISTWIDTH] IN BLOOD BY AUTOMATED COUNT: 17.5 % (ref 11.8–14.4)
GFR, ESTIMATED: 57 ML/MIN/1.73M2
GLUCOSE SERPL-MCNC: 94 MG/DL (ref 74–99)
HCT VFR BLD AUTO: 33.3 % (ref 36.3–47.1)
HGB BLD-MCNC: 10 G/DL (ref 11.9–15.1)
INR PPP: 1.3
MCH RBC QN AUTO: 26.7 PG (ref 25.2–33.5)
MCHC RBC AUTO-ENTMCNC: 30 G/DL (ref 28.4–34.8)
MCV RBC AUTO: 88.8 FL (ref 82.6–102.9)
MICROORGANISM SPEC CULT: ABNORMAL
MICROORGANISM SPEC CULT: NO GROWTH
NRBC BLD-RTO: 0 PER 100 WBC
PARTIAL THROMBOPLASTIN TIME: 31.6 SEC (ref 23–36.5)
PLATELET # BLD AUTO: 295 K/UL (ref 138–453)
PMV BLD AUTO: 11.3 FL (ref 8.1–13.5)
POTASSIUM SERPL-SCNC: 3.7 MMOL/L (ref 3.7–5.3)
PROTHROMBIN TIME: 15.6 SEC (ref 11.7–14.9)
RBC # BLD AUTO: 3.75 M/UL (ref 3.95–5.11)
SERVICE CMNT-IMP: ABNORMAL
SODIUM SERPL-SCNC: 136 MMOL/L (ref 136–145)
SPECIMEN DESCRIPTION: ABNORMAL
SPECIMEN DESCRIPTION: NORMAL
WBC OTHER # BLD: 16.6 K/UL (ref 3.5–11.3)

## 2024-05-12 PROCEDURE — 80048 BASIC METABOLIC PNL TOTAL CA: CPT

## 2024-05-12 PROCEDURE — 99232 SBSQ HOSP IP/OBS MODERATE 35: CPT | Performed by: NURSE PRACTITIONER

## 2024-05-12 PROCEDURE — 85610 PROTHROMBIN TIME: CPT

## 2024-05-12 PROCEDURE — 1200000000 HC SEMI PRIVATE

## 2024-05-12 PROCEDURE — 36415 COLL VENOUS BLD VENIPUNCTURE: CPT

## 2024-05-12 PROCEDURE — 6370000000 HC RX 637 (ALT 250 FOR IP): Performed by: NURSE PRACTITIONER

## 2024-05-12 PROCEDURE — 6360000002 HC RX W HCPCS: Performed by: NURSE PRACTITIONER

## 2024-05-12 PROCEDURE — 87040 BLOOD CULTURE FOR BACTERIA: CPT

## 2024-05-12 PROCEDURE — 2580000003 HC RX 258: Performed by: NURSE PRACTITIONER

## 2024-05-12 PROCEDURE — 85027 COMPLETE CBC AUTOMATED: CPT

## 2024-05-12 PROCEDURE — 85730 THROMBOPLASTIN TIME PARTIAL: CPT

## 2024-05-12 RX ORDER — VITAMIN C
1 TAB ORAL DAILY
Status: DISCONTINUED | OUTPATIENT
Start: 2024-05-12 | End: 2024-05-15 | Stop reason: HOSPADM

## 2024-05-12 RX ORDER — ALBUTEROL SULFATE 90 UG/1
2 AEROSOL, METERED RESPIRATORY (INHALATION) EVERY 6 HOURS PRN
Status: DISCONTINUED | OUTPATIENT
Start: 2024-05-12 | End: 2024-05-15 | Stop reason: HOSPADM

## 2024-05-12 RX ORDER — CYCLOBENZAPRINE HCL 10 MG
10 TABLET ORAL EVERY 8 HOURS PRN
Status: DISCONTINUED | OUTPATIENT
Start: 2024-05-12 | End: 2024-05-15 | Stop reason: HOSPADM

## 2024-05-12 RX ORDER — FLUOXETINE HYDROCHLORIDE 20 MG/1
20 CAPSULE ORAL DAILY
Status: DISCONTINUED | OUTPATIENT
Start: 2024-05-12 | End: 2024-05-15 | Stop reason: HOSPADM

## 2024-05-12 RX ORDER — VITAMIN B COMPLEX
5000 TABLET ORAL DAILY
Status: DISCONTINUED | OUTPATIENT
Start: 2024-05-12 | End: 2024-05-15 | Stop reason: HOSPADM

## 2024-05-12 RX ORDER — SENNOSIDES A AND B 8.6 MG/1
2 TABLET, FILM COATED ORAL DAILY
Status: DISCONTINUED | OUTPATIENT
Start: 2024-05-12 | End: 2024-05-15 | Stop reason: HOSPADM

## 2024-05-12 RX ADMIN — HYDROCODONE BITARTRATE AND ACETAMINOPHEN 2 TABLET: 5; 325 TABLET ORAL at 09:03

## 2024-05-12 RX ADMIN — SODIUM CHLORIDE: 9 INJECTION, SOLUTION INTRAVENOUS at 12:49

## 2024-05-12 RX ADMIN — HYDROCODONE BITARTRATE AND ACETAMINOPHEN 2 TABLET: 5; 325 TABLET ORAL at 17:42

## 2024-05-12 RX ADMIN — CHOLECALCIFEROL TAB 25 MCG (1000 UNIT) 5000 UNITS: 25 TAB at 14:39

## 2024-05-12 RX ADMIN — HYDROCODONE BITARTRATE AND ACETAMINOPHEN 2 TABLET: 5; 325 TABLET ORAL at 13:42

## 2024-05-12 RX ADMIN — SODIUM CHLORIDE 200 MG: 9 INJECTION, SOLUTION INTRAVENOUS at 10:52

## 2024-05-12 RX ADMIN — FLUOXETINE HYDROCHLORIDE 20 MG: 20 CAPSULE ORAL at 14:39

## 2024-05-12 RX ADMIN — Medication 1 TABLET: at 15:52

## 2024-05-12 RX ADMIN — CYCLOBENZAPRINE 10 MG: 10 TABLET, FILM COATED ORAL at 14:39

## 2024-05-12 RX ADMIN — Medication 1000 MG: at 03:20

## 2024-05-12 RX ADMIN — HYDROCODONE BITARTRATE AND ACETAMINOPHEN 2 TABLET: 5; 325 TABLET ORAL at 05:08

## 2024-05-12 RX ADMIN — SENNOSIDES 17.2 MG: 8.6 TABLET, FILM COATED ORAL at 10:51

## 2024-05-12 RX ADMIN — SODIUM CHLORIDE: 9 INJECTION, SOLUTION INTRAVENOUS at 03:04

## 2024-05-12 RX ADMIN — HYDROCODONE BITARTRATE AND ACETAMINOPHEN 2 TABLET: 5; 325 TABLET ORAL at 21:30

## 2024-05-12 ASSESSMENT — PAIN SCALES - GENERAL
PAINLEVEL_OUTOF10: 5
PAINLEVEL_OUTOF10: 5
PAINLEVEL_OUTOF10: 9
PAINLEVEL_OUTOF10: 5
PAINLEVEL_OUTOF10: 9
PAINLEVEL_OUTOF10: 9
PAINLEVEL_OUTOF10: 7
PAINLEVEL_OUTOF10: 7
PAINLEVEL_OUTOF10: 10
PAINLEVEL_OUTOF10: 7

## 2024-05-12 NOTE — PLAN OF CARE
Problem: Discharge Planning  Goal: Discharge to home or other facility with appropriate resources  5/12/2024 0501 by Irma Rodriguez, RN  Outcome: Progressing  5/11/2024 1620 by Marylu Miner, RN  Outcome: Progressing     Problem: Pain  Goal: Verbalizes/displays adequate comfort level or baseline comfort level  5/12/2024 0501 by Irma Rodriguez, RN  Outcome: Progressing  5/11/2024 1620 by Marylu Miner, RN  Outcome: Progressing

## 2024-05-12 NOTE — PLAN OF CARE
Problem: Discharge Planning  Goal: Discharge to home or other facility with appropriate resources  5/12/2024 1756 by Marylu Miner, RN  Outcome: Progressing  5/12/2024 0501 by Irma Rodriguez RN  Outcome: Progressing     Problem: Pain  Goal: Verbalizes/displays adequate comfort level or baseline comfort level  5/12/2024 1756 by Mraylu Miner, RN  Outcome: Progressing  5/12/2024 0501 by Irma Rodriguez, RN  Outcome: Progressing

## 2024-05-12 NOTE — PROGRESS NOTES
[facility]  Office: [phone]  Deonte Aguirre DO, Dmitry Sibley DO, Leonel Cabrera DO, Karlo Florian DO, Melanie Mancia MD, Kathy Connell MD, Sammie Ruiz MD, Keyona Santos MD,  Oli Cervantes MD, Clare Montesinos MD, Jennifer Johnson MD,  Tomer Rowley DO, Zora Branch MD, Joe Parker MD, Chano Aguirre DO, Vidhya Das MD,  Dallas Mares DO, Verena Loomis MD, Flor Krueger MD, Alyssa Zuleta MD, Daksha Cordon MD,  Ildefonso Siddiqui MD, Jose Rivera MD, Liyah Cao MD, Diana Mojica MD, Kishor Hoyos MD, Yoly Meza MD, Gabo Sarkar DO, Asa Baez DO, Jaciel Waterman MD,  Eliot Prakash MD, Shirley Waterhouse, CNP,  Rosalina Sagastume CNP, Urban Musa, CNP,  Geraldine Steward, DNP, Laura Bucio, CNP, Safia Davis, CNP, Sandrine Milian, CNP, Milly Jeffries, CNP, Damaris Ross, PA-C, Pati Curry PA-C, Prerna Villalba, CNP, Milly Stahl, CNP, Nayla Rivera, CNP, Safia Scott, CNP, Beti Frank, CNP, Ana Rosa Lopes, CNS, Yuli Cardona, CNP, Paulina Jackson CNP, Tracy Schwab, CNP         [facility]   IN-PATIENT SERVICE   [facility]    Progress Note    5/12/2024    8:57 AM    Name:   Sylvia Weller  MRN:     [id number]     Acct:      [id number]   Room:   0250/0250-[facility] Day:  1  Admit Date:  5/11/2024 12:00 AM    PCP:   Sachi Martel PA  Code Status:  Full Code    Subjective:     C/C:   Chief Complaint   Patient presents with    Flank Pain     R sided; had stent removed today     Interval History Status: not changed.     Patient seen and evaluated in room sitting in bed.  No acute events overnight.  Renal function essentially unchanged from yesterday.  Urology planning renal scan tomorrow and if there is persistent delayed emptying and obstruction urology is planning a right PCN versus stent    Brief History:     Admitted with previous right urethral injury after abdominal hysterectomy resulting in urethral strictures.  Status post

## 2024-05-12 NOTE — PROGRESS NOTES
[facility], Khan, Mostafa, Buck, Murtagh Jr  Urology Progress Note     Subjective:   Afebrile hemodynamically stable  Pain unchanged still having flank pain requiring pain medication  Creatinine yesterday stable 1.1 from 1.2 pending repeat this morning  Voiding  Urine culture negative    Patient Vitals for the past 24 hrs:   BP Temp Temp src Pulse Resp SpO2   05/11/24 1930 112/72 98 °F (36.7 °C) Oral 89 16 98 %   05/11/24 1902 -- -- -- -- 16 --   05/11/24 1452 -- -- -- -- 16 --   05/11/24 1050 -- -- -- -- 16 --   05/11/24 0828 129/87 98 °F (36.7 °C) Oral 89 16 100 %       Intake/Output Summary (Last 24 hours) at 5/12/2024 0720  Last data filed at 5/11/2024 0836  Gross per 24 hour   Intake --   Output 200 ml   Net -200 ml       Recent Labs     05/11/24  0111 05/11/24  0751   WBC 13.4* 12.7*   HGB 11.4* 10.5*   HCT 37.3 34.5*   MCV 86.9 89.8    379     Recent Labs     05/11/24  0111 05/11/24  0751    137   K 3.7 3.9    105   CO2 27 22   BUN 8 8   CREATININE 1.2* 1.1*       Recent Labs     05/11/24  0114   COLORU Yellow   PHUR 8.0   WBCUA 20 TO 50   RBCUA 10 TO 20   BACTERIA FEW*   LEUKOCYTESUR SMALL*   UROBILINOGEN Normal   BILIRUBINUR NEGATIVE       Additional Lab/culture results:    Physical Exam:   No acute distress  Regular rate and rhythm  Symmetric chest rise normal work of breathing  Soft nontender nondistended right flank pain  Voiding    Interval Imaging Findings:    Impression:    43-year-old female  Right ureteral stricture secondary to ureteral injury status post abdominal hysterectomy  Status post multiple endoscopic dilations and stent placements and postop day 1 from right stent removal        Plan:   Pt to undergo a NM renal scan tmrw and if persistent delayed emptying and obstruction we will plan for a right percutaneous nephrostomy tube placement preferably, unless pt would really prefer a stent  and plan for eventual long-term robotic assisted distal ureteral

## 2024-05-13 ENCOUNTER — APPOINTMENT (OUTPATIENT)
Dept: NUCLEAR MEDICINE | Age: 44
DRG: 699 | End: 2024-05-13
Payer: COMMERCIAL

## 2024-05-13 LAB
ANION GAP SERPL CALCULATED.3IONS-SCNC: 9 MMOL/L (ref 9–16)
BUN SERPL-MCNC: 5 MG/DL (ref 6–20)
CALCIUM SERPL-MCNC: 8.3 MG/DL (ref 8.6–10.4)
CHLORIDE SERPL-SCNC: 108 MMOL/L (ref 98–107)
CO2 SERPL-SCNC: 19 MMOL/L (ref 20–31)
CREAT SERPL-MCNC: 1.2 MG/DL (ref 0.5–0.9)
ERYTHROCYTE [DISTWIDTH] IN BLOOD BY AUTOMATED COUNT: 17.4 % (ref 11.8–14.4)
GFR, ESTIMATED: 58 ML/MIN/1.73M2
GLUCOSE SERPL-MCNC: 82 MG/DL (ref 74–99)
HCT VFR BLD AUTO: 29.5 % (ref 36.3–47.1)
HGB BLD-MCNC: 9 G/DL (ref 11.9–15.1)
INR PPP: 1.3
IRON SATN MFR SERPL: 6 % (ref 20–55)
IRON SERPL-MCNC: 16 UG/DL (ref 37–145)
MCH RBC QN AUTO: 27.3 PG (ref 25.2–33.5)
MCHC RBC AUTO-ENTMCNC: 30.5 G/DL (ref 28.4–34.8)
MCV RBC AUTO: 89.4 FL (ref 82.6–102.9)
NRBC BLD-RTO: 0 PER 100 WBC
PLATELET # BLD AUTO: 265 K/UL (ref 138–453)
PMV BLD AUTO: 11.3 FL (ref 8.1–13.5)
POTASSIUM SERPL-SCNC: 3.7 MMOL/L (ref 3.7–5.3)
PROTHROMBIN TIME: 16.3 SEC (ref 11.7–14.9)
RBC # BLD AUTO: 3.3 M/UL (ref 3.95–5.11)
SODIUM SERPL-SCNC: 136 MMOL/L (ref 136–145)
TIBC SERPL-MCNC: 254 UG/DL (ref 250–450)
UNSATURATED IRON BINDING CAPACITY: 238 UG/DL (ref 112–347)
WBC OTHER # BLD: 15.4 K/UL (ref 3.5–11.3)

## 2024-05-13 PROCEDURE — 2580000003 HC RX 258: Performed by: NURSE PRACTITIONER

## 2024-05-13 PROCEDURE — 6360000002 HC RX W HCPCS: Performed by: NURSE PRACTITIONER

## 2024-05-13 PROCEDURE — 83540 ASSAY OF IRON: CPT

## 2024-05-13 PROCEDURE — 99232 SBSQ HOSP IP/OBS MODERATE 35: CPT | Performed by: NURSE PRACTITIONER

## 2024-05-13 PROCEDURE — 6370000000 HC RX 637 (ALT 250 FOR IP): Performed by: NURSE PRACTITIONER

## 2024-05-13 PROCEDURE — 85610 PROTHROMBIN TIME: CPT

## 2024-05-13 PROCEDURE — 83550 IRON BINDING TEST: CPT

## 2024-05-13 PROCEDURE — 85027 COMPLETE CBC AUTOMATED: CPT

## 2024-05-13 PROCEDURE — 80048 BASIC METABOLIC PNL TOTAL CA: CPT

## 2024-05-13 PROCEDURE — 1200000000 HC SEMI PRIVATE

## 2024-05-13 PROCEDURE — 2580000003 HC RX 258: Performed by: STUDENT IN AN ORGANIZED HEALTH CARE EDUCATION/TRAINING PROGRAM

## 2024-05-13 PROCEDURE — 36415 COLL VENOUS BLD VENIPUNCTURE: CPT

## 2024-05-13 PROCEDURE — A9562 TC99M MERTIATIDE: HCPCS | Performed by: STUDENT IN AN ORGANIZED HEALTH CARE EDUCATION/TRAINING PROGRAM

## 2024-05-13 PROCEDURE — 3430000000 HC RX DIAGNOSTIC RADIOPHARMACEUTICAL: Performed by: STUDENT IN AN ORGANIZED HEALTH CARE EDUCATION/TRAINING PROGRAM

## 2024-05-13 PROCEDURE — 6360000002 HC RX W HCPCS: Performed by: STUDENT IN AN ORGANIZED HEALTH CARE EDUCATION/TRAINING PROGRAM

## 2024-05-13 PROCEDURE — 78708 K FLOW/FUNCT IMAGE W/DRUG: CPT

## 2024-05-13 RX ORDER — FUROSEMIDE 10 MG/ML
40 INJECTION INTRAMUSCULAR; INTRAVENOUS ONCE
Status: COMPLETED | OUTPATIENT
Start: 2024-05-13 | End: 2024-05-13

## 2024-05-13 RX ORDER — SODIUM CHLORIDE 0.9 % (FLUSH) 0.9 %
10 SYRINGE (ML) INJECTION PRN
Status: DISCONTINUED | OUTPATIENT
Start: 2024-05-13 | End: 2024-05-15 | Stop reason: HOSPADM

## 2024-05-13 RX ADMIN — SODIUM CHLORIDE 200 MG: 9 INJECTION, SOLUTION INTRAVENOUS at 14:45

## 2024-05-13 RX ADMIN — CHOLECALCIFEROL TAB 25 MCG (1000 UNIT) 5000 UNITS: 25 TAB at 08:11

## 2024-05-13 RX ADMIN — Medication 1000 MG: at 02:56

## 2024-05-13 RX ADMIN — SODIUM CHLORIDE: 9 INJECTION, SOLUTION INTRAVENOUS at 04:09

## 2024-05-13 RX ADMIN — HYDROCODONE BITARTRATE AND ACETAMINOPHEN 2 TABLET: 5; 325 TABLET ORAL at 19:07

## 2024-05-13 RX ADMIN — SODIUM CHLORIDE, PRESERVATIVE FREE 10 ML: 5 INJECTION INTRAVENOUS at 08:11

## 2024-05-13 RX ADMIN — CYCLOBENZAPRINE 10 MG: 10 TABLET, FILM COATED ORAL at 02:52

## 2024-05-13 RX ADMIN — CYCLOBENZAPRINE 10 MG: 10 TABLET, FILM COATED ORAL at 14:10

## 2024-05-13 RX ADMIN — HYDROCODONE BITARTRATE AND ACETAMINOPHEN 2 TABLET: 5; 325 TABLET ORAL at 06:46

## 2024-05-13 RX ADMIN — SODIUM CHLORIDE, PRESERVATIVE FREE 10 ML: 5 INJECTION INTRAVENOUS at 11:05

## 2024-05-13 RX ADMIN — FLUOXETINE HYDROCHLORIDE 20 MG: 20 CAPSULE ORAL at 08:11

## 2024-05-13 RX ADMIN — FUROSEMIDE 40 MG: 10 INJECTION, SOLUTION INTRAMUSCULAR; INTRAVENOUS at 11:05

## 2024-05-13 RX ADMIN — POLYETHYLENE GLYCOL 3350 17 G: 17 POWDER, FOR SOLUTION ORAL at 14:49

## 2024-05-13 RX ADMIN — TECHNESCAN TC 99M MERTIATIDE 2 MILLICURIE: 1 INJECTION, POWDER, LYOPHILIZED, FOR SOLUTION INTRAVENOUS at 10:40

## 2024-05-13 RX ADMIN — HYDROCODONE BITARTRATE AND ACETAMINOPHEN 2 TABLET: 5; 325 TABLET ORAL at 02:52

## 2024-05-13 RX ADMIN — SODIUM CHLORIDE, PRESERVATIVE FREE 10 ML: 5 INJECTION INTRAVENOUS at 10:40

## 2024-05-13 RX ADMIN — HYDROCODONE BITARTRATE AND ACETAMINOPHEN 2 TABLET: 5; 325 TABLET ORAL at 14:10

## 2024-05-13 RX ADMIN — SENNOSIDES 17.2 MG: 8.6 TABLET, FILM COATED ORAL at 08:11

## 2024-05-13 RX ADMIN — SODIUM CHLORIDE, PRESERVATIVE FREE 10 ML: 5 INJECTION INTRAVENOUS at 22:54

## 2024-05-13 RX ADMIN — Medication 1 TABLET: at 08:11

## 2024-05-13 ASSESSMENT — PAIN DESCRIPTION - DESCRIPTORS: DESCRIPTORS: ACHING;CRAMPING

## 2024-05-13 ASSESSMENT — PAIN SCALES - GENERAL
PAINLEVEL_OUTOF10: 3
PAINLEVEL_OUTOF10: 5
PAINLEVEL_OUTOF10: 7
PAINLEVEL_OUTOF10: 7
PAINLEVEL_OUTOF10: 8
PAINLEVEL_OUTOF10: 10
PAINLEVEL_OUTOF10: 6

## 2024-05-13 ASSESSMENT — PAIN DESCRIPTION - ORIENTATION: ORIENTATION: RIGHT

## 2024-05-13 ASSESSMENT — PAIN DESCRIPTION - LOCATION: LOCATION: ABDOMEN

## 2024-05-13 NOTE — PLAN OF CARE
Problem: Discharge Planning  Goal: Discharge to home or other facility with appropriate resources  5/13/2024 0551 by Irma Rodriguez, RN  Outcome: Progressing  5/12/2024 1756 by Marylu Miner, RN  Outcome: Progressing     Problem: Pain  Goal: Verbalizes/displays adequate comfort level or baseline comfort level  5/13/2024 0551 by Irma Rodriguez, RN  Outcome: Progressing  5/12/2024 1756 by Marylu Miner, RN  Outcome: Progressing

## 2024-05-13 NOTE — PROGRESS NOTES
Continuous Infusions:    sodium chloride       PRN Meds: albuterol sulfate HFA, cyclobenzaprine, sodium chloride flush, sodium chloride, potassium chloride **OR** potassium alternative oral replacement **OR** potassium chloride, magnesium sulfate, ondansetron **OR** ondansetron, polyethylene glycol, acetaminophen **OR** acetaminophen, HYDROcodone 5 mg - acetaminophen **OR** HYDROcodone 5 mg - acetaminophen    Data:     Past Medical History:   has a past medical history of Anxiety, Arthritis, Depression, Dysmenorrhea, History of blood transfusion, History of bronchitis, Hydronephrosis, Migraine, Muscle spasm, Ovarian cyst, Personal history of other medical treatment, Under care of service provider, Under care of service provider, Under care of service provider, Under care of service provider, and Uterine fibroid.    Social History:   reports that she has been smoking cigarettes. She has a 3.0 pack-year smoking history. She has been exposed to tobacco smoke. She has never used smokeless tobacco. She reports current alcohol use. She reports current drug use. Frequency: 3.00 times per week. Drug: Marijuana (Weed).     Family History:   Family History   Problem Relation Age of Onset    Other Mother         possible ovarian Ca 8/03/23 pending pending bx    Hypertension Mother     COPD Mother         smoker    Arthritis Mother     Cirrhosis Father     Alcohol Abuse Father     Other Sister         blood disorder, low RBC ct    Asthma Brother     Inflam Bowel Dis Brother     No Known Problems Brother     No Known Problems Brother     Heart Disease Maternal Grandmother     Heart Failure Maternal Grandmother     Cancer Maternal Grandfather     No Known Problems Paternal Grandmother     No Known Problems Paternal Grandfather        Vitals:  /75   Pulse (!) 102   Temp 97.9 °F (36.6 °C) (Oral)   Resp 17   Ht 1.575 m (5' 2\")   Wt 79.7 kg (175 lb 11.3 oz)   LMP 02/02/2024 (Exact Date) Comment: Urine hCG done today  pelvis/abdominal pain with light palpation. Profound CVA tenderness.     Assessment:        Hospital Problems             Last Modified POA    * (Principal) UTI (urinary tract infection) 5/11/2024 Yes    Tobacco use 5/11/2024 Yes    Overview Signed 11/2/2022  4:34 PM by Emilia Bardales DO     Desires Nicotine Patch         Hydronephrosis 5/11/2024 Yes    Overview Signed 2/19/2024 10:37 AM by Ana Donahue DO     Right ureteral stent in place         FREDDY (acute kidney injury) (HCC) 5/11/2024 Yes    Flank pain 5/11/2024 Yes       Plan:        Obstruct uropathy with right sided hydronephrosis. S/P nuclear renal scan showing evidence of high-grade obstruction on the right. Urology following. Further treatment POC pending patient and urology discussion. PRN pain management.   UTI: On Rocephin. BC 1/2 strep (+). No growth on repeat cultures x 24 hours.   History of iron deficiency anemia: On Venofer. Add-on iron studies.   FREDDY: Creatinine persistently 1.2. Voiding well. Continue to trend with daily BMP. Avoid nephrotoxic agents and contrast as able.   Tobacco cessation strongly encouraged. Nicotine patch offered.     MARIBELL DOWNS, JOVON - NP  5/13/2024  8:06 AM

## 2024-05-13 NOTE — PROGRESS NOTES
[facility], Khan, Mostafa, Buck, Murtagh Jr  Urology Progress Note     Subjective:   Afebrile hemodynamically stable  Pain unchanged still having flank pain requiring pain medication  Creatinine remained stable 1.2 from 1.2  Urine culture negative    Patient Vitals for the past 24 hrs:   BP Temp Temp src Pulse Resp SpO2   05/12/24 2039 115/75 97.9 °F (36.6 °C) Oral (!) 102 17 100 %   05/12/24 1812 -- -- -- -- 16 --   05/12/24 1412 -- -- -- -- 16 --   05/12/24 0933 -- -- -- -- 16 --       No intake or output data in the 24 hours ending 05/13/24 0751      Recent Labs     05/11/24  0751 05/12/24  0657 05/13/24  0552   WBC 12.7* 16.6* 15.4*   HGB 10.5* 10.0* 9.0*   HCT 34.5* 33.3* 29.5*   MCV 89.8 88.8 89.4    295 265       Recent Labs     05/11/24  0751 05/12/24  0657 05/13/24  0552    136 136   K 3.9 3.7 3.7    106 108*   CO2 22 21 19*   BUN 8 7 5*   CREATININE 1.1* 1.2* 1.2*         Recent Labs     05/11/24  0114   COLORU Yellow   PHUR 8.0   WBCUA 20 TO 50   RBCUA 10 TO 20   BACTERIA FEW*   LEUKOCYTESUR SMALL*   UROBILINOGEN Normal   BILIRUBINUR NEGATIVE         Additional Lab/culture results:    Physical Exam:   No acute distress  Regular rate and rhythm  Symmetric chest rise normal work of breathing  Soft nontender nondistended right flank pain  Voiding    Interval Imaging Findings:    Impression:    43-year-old female  Right ureteral stricture secondary to ureteral injury status post abdominal hysterectomy  Status post multiple endoscopic dilations and stent placements and postop right stent removal 5/10         Plan:   Pt to undergo a NM renal scan today at approximately 10 AM per nuclear medicine department and if persistent delayed emptying and obstruction we will plan for a right percutaneous nephrostomy tube placement preferably, unless pt would really prefer a stent  and plan for eventual long-term robotic assisted distal ureteral reimplant due to persistent stricture and

## 2024-05-14 ENCOUNTER — APPOINTMENT (OUTPATIENT)
Dept: INTERVENTIONAL RADIOLOGY/VASCULAR | Age: 44
DRG: 699 | End: 2024-05-14
Payer: COMMERCIAL

## 2024-05-14 PROBLEM — E87.6 HYPOKALEMIA: Status: ACTIVE | Noted: 2024-05-14

## 2024-05-14 LAB
ANION GAP SERPL CALCULATED.3IONS-SCNC: 11 MMOL/L (ref 9–16)
BUN SERPL-MCNC: 8 MG/DL (ref 6–20)
CALCIUM SERPL-MCNC: 8.7 MG/DL (ref 8.6–10.4)
CHLORIDE SERPL-SCNC: 101 MMOL/L (ref 98–107)
CO2 SERPL-SCNC: 23 MMOL/L (ref 20–31)
CREAT SERPL-MCNC: 1.2 MG/DL (ref 0.5–0.9)
ERYTHROCYTE [DISTWIDTH] IN BLOOD BY AUTOMATED COUNT: 17.4 % (ref 11.8–14.4)
GFR, ESTIMATED: 57 ML/MIN/1.73M2
GLUCOSE SERPL-MCNC: 96 MG/DL (ref 74–99)
HCT VFR BLD AUTO: 32.8 % (ref 36.3–47.1)
HGB BLD-MCNC: 9.9 G/DL (ref 11.9–15.1)
MCH RBC QN AUTO: 27 PG (ref 25.2–33.5)
MCHC RBC AUTO-ENTMCNC: 30.2 G/DL (ref 28.4–34.8)
MCV RBC AUTO: 89.6 FL (ref 82.6–102.9)
MICROORGANISM SPEC CULT: NORMAL
NRBC BLD-RTO: 0 PER 100 WBC
PLATELET # BLD AUTO: 295 K/UL (ref 138–453)
PMV BLD AUTO: 11.6 FL (ref 8.1–13.5)
POTASSIUM SERPL-SCNC: 3.2 MMOL/L (ref 3.7–5.3)
RBC # BLD AUTO: 3.66 M/UL (ref 3.95–5.11)
SODIUM SERPL-SCNC: 135 MMOL/L (ref 136–145)
SPECIMEN DESCRIPTION: NORMAL
WBC OTHER # BLD: 14.7 K/UL (ref 3.5–11.3)

## 2024-05-14 PROCEDURE — 6370000000 HC RX 637 (ALT 250 FOR IP): Performed by: NURSE PRACTITIONER

## 2024-05-14 PROCEDURE — 87070 CULTURE OTHR SPECIMN AEROBIC: CPT

## 2024-05-14 PROCEDURE — 80048 BASIC METABOLIC PNL TOTAL CA: CPT

## 2024-05-14 PROCEDURE — 87205 SMEAR GRAM STAIN: CPT

## 2024-05-14 PROCEDURE — 0T9030Z DRAINAGE OF RIGHT KIDNEY WITH DRAINAGE DEVICE, PERCUTANEOUS APPROACH: ICD-10-PCS | Performed by: RADIOLOGY

## 2024-05-14 PROCEDURE — 6360000004 HC RX CONTRAST MEDICATION: Performed by: STUDENT IN AN ORGANIZED HEALTH CARE EDUCATION/TRAINING PROGRAM

## 2024-05-14 PROCEDURE — 99152 MOD SED SAME PHYS/QHP 5/>YRS: CPT

## 2024-05-14 PROCEDURE — 50432 PLMT NEPHROSTOMY CATHETER: CPT

## 2024-05-14 PROCEDURE — 99232 SBSQ HOSP IP/OBS MODERATE 35: CPT | Performed by: NURSE PRACTITIONER

## 2024-05-14 PROCEDURE — BT1D1ZZ FLUOROSCOPY OF RIGHT KIDNEY, URETER AND BLADDER USING LOW OSMOLAR CONTRAST: ICD-10-PCS | Performed by: RADIOLOGY

## 2024-05-14 PROCEDURE — 85027 COMPLETE CBC AUTOMATED: CPT

## 2024-05-14 PROCEDURE — 1200000000 HC SEMI PRIVATE

## 2024-05-14 PROCEDURE — 6360000002 HC RX W HCPCS: Performed by: NURSE PRACTITIONER

## 2024-05-14 PROCEDURE — C1769 GUIDE WIRE: HCPCS

## 2024-05-14 PROCEDURE — 87086 URINE CULTURE/COLONY COUNT: CPT

## 2024-05-14 PROCEDURE — 87075 CULTR BACTERIA EXCEPT BLOOD: CPT

## 2024-05-14 PROCEDURE — 2580000003 HC RX 258: Performed by: NURSE PRACTITIONER

## 2024-05-14 PROCEDURE — 36415 COLL VENOUS BLD VENIPUNCTURE: CPT

## 2024-05-14 PROCEDURE — 6360000002 HC RX W HCPCS: Performed by: RADIOLOGY

## 2024-05-14 RX ORDER — UREA 10 %
325 LOTION (ML) TOPICAL 2 TIMES DAILY WITH MEALS
Status: DISCONTINUED | OUTPATIENT
Start: 2024-05-14 | End: 2024-05-15 | Stop reason: HOSPADM

## 2024-05-14 RX ORDER — MIDAZOLAM HYDROCHLORIDE 2 MG/2ML
INJECTION, SOLUTION INTRAMUSCULAR; INTRAVENOUS PRN
Status: COMPLETED | OUTPATIENT
Start: 2024-05-14 | End: 2024-05-14

## 2024-05-14 RX ORDER — FENTANYL CITRATE 50 UG/ML
INJECTION, SOLUTION INTRAMUSCULAR; INTRAVENOUS PRN
Status: COMPLETED | OUTPATIENT
Start: 2024-05-14 | End: 2024-05-14

## 2024-05-14 RX ADMIN — HYDROCODONE BITARTRATE AND ACETAMINOPHEN 2 TABLET: 5; 325 TABLET ORAL at 08:58

## 2024-05-14 RX ADMIN — SODIUM CHLORIDE, PRESERVATIVE FREE 10 ML: 5 INJECTION INTRAVENOUS at 23:24

## 2024-05-14 RX ADMIN — IOHEXOL 23 ML: 240 INJECTION, SOLUTION INTRATHECAL; INTRAVASCULAR; INTRAVENOUS; ORAL at 14:10

## 2024-05-14 RX ADMIN — MIDAZOLAM HYDROCHLORIDE 1 MG: 1 INJECTION, SOLUTION INTRAMUSCULAR; INTRAVENOUS at 13:56

## 2024-05-14 RX ADMIN — HYDROCODONE BITARTRATE AND ACETAMINOPHEN 2 TABLET: 5; 325 TABLET ORAL at 17:01

## 2024-05-14 RX ADMIN — POTASSIUM CHLORIDE 40 MEQ: 1500 TABLET, EXTENDED RELEASE ORAL at 08:58

## 2024-05-14 RX ADMIN — SODIUM CHLORIDE, PRESERVATIVE FREE 10 ML: 5 INJECTION INTRAVENOUS at 09:02

## 2024-05-14 RX ADMIN — FERROUS SULFATE TAB EC 325 MG (65 MG FE EQUIVALENT) 325 MG: 325 (65 FE) TABLET DELAYED RESPONSE at 23:16

## 2024-05-14 RX ADMIN — FLUOXETINE HYDROCHLORIDE 20 MG: 20 CAPSULE ORAL at 08:58

## 2024-05-14 RX ADMIN — Medication 1000 MG: at 04:29

## 2024-05-14 RX ADMIN — HYDROCODONE BITARTRATE AND ACETAMINOPHEN 2 TABLET: 5; 325 TABLET ORAL at 23:10

## 2024-05-14 RX ADMIN — FENTANYL CITRATE 50 MCG: 50 INJECTION, SOLUTION INTRAMUSCULAR; INTRAVENOUS at 13:56

## 2024-05-14 RX ADMIN — FENTANYL CITRATE 50 MCG: 50 INJECTION, SOLUTION INTRAMUSCULAR; INTRAVENOUS at 14:00

## 2024-05-14 RX ADMIN — HYDROCODONE BITARTRATE AND ACETAMINOPHEN 2 TABLET: 5; 325 TABLET ORAL at 04:40

## 2024-05-14 RX ADMIN — CHOLECALCIFEROL TAB 25 MCG (1000 UNIT) 5000 UNITS: 25 TAB at 23:11

## 2024-05-14 RX ADMIN — SODIUM CHLORIDE, PRESERVATIVE FREE 10 ML: 5 INJECTION INTRAVENOUS at 23:25

## 2024-05-14 ASSESSMENT — PAIN SCALES - GENERAL
PAINLEVEL_OUTOF10: 8
PAINLEVEL_OUTOF10: 5
PAINLEVEL_OUTOF10: 8
PAINLEVEL_OUTOF10: 6
PAINLEVEL_OUTOF10: 8
PAINLEVEL_OUTOF10: 4
PAINLEVEL_OUTOF10: 9
PAINLEVEL_OUTOF10: 5

## 2024-05-14 ASSESSMENT — PAIN DESCRIPTION - LOCATION
LOCATION: ABDOMEN;BACK;FLANK
LOCATION: ABDOMEN;FLANK
LOCATION: FLANK
LOCATION: FLANK
LOCATION: ABDOMEN;FLANK

## 2024-05-14 ASSESSMENT — PAIN DESCRIPTION - ORIENTATION
ORIENTATION: RIGHT

## 2024-05-14 ASSESSMENT — PAIN DESCRIPTION - DESCRIPTORS
DESCRIPTORS: CRAMPING
DESCRIPTORS: ACHING;CRAMPING
DESCRIPTORS: THROBBING

## 2024-05-14 NOTE — PROGRESS NOTES
Called to patient's room, Sylvia decided she will have the tube placed during this visited as she does not want to risk a worsening infection/sepsis if she were to be discharged. Urology resident, Dr. Doty, notified of above via [employer].

## 2024-05-14 NOTE — PROGRESS NOTES
evidence of high-grade obstruction. 2.  Normal function of the left kidney. 3.  Split function is 63% on the left and 37% on the right.     US RENAL COMPLETE    Result Date: 5/11/2024  Moderate right hydronephrosis.       Physical Examination:        General appearance:  alert, cooperative and no distress  Mental Status:  oriented to person, place and time and normal affect  Lungs:  clear to auscultation bilaterally, normal effort  Heart:  regular rate and rhythm, no murmur  Abdomen:  soft, nontender, nondistended, normal bowel sounds, no masses, hepatomegaly, splenomegaly  Extremities:  no edema, redness, tenderness in the calves  Skin:  no gross lesions, rashes, induration    Assessment:        Hospital Problems             Last Modified POA    * (Principal) UTI (urinary tract infection) 5/11/2024 Yes    Tobacco use 5/11/2024 Yes    Overview Signed 11/2/2022  4:34 PM by Emilia Bardales DO     Desires Nicotine Patch         Hydronephrosis 5/11/2024 Yes    Overview Signed 2/19/2024 10:37 AM by Ana Donahue DO     Right ureteral stent in place         FREDDY (acute kidney injury) (HCC) 5/11/2024 Yes    Flank pain 5/11/2024 Yes       Plan:        Obstructive uropathy with right sided hydronephrosis. S/P nuclear renal scan showing evidence of high-grade obstruction on the right. Urology following. For IR guided percutaneous nephrostomy tube placement today. PRN pain management.   UTI: On Rocephin. BC 1/2 strep (+). No growth on repeat cultures x 24 hours.   History of iron deficiency anemia: Fe= 16. On Venofer. Add po iron supplementation.   FREDDY: Creatinine persistently 1.2. Voiding well. Continue to trend with daily BMP. Avoid nephrotoxic agents and contrast as able.   Tobacco cessation strongly encouraged. Nicotine patch offered.   Hypokalemia: Replete. Recheck.     JOVON MOSES - HARJEET  5/14/2024  7:43 AM

## 2024-05-14 NOTE — PROGRESS NOTES
[facility], Khan, Mostafa, Buck, Murtagh Jr  Urology Progress Note     Subjective:   Afebrile hemodynamically stable  Pain is well controlled this morning  Creatinine remained stable 1.2 from 1.2  Urine culture negative    Patient Vitals for the past 24 hrs:   BP Temp Temp src Pulse Resp SpO2   05/13/24 2024 111/67 99.2 °F (37.3 °C) Oral (!) 104 16 100 %   05/13/24 1440 -- -- -- -- 14 --         Intake/Output Summary (Last 24 hours) at 5/14/2024 0746  Last data filed at 5/13/2024 2245  Gross per 24 hour   Intake 240 ml   Output --   Net 240 ml         Recent Labs     05/12/24  0657 05/13/24  0552 05/14/24  0706   WBC 16.6* 15.4* 14.7*   HGB 10.0* 9.0* 9.9*   HCT 33.3* 29.5* 32.8*   MCV 88.8 89.4 89.6    265 295       Recent Labs     05/11/24  0751 05/12/24  0657 05/13/24  0552    136 136   K 3.9 3.7 3.7    106 108*   CO2 22 21 19*   BUN 8 7 5*   CREATININE 1.1* 1.2* 1.2*         No results for input(s): \"COLORU\", \"PHUR\", \"LABCAST\", \"WBCUA\", \"RBCUA\", \"MUCUS\", \"TRICHOMONAS\", \"YEAST\", \"BACTERIA\", \"CLARITYU\", \"SPECGRAV\", \"LEUKOCYTESUR\", \"UROBILINOGEN\", \"BILIRUBINUR\", \"BLOODU\" in the last 72 hours.    Invalid input(s): \"NITRATE\", \"GLUCOSEUKETONESUAMORPHOUS\"      Additional Lab/culture results:  Results       Procedure Component Value Units Date/Time    Culture, Blood 1 [[id number]] Collected: 05/12/24 1053    Order Status: Completed Specimen: Blood Updated: 05/13/24 1144     Specimen Description .BLOOD     Special Requests          Culture NO GROWTH 1 DAY    Culture, Blood 1 [[id number]] Collected: 05/12/24 1053    Order Status: Completed Specimen: Blood Updated: 05/13/24 1144     Specimen Description .BLOOD     Special Requests          Culture NO GROWTH 1 DAY    Culture, Blood 1 [[id number]]  (Abnormal) Collected: 05/11/24 0335    Order Status: Completed Specimen: Blood Updated: 05/12/24 1848     Specimen Description .BLOOD     Special Requests RIGHT HAND 10ML     Culture POSITIVE

## 2024-05-14 NOTE — BRIEF OP NOTE
Brief Postoperative Note    Sylvia Weller  YOB: 1980  [id number]    Pre-operative Diagnosis: Hydronephrosis    Post-operative Diagnosis: Same    Procedure: Right nephrostomy tube    Anesthesia: Local and Moderate Sedation    Surgeons/Assistants: MD Alex    Estimated Blood Loss: less than 50     Complications: None    Specimens: Was Obtained:     Findings: Successful placement of 8 fr right nephrostomy tube.     Electronically signed by DARIUSZ Morgan on 5/14/2024 at 2:10 PM

## 2024-05-15 VITALS
SYSTOLIC BLOOD PRESSURE: 120 MMHG | BODY MASS INDEX: 32.33 KG/M2 | HEIGHT: 62 IN | OXYGEN SATURATION: 100 % | DIASTOLIC BLOOD PRESSURE: 86 MMHG | RESPIRATION RATE: 18 BRPM | TEMPERATURE: 98.8 F | HEART RATE: 106 BPM | WEIGHT: 175.71 LBS

## 2024-05-15 LAB
ANION GAP SERPL CALCULATED.3IONS-SCNC: 12 MMOL/L (ref 9–16)
BUN SERPL-MCNC: 9 MG/DL (ref 6–20)
CALCIUM SERPL-MCNC: 8.7 MG/DL (ref 8.6–10.4)
CHLORIDE SERPL-SCNC: 101 MMOL/L (ref 98–107)
CO2 SERPL-SCNC: 19 MMOL/L (ref 20–31)
CREAT SERPL-MCNC: 1 MG/DL (ref 0.5–0.9)
ERYTHROCYTE [DISTWIDTH] IN BLOOD BY AUTOMATED COUNT: 17.1 % (ref 11.8–14.4)
GFR, ESTIMATED: 68 ML/MIN/1.73M2
GLUCOSE SERPL-MCNC: 108 MG/DL (ref 74–99)
HCT VFR BLD AUTO: 35.1 % (ref 36.3–47.1)
HGB BLD-MCNC: 9.9 G/DL (ref 11.9–15.1)
MCH RBC QN AUTO: 27.3 PG (ref 25.2–33.5)
MCHC RBC AUTO-ENTMCNC: 28.2 G/DL (ref 28.4–34.8)
MCV RBC AUTO: 96.7 FL (ref 82.6–102.9)
MICROORGANISM SPEC CULT: ABNORMAL
NRBC BLD-RTO: 0 PER 100 WBC
PLATELET # BLD AUTO: 273 K/UL (ref 138–453)
PMV BLD AUTO: 11.5 FL (ref 8.1–13.5)
POTASSIUM SERPL-SCNC: 3.9 MMOL/L (ref 3.7–5.3)
RBC # BLD AUTO: 3.63 M/UL (ref 3.95–5.11)
SODIUM SERPL-SCNC: 132 MMOL/L (ref 136–145)
SPECIMEN DESCRIPTION: ABNORMAL
WBC OTHER # BLD: 12.1 K/UL (ref 3.5–11.3)

## 2024-05-15 PROCEDURE — 2580000003 HC RX 258: Performed by: NURSE PRACTITIONER

## 2024-05-15 PROCEDURE — 6370000000 HC RX 637 (ALT 250 FOR IP)

## 2024-05-15 PROCEDURE — 99232 SBSQ HOSP IP/OBS MODERATE 35: CPT | Performed by: NURSE PRACTITIONER

## 2024-05-15 PROCEDURE — 6360000002 HC RX W HCPCS: Performed by: NURSE PRACTITIONER

## 2024-05-15 PROCEDURE — 6370000000 HC RX 637 (ALT 250 FOR IP): Performed by: NURSE PRACTITIONER

## 2024-05-15 PROCEDURE — 80048 BASIC METABOLIC PNL TOTAL CA: CPT

## 2024-05-15 PROCEDURE — 36415 COLL VENOUS BLD VENIPUNCTURE: CPT

## 2024-05-15 PROCEDURE — 85027 COMPLETE CBC AUTOMATED: CPT

## 2024-05-15 RX ORDER — HYDROCODONE BITARTRATE AND ACETAMINOPHEN 5; 325 MG/1; MG/1
1 TABLET ORAL EVERY 4 HOURS PRN
Qty: 18 TABLET | Refills: 0 | Status: SHIPPED | OUTPATIENT
Start: 2024-05-15 | End: 2024-05-18

## 2024-05-15 RX ORDER — UREA 10 %
325 LOTION (ML) TOPICAL 2 TIMES DAILY WITH MEALS
Qty: 90 TABLET | Refills: 3 | Status: SHIPPED | OUTPATIENT
Start: 2024-05-15

## 2024-05-15 RX ORDER — BISACODYL 10 MG
10 SUPPOSITORY, RECTAL RECTAL ONCE
Status: COMPLETED | OUTPATIENT
Start: 2024-05-15 | End: 2024-05-15

## 2024-05-15 RX ORDER — SENNOSIDES A AND B 8.6 MG/1
2 TABLET, FILM COATED ORAL DAILY
Qty: 60 TABLET | Refills: 0 | Status: SHIPPED | OUTPATIENT
Start: 2024-05-16 | End: 2024-06-15

## 2024-05-15 RX ADMIN — Medication 1000 MG: at 03:26

## 2024-05-15 RX ADMIN — ONDANSETRON 4 MG: 2 INJECTION INTRAMUSCULAR; INTRAVENOUS at 05:00

## 2024-05-15 RX ADMIN — HYDROCODONE BITARTRATE AND ACETAMINOPHEN 2 TABLET: 5; 325 TABLET ORAL at 08:33

## 2024-05-15 RX ADMIN — SODIUM CHLORIDE, PRESERVATIVE FREE 10 ML: 5 INJECTION INTRAVENOUS at 08:28

## 2024-05-15 RX ADMIN — SENNOSIDES 17.2 MG: 8.6 TABLET, FILM COATED ORAL at 08:25

## 2024-05-15 RX ADMIN — CHOLECALCIFEROL TAB 25 MCG (1000 UNIT) 5000 UNITS: 25 TAB at 08:26

## 2024-05-15 RX ADMIN — FERROUS SULFATE TAB EC 325 MG (65 MG FE EQUIVALENT) 325 MG: 325 (65 FE) TABLET DELAYED RESPONSE at 08:26

## 2024-05-15 RX ADMIN — Medication 1 TABLET: at 08:30

## 2024-05-15 RX ADMIN — FLUOXETINE HYDROCHLORIDE 20 MG: 20 CAPSULE ORAL at 08:30

## 2024-05-15 RX ADMIN — BISACODYL 10 MG: 10 SUPPOSITORY RECTAL at 08:25

## 2024-05-15 ASSESSMENT — PAIN DESCRIPTION - ORIENTATION
ORIENTATION: RIGHT

## 2024-05-15 ASSESSMENT — PAIN SCALES - GENERAL
PAINLEVEL_OUTOF10: 5
PAINLEVEL_OUTOF10: 6
PAINLEVEL_OUTOF10: 9
PAINLEVEL_OUTOF10: 9
PAINLEVEL_OUTOF10: 5

## 2024-05-15 ASSESSMENT — PAIN DESCRIPTION - LOCATION
LOCATION: BACK
LOCATION: ABDOMEN;FLANK
LOCATION: BACK

## 2024-05-15 ASSESSMENT — PAIN - FUNCTIONAL ASSESSMENT: PAIN_FUNCTIONAL_ASSESSMENT: PREVENTS OR INTERFERES SOME ACTIVE ACTIVITIES AND ADLS

## 2024-05-15 ASSESSMENT — PAIN DESCRIPTION - DESCRIPTORS
DESCRIPTORS: ACHING
DESCRIPTORS: ACHING
DESCRIPTORS: CRAMPING;ACHING

## 2024-05-15 NOTE — PROGRESS NOTES
Discharge instructions reviewed with pt, verbalized understanding. No further needs at this time. Electronically signed by Ruby Vaughan RN on 5/15/2024 at 2:31 PM

## 2024-05-15 NOTE — PROGRESS NOTES
[facility]  Office: [phone]  Deonte Aguirre DO, Dmitry Sibley DO, Leonel Cabrera DO, Karlo Florian DO, Melanie Mancia MD, Kathy Connell MD, Sammie Ruiz MD, Keyona Santos MD,  Oli Cervantes MD, Clare Montesinos MD, Jennifer Johnson MD,  Tomer Rowley DO, Zora Branch MD, Joe Parker MD, Chano Aguirre DO, Vidhya Das MD,  Dallas Mares DO, Verena Loomis MD, Flor Krueger MD, Alyssa Zuleta MD, Daksha Cordon MD,  Ildefonso Siddiqui MD, Jose Rivera MD, Liyah Cao MD, Diana Mojica MD, Kishor Hoyos MD, Yoly Meza MD, Gabo Sarkar DO, Asa Baez DO, Jaciel Waterman MD,  Eliot Prakash MD, Shirley Waterhouse, CNP,  Rosalina Sagastume CNP, Urban Musa, CNP,  Geraldine Steward, DNP, Laura Bucio, CNP, Safia Davis, CNP, Sandrine Milian CNP, Milly Jeffries CNP, Damaris Ross, PA-C, Pati Curry PA-C, Prerna Villalba, CNP, Milly Stahl, CNP, Nayla Rivera, CNP, Safia Scott, CNP, Beti Frank, CNP, Ana Rosa Lopes, CNS, Yuli Cardona, CNP, Paulina Jackson CNP, Tracy Schwab, CNP         [facility]   IN-PATIENT SERVICE   [facility]    Progress Note    5/15/2024    8:05 AM    Name:   Sylvia Weller  MRN:     [id number]     Acct:      [id number]   Room:   [address] [place] [address] Day:  4  Admit Date:  5/11/2024 12:00 AM    PCP:   Sachi Martel PA  Code Status:  Full Code    Subjective:     C/C:   Chief Complaint   Patient presents with    Flank Pain     R sided; had stent removed today     Interval History Status: improved.     Seen at bedside. Complaining of feeling unwell this morning with headache and nausea. Right nephrostomy tube placed 5/14. Draining well. Less flank pain today. Creatinine improved to 1.0 (1.2). Discussed discharge home today.     Brief History:     Admitted with previous right urethral injury after abdominal hysterectomy resulting in urethral strictures.  Status post PCN tubes and stent placement.  Stent removed on

## 2024-05-15 NOTE — PROGRESS NOTES
[facility], Khan, Mostafa, Buck, Murtagh Jr  Urology Progress Note     Subjective:   Afebrile hemodynamically stable  Pain is well controlled this morning  Creatinine remained stable 1.0  PNT draining well   Urine culture negative    Patient Vitals for the past 24 hrs:   BP Temp Temp src Pulse Resp SpO2   05/15/24 0330 130/87 98.1 °F (36.7 °C) Oral (!) 104 18 96 %   05/15/24 0030 (!) 100/53 -- -- (!) 103 18 --   05/14/24 2340 (!) 145/96 98.4 °F (36.9 °C) Oral (!) 106 18 --   05/14/24 1731 -- -- -- -- 16 --   05/14/24 1430 -- -- -- -- 16 --   05/14/24 1406 (!) 133/90 -- -- (!) 106 13 98 %   05/14/24 1404 (!) 131/57 -- -- (!) 103 (!) 9 97 %   05/14/24 1357 (!) 109/93 -- -- (!) 104 10 100 %   05/14/24 1337 118/73 -- -- 98 16 100 %   05/14/24 0928 -- -- -- -- 16 --   05/14/24 0815 (!) 135/92 98 °F (36.7 °C) Oral 91 17 96 %         Intake/Output Summary (Last 24 hours) at 5/15/2024 0657  Last data filed at 5/15/2024 0509  Gross per 24 hour   Intake 1020 ml   Output 825 ml   Net 195 ml         Recent Labs     05/12/24  0657 05/13/24  0552 05/14/24  0706   WBC 16.6* 15.4* 14.7*   HGB 10.0* 9.0* 9.9*   HCT 33.3* 29.5* 32.8*   MCV 88.8 89.4 89.6    265 295       Recent Labs     05/13/24  0552 05/14/24  0706 05/15/24  0614    135* 132*   K 3.7 3.2* 3.9   * 101 101   CO2 19* 23 19*   BUN 5* 8 9   CREATININE 1.2* 1.2* 1.0*         No results for input(s): \"COLORU\", \"PHUR\", \"LABCAST\", \"WBCUA\", \"RBCUA\", \"MUCUS\", \"TRICHOMONAS\", \"YEAST\", \"BACTERIA\", \"CLARITYU\", \"SPECGRAV\", \"LEUKOCYTESUR\", \"UROBILINOGEN\", \"BILIRUBINUR\", \"BLOODU\" in the last 72 hours.    Invalid input(s): \"NITRATE\", \"GLUCOSEUKETONESUAMORPHOUS\"      Additional Lab/culture results:  Results       Procedure Component Value Units Date/Time    Culture, Anaerobic and Aerobic [[id number]] Collected: 05/14/24 1730    Order Status: Completed Specimen: Urine, Nephrostomy Updated: 05/14/24 1739     Specimen Description .URINE NEPHROSTOMY      Culture DUE TO THE SPECIMEN TYPE, THE ORDER WAS CANCELED AND REORDERED. PLEASE REFER TO: URINE CULTURE    Culture, Urine [[id number]] Collected: 05/14/24 1730    Order Status: No result Specimen: Other Updated: 05/14/24 1735    Culture, Blood 1 [[id number]] Collected: 05/12/24 1053    Order Status: Completed Specimen: Blood Updated: 05/14/24 1150     Specimen Description .BLOOD     Special Requests          Culture NO GROWTH 2 DAYS    Culture, Blood 1 [[id number]] Collected: 05/12/24 1053    Order Status: Completed Specimen: Blood Updated: 05/14/24 1156     Specimen Description .BLOOD     Special Requests          Culture NO GROWTH 2 DAYS    Culture, Blood 1 [[id number]]  (Abnormal) Collected: 05/11/24 0335    Order Status: Completed Specimen: Blood Updated: 05/12/24 1848     Specimen Description .BLOOD     Special Requests RIGHT HAND 10ML     Culture POSITIVE Blood Culture      DIRECT GRAM STAIN FROM BOTTLE: GRAM POSITIVE COCCI IN CHAINS      Streptococcus agalactiae (Group B) Detected: Methodology- Polymerase Chain Reaction (PCR)      STREPTOCOCCUS AGALACTIAE (GROUP B)      (NOTE) Direct Gram Stain from bottle and Polymerase Chain Reaction (PCR) results called to and read back by: ALEX MARTINEZ RN AT 2145 ON 05/11/24.    Culture, Blood 1 [[id number]] Collected: 05/11/24 0334    Order Status: Completed Specimen: Blood Updated: 05/15/24 0441     Specimen Description .BLOOD     Special Requests LEFT FOREARM 10ML     Culture NO GROWTH 4 DAYS    Culture, Urine [[id number]] Collected: 05/11/24 0223    Order Status: Completed Specimen: Urine Updated: 05/12/24 0710     Specimen Description .URINE     Culture NO GROWTH            Physical Exam:   No acute distress  Regular rate and rhythm  Symmetric chest rise normal work of breathing  Soft nontender nondistended R PNT draining  Voiding    Interval Imaging Findings:  NM RENAL WITH LASIX    Result Date: 5/13/2024  EXAMINATION: NUCLEAR MEDICINE RENAL SCAN 5/13/2024 TECHNIQUE:

## 2024-05-15 NOTE — PROGRESS NOTES
Physician Progress Note      PATIENT:               LU COLE  [facility] #:                  [id number]  :                       1980  ADMIT DATE:       2024 12:00 AM  DISCH DATE:  RESPONDING  PROVIDER #:        MARIBELL DOWNS APRN - NP          QUERY TEXT:    Patient admitted  on  with obstructive uropathy with rt sided   hydronephrosis, noted to have UTI documented in H&P on . UA on admission   shows few bacteria and small leukocytes, Urine culture shows no growth on   . Please clarify one of the following:    The medical record reflects the following:  Risk Factors: ureteral stricture with injury s/p ureteral stent placement and   removal  Clinical Indicators: admitted  on  with obstructive uropathy with rt sided   hydronephrosis, noted to have UTI documented in H&P on . UA on admission   shows few bacteria and small leukocytes, Urine culture shows no growth on   Treatment: UA, urine culture, Urology consult, IV Rocephin    Thank You Aaron LINCOLN BSN CCDS  Email [email or web address]  Cell [phone]  office hours M-F 6am to 2:30p  Options provided:  -- UTI despite negative culture  -- UTI ruled out after study  -- Other - I will add my own diagnosis  -- Disagree - Not applicable / Not valid  -- Disagree - Clinically unable to determine / Unknown  -- Refer to Clinical Documentation Reviewer    PROVIDER RESPONSE TEXT:    This patients UTI has been ruled out after study    Query created by: Shereen Gray on 2024 9:39 AM      QUERY TEXT:    Patient admitted on   with obstructive uropathy with rt sided   hydronephrosis. Noted documentation of Acute Kidney Injury in H&P and progress   notes on ..  In order to support the diagnosis of FREDDY, please include   additional clinical indicators in your documentation.? Or please document if   the diagnosis of FREDDY has been ruled out after further study.    The medical record reflects the following:  Risk Factors: obstructive

## 2024-05-16 LAB
MICROORGANISM SPEC CULT: NORMAL
SERVICE CMNT-IMP: NORMAL
SPECIMEN DESCRIPTION: NORMAL

## 2024-05-16 NOTE — PROGRESS NOTES
CLINICAL PHARMACY NOTE: MEDS TO BEDS    Total # of Prescriptions Filled: 0   The following medications were delivered to the patient:  Hydrocodone/APAP 5-325mg    Additional Documentation:  Delivered to patient at 3:12pm 5/15, paid $9.81 with [employer]. AMBERLY

## 2024-05-17 LAB
MICROORGANISM SPEC CULT: NORMAL
MICROORGANISM SPEC CULT: NORMAL
SERVICE CMNT-IMP: NORMAL
SERVICE CMNT-IMP: NORMAL
SPECIMEN DESCRIPTION: NORMAL
SPECIMEN DESCRIPTION: NORMAL

## 2024-05-17 NOTE — DISCHARGE SUMMARY
[facility]  Office: [phone]  Deonte Aguirre DO, Dmitry Sibley DO, Leonel Cabrera DO, Karlo Florian DO, Melanie Mancia MD, Kathy Connell MD, Sammie Ruiz MD, Keyona Santos MD,  Oli Cervantes MD, Clare Montesinos MD, Jennifer Johnson MD,  Tomer Rowley DO, oZra Branch MD, Joe Parker MD, Chano Aguirre DO, Vidhya Das MD,  Dallas Mares DO, Verena Loomis MD, Flor Krueger MD, Alyssa Zuleta MD, Daksha Cordon MD,  Ildefonso Siddiqui MD, Jose Rivera MD, Liyah Cao MD, Diana Mojica MD, Kishor Hoyos MD, Yoly Meza MD, Gabo Sarkar DO, Asa Baez DO, Jaciel Waterman MD,  Eliot Prakash MD, Shirley Waterhouse, CNP,  Rosalina Sagastume CNP, Urban Musa, CNP,  Geraldine Steward, HANNA, Luara Bucio, FELY, Maribell Davis, FELY, Sandrine Milian CNP, Milly Jeffries, CNP, Damaris Ross PA-C, Pati Curry PA-C, Prerna Villalba, CNP, Milly Stahl, CNP, Nayla Rivera, CNP, Safia Scott, CNP, Beti Frank, CNP, Ana Rosa Lopes, CNS, Yuli Cardona, CNP, Paulina Jackson CNP, Tracy Schwab, CNP         [facility]   IN-PATIENT SERVICE   [facility]    Discharge Summary     Patient ID: Sylvia Weller  :  1980   MRN: [id number]     ACCOUNT:  [id number]   Patient's PCP: Sachi Martel PA  Admit Date: 2024   Discharge Date: 5/15/2024  Length of Stay: 4  Code Status:  Prior  Admitting Physician: No admitting provider for patient encounter.  Discharge Physician: MARIBELL R DAVIS, APRN - NP     Active Discharge Diagnoses:     Hospital Problem Lists:  Principal Problem:    UTI (urinary tract infection)  Active Problems:    Tobacco use    Hydronephrosis    FREDDY (acute kidney injury) (HCC)    Flank pain    Hypokalemia  Resolved Problems:    * No resolved hospital problems. *      Admission Condition:  serious     Discharged Condition: stable    Hospital Stay:     Hospital Course:  Sylvia Weller is a 43 y.o. female who was admitted for the management of    UTI (urinary tract infection) , presented to ER with Flank Pain (R sided; had stent removed today)    Admitted with previous right urethral injury after abdominal hysterectomy resulting in urethral strictures.  Status post PCN tubes and stent placement.  Stent removed on 5/10/2024 however this was not successful in alleviating hydronephrosis.  Patient readmitted with FREDDY and flank pain.  Urologic plan if the patient has continued elevation of creatinine and persistent right flank pain we will plan for the patient to undergo a right percutaneous nephrostomy tube placement and plan for eventual long-term robotic assisted distal ureteral reimplant due to persistent stricture and delayed emptying on the right-hand side with right hydronephrosis.      Stent removed, creatinine essentially unchanged  Pt to undergo a NM renal scan tmrw and if persistent delayed emptying and obstruction we will plan for a right percutaneous nephrostomy tube placement preferably, unless pt would really prefer a stent  and plan for eventual long-term robotic assisted distal ureteral reimplant due to persistent stricture and delayed emptying on the right-hand side with right hydronephrosis      5/13/2024:   Nuclear renal scan:   1.  Diminished function of the right kidney with evidence of high-grade obstruction.     2. Normal function of the left kidney.     3.  Split function is 63% on the left and 37% on the right.    5/14/2024: Placement right nephrostomy tube.       Significant therapeutic interventions:   Obstructive uropathy with right sided hydronephrosis. S/P nuclear renal scan showing evidence of high-grade obstruction on the right. Urology following. IR guided percutaneous nephrostomy tube placement 5/14. PRN pain management.   UTI: On Rocephin. BC 1/2 strep (+). No growth on repeat cultures x 24 hours.   History of iron deficiency anemia: Fe= 16. On Venofer. Add po iron supplementation.   FREDDY: Creatinine improved today to 1.0.

## 2024-05-20 NOTE — PROGRESS NOTES
Physician Progress Note      PATIENT:               LU COEL  [facility] #:                  [id number]  :                       1980  ADMIT DATE:       2024 12:00 AM  DISCH DATE:        5/15/2024 3:25 PM  RESPONDING  PROVIDER #:        RIK Zambrano          QUERY TEXT:    Pt admitted with Flank pain-UTI. Pt noted to have Hx ureteral stricture with   ureteral stent in place. If possible, please document in progress notes and   discharge summary the relationship, if any, between  Ureteral stricture and   Ureteral stent.    The medical record reflects the following:  Risk Factors: UTI, hydronephrosis  Clinical Indicators: 42 yo F  admitted with persistent with ureteral   stricture, history of distal ureteral stricture on 2024 retrograde   pyelogram and ureteroscopy. She underwent ureteral balloon dilation and on   re-evaluation in OR, she was found to have persistent stricture, so stent was   replaced on 24.  S/p cystoscopy, stent removal on Right 5/10/24.   Urology Consult note-'    43-year-old female Right ureteral stricture secondary to ureteral injury   status post abdominal hysterectomy  Status post multiple endoscopic dilations   and stent placements and postop day 1 from right stent removal. '  Treatment: Cystoscopy, Right retrograde pyelogram, Right stent removal    Thank you,  [email or web address]  office hours  m-f 7-3  Options provided:  -- Ureteral Stricture due to Ureteral stent  -- Ureteral Stricture unrelated to Ureteral stent  -- Other - I will add my own diagnosis  -- Disagree - Not applicable / Not valid  -- Disagree - Clinically unable to determine / Unknown  -- Refer to Clinical Documentation Reviewer    PROVIDER RESPONSE TEXT:    This patient had Ureteral Stricture, unrelated to Ureteral stent.    Query created by: Karen Garcia on 2024 2:09 PM      Electronically signed by:  RIK Zambrano 2024 2:42 PM

## 2024-05-24 ENCOUNTER — OFFICE VISIT (OUTPATIENT)
Dept: UROLOGY | Age: 44
End: 2024-05-24
Payer: COMMERCIAL

## 2024-05-24 VITALS
HEIGHT: 62 IN | BODY MASS INDEX: 32.2 KG/M2 | WEIGHT: 175 LBS | HEART RATE: 110 BPM | SYSTOLIC BLOOD PRESSURE: 138 MMHG | DIASTOLIC BLOOD PRESSURE: 94 MMHG

## 2024-05-24 DIAGNOSIS — N13.5 OBSTRUCTION OF RIGHT URETER: ICD-10-CM

## 2024-05-24 DIAGNOSIS — N13.1 HYDRONEPHROSIS WITH URETERAL STRICTURE, NOT ELSEWHERE CLASSIFIED: Primary | ICD-10-CM

## 2024-05-24 PROCEDURE — 1111F DSCHRG MED/CURRENT MED MERGE: CPT | Performed by: UROLOGY

## 2024-05-24 PROCEDURE — G8427 DOCREV CUR MEDS BY ELIG CLIN: HCPCS | Performed by: UROLOGY

## 2024-05-24 PROCEDURE — G8417 CALC BMI ABV UP PARAM F/U: HCPCS | Performed by: UROLOGY

## 2024-05-24 PROCEDURE — 4004F PT TOBACCO SCREEN RCVD TLK: CPT | Performed by: UROLOGY

## 2024-05-24 PROCEDURE — 99214 OFFICE O/P EST MOD 30 MIN: CPT | Performed by: UROLOGY

## 2024-05-24 NOTE — PROGRESS NOTES
Yes     Frequency: 3.0 times per week     Types: Marijuana (Weed)    Sexual activity: Not Currently   Other Topics Concern    Not on file   Social History Narrative    Not on file     Social Determinants of Health     Financial Resource Strain: Low Risk  (8/1/2022)    Overall Financial Resource Strain (CARDIA)     Difficulty of Paying Living Expenses: Not hard at all   Food Insecurity: Food Insecurity Present (8/1/2022)    Hunger Vital Sign     Worried About Running Out of Food in the Last Year: Sometimes true     Ran Out of Food in the Last Year: Sometimes true   Transportation Needs: Not on file   Physical Activity: Not on file   Stress: Not on file   Social Connections: Not on file   Intimate Partner Violence: Not on file   Housing Stability: Not on file       Family History:    Family History   Problem Relation Age of Onset    Other Mother         possible ovarian Ca 8/03/23 pending pending bx    Hypertension Mother     COPD Mother         smoker    Arthritis Mother     Cirrhosis Father     Alcohol Abuse Father     Other Sister         blood disorder, low RBC ct    Asthma Brother     Inflam Bowel Dis Brother     No Known Problems Brother     No Known Problems Brother     Heart Disease Maternal Grandmother     Heart Failure Maternal Grandmother     Cancer Maternal Grandfather     No Known Problems Paternal Grandmother     No Known Problems Paternal Grandfather        REVIEW OF SYSTEMS:  A comprehensive 14 point review of systems was obtained.  Constitutional: No fatigue  Eyes: No blurry vision  Ears, nose, mouth, throat, face: No ringing in the ears; no facial droop.  Respiratory: No cough or cold.  Cardiovascular: No palpitations  Gastrointestinal: No diarrhea or constipation.  Genitourinary: No burning with urination  Integument/Skin: No rashes  Hematologic/Lymphatic: No easy bruising  Musculoskeletal: No muscle pains  Neurologic: No weakness in the extremities.   Psychiatric: No depression or suicidal

## 2024-05-29 ENCOUNTER — TELEPHONE (OUTPATIENT)
Dept: UROLOGY | Age: 44
End: 2024-05-29

## 2024-05-29 NOTE — TELEPHONE ENCOUNTER
Patient is scheduled for surgery on 6/13 at 1:00 pm.  Talked to patient and gave her the date, time and instructions.  Patient confirmed and verbalized understanding.  Letter mailed out today.

## 2024-06-08 ENCOUNTER — HOSPITAL ENCOUNTER (EMERGENCY)
Age: 44
Discharge: HOME | End: 2024-06-08
Attending: EMERGENCY MEDICINE
Payer: COMMERCIAL

## 2024-06-08 ENCOUNTER — APPOINTMENT (OUTPATIENT)
Dept: INTERVENTIONAL RADIOLOGY/VASCULAR | Age: 44
End: 2024-06-08
Payer: COMMERCIAL

## 2024-06-08 ENCOUNTER — APPOINTMENT (OUTPATIENT)
Dept: CT IMAGING | Age: 44
End: 2024-06-08
Payer: COMMERCIAL

## 2024-06-08 VITALS
SYSTOLIC BLOOD PRESSURE: 117 MMHG | WEIGHT: 174 LBS | BODY MASS INDEX: 31.83 KG/M2 | OXYGEN SATURATION: 100 % | DIASTOLIC BLOOD PRESSURE: 98 MMHG | TEMPERATURE: 97.5 F | HEART RATE: 70 BPM | RESPIRATION RATE: 16 BRPM

## 2024-06-08 DIAGNOSIS — N99.528 NEPHROSTOMY COMPLICATION (HCC): ICD-10-CM

## 2024-06-08 DIAGNOSIS — R10.9 FLANK PAIN: Primary | ICD-10-CM

## 2024-06-08 LAB
ANION GAP SERPL CALCULATED.3IONS-SCNC: 12 MMOL/L (ref 9–16)
BACTERIA URNS QL MICRO: ABNORMAL
BASOPHILS # BLD: 0.04 K/UL (ref 0–0.2)
BASOPHILS NFR BLD: 1 % (ref 0–2)
BILIRUB UR QL STRIP: NEGATIVE
BUN SERPL-MCNC: 13 MG/DL (ref 6–20)
CALCIUM SERPL-MCNC: 8.3 MG/DL (ref 8.6–10.4)
CASTS #/AREA URNS LPF: ABNORMAL /LPF (ref 0–8)
CHLORIDE SERPL-SCNC: 103 MMOL/L (ref 98–107)
CLARITY UR: ABNORMAL
CO2 SERPL-SCNC: 24 MMOL/L (ref 20–31)
COLOR UR: ABNORMAL
CREAT SERPL-MCNC: 0.8 MG/DL (ref 0.5–0.9)
EOSINOPHIL # BLD: 0.24 K/UL (ref 0–0.44)
EOSINOPHILS RELATIVE PERCENT: 3 % (ref 1–4)
EPI CELLS #/AREA URNS HPF: ABNORMAL /HPF (ref 0–5)
ERYTHROCYTE [DISTWIDTH] IN BLOOD BY AUTOMATED COUNT: 16.7 % (ref 11.8–14.4)
GFR, ESTIMATED: 88 ML/MIN/1.73M2
GLUCOSE SERPL-MCNC: 112 MG/DL (ref 74–99)
GLUCOSE UR STRIP-MCNC: NEGATIVE MG/DL
HCT VFR BLD AUTO: 36.9 % (ref 36.3–47.1)
HGB BLD-MCNC: 11.4 G/DL (ref 11.9–15.1)
HGB UR QL STRIP.AUTO: ABNORMAL
IMM GRANULOCYTES # BLD AUTO: <0.03 K/UL (ref 0–0.3)
IMM GRANULOCYTES NFR BLD: 0 %
KETONES UR STRIP-MCNC: NEGATIVE MG/DL
LEUKOCYTE ESTERASE UR QL STRIP: ABNORMAL
LYMPHOCYTES NFR BLD: 3.23 K/UL (ref 1.1–3.7)
LYMPHOCYTES RELATIVE PERCENT: 39 % (ref 24–43)
MCH RBC QN AUTO: 27.9 PG (ref 25.2–33.5)
MCHC RBC AUTO-ENTMCNC: 30.9 G/DL (ref 28.4–34.8)
MCV RBC AUTO: 90.4 FL (ref 82.6–102.9)
MONOCYTES NFR BLD: 0.73 K/UL (ref 0.1–1.2)
MONOCYTES NFR BLD: 9 % (ref 3–12)
NEUTROPHILS NFR BLD: 48 % (ref 36–65)
NEUTS SEG NFR BLD: 4.02 K/UL (ref 1.5–8.1)
NITRITE UR QL STRIP: NEGATIVE
NRBC BLD-RTO: 0 PER 100 WBC
PH UR STRIP: 6.5 [PH] (ref 5–8)
PLATELET # BLD AUTO: 353 K/UL (ref 138–453)
PMV BLD AUTO: 10.8 FL (ref 8.1–13.5)
POTASSIUM SERPL-SCNC: 3.7 MMOL/L (ref 3.7–5.3)
PROT UR STRIP-MCNC: ABNORMAL MG/DL
RBC # BLD AUTO: 4.08 M/UL (ref 3.95–5.11)
RBC # BLD: ABNORMAL 10*6/UL
RBC #/AREA URNS HPF: ABNORMAL /HPF (ref 0–4)
SODIUM SERPL-SCNC: 139 MMOL/L (ref 136–145)
SP GR UR STRIP: 1.01 (ref 1–1.03)
UROBILINOGEN UR STRIP-ACNC: NORMAL EU/DL (ref 0–1)
WBC #/AREA URNS HPF: ABNORMAL /HPF (ref 0–5)
WBC OTHER # BLD: 8.3 K/UL (ref 3.5–11.3)

## 2024-06-08 PROCEDURE — 6370000000 HC RX 637 (ALT 250 FOR IP)

## 2024-06-08 PROCEDURE — C1729 CATH, DRAINAGE: HCPCS

## 2024-06-08 PROCEDURE — 96374 THER/PROPH/DIAG INJ IV PUSH: CPT

## 2024-06-08 PROCEDURE — 6360000004 HC RX CONTRAST MEDICATION: Performed by: EMERGENCY MEDICINE

## 2024-06-08 PROCEDURE — 99285 EMERGENCY DEPT VISIT HI MDM: CPT

## 2024-06-08 PROCEDURE — 50435 EXCHANGE NEPHROSTOMY CATH: CPT

## 2024-06-08 PROCEDURE — 81001 URINALYSIS AUTO W/SCOPE: CPT

## 2024-06-08 PROCEDURE — 85025 COMPLETE CBC W/AUTO DIFF WBC: CPT

## 2024-06-08 PROCEDURE — 6360000002 HC RX W HCPCS

## 2024-06-08 PROCEDURE — 96375 TX/PRO/DX INJ NEW DRUG ADDON: CPT

## 2024-06-08 PROCEDURE — 87086 URINE CULTURE/COLONY COUNT: CPT

## 2024-06-08 PROCEDURE — 87077 CULTURE AEROBIC IDENTIFY: CPT

## 2024-06-08 PROCEDURE — 87186 SC STD MICRODIL/AGAR DIL: CPT

## 2024-06-08 PROCEDURE — 80048 BASIC METABOLIC PNL TOTAL CA: CPT

## 2024-06-08 PROCEDURE — 74176 CT ABD & PELVIS W/O CONTRAST: CPT

## 2024-06-08 RX ORDER — MORPHINE SULFATE 4 MG/ML
4 INJECTION INTRAVENOUS ONCE
Status: COMPLETED | OUTPATIENT
Start: 2024-06-08 | End: 2024-06-08

## 2024-06-08 RX ORDER — FENTANYL CITRATE 50 UG/ML
25 INJECTION, SOLUTION INTRAMUSCULAR; INTRAVENOUS ONCE
Status: COMPLETED | OUTPATIENT
Start: 2024-06-08 | End: 2024-06-08

## 2024-06-08 RX ORDER — OXYCODONE HYDROCHLORIDE 5 MG/1
5 TABLET ORAL
Status: COMPLETED | OUTPATIENT
Start: 2024-06-08 | End: 2024-06-08

## 2024-06-08 RX ORDER — ONDANSETRON 2 MG/ML
4 INJECTION INTRAMUSCULAR; INTRAVENOUS ONCE
Status: COMPLETED | OUTPATIENT
Start: 2024-06-08 | End: 2024-06-08

## 2024-06-08 RX ORDER — KETOROLAC TROMETHAMINE 15 MG/ML
30 INJECTION, SOLUTION INTRAMUSCULAR; INTRAVENOUS ONCE
Status: COMPLETED | OUTPATIENT
Start: 2024-06-08 | End: 2024-06-08

## 2024-06-08 RX ORDER — OXYCODONE HYDROCHLORIDE 5 MG/1
5 TABLET ORAL EVERY 6 HOURS PRN
Qty: 12 TABLET | Refills: 0 | Status: SHIPPED | OUTPATIENT
Start: 2024-06-08 | End: 2024-06-11

## 2024-06-08 RX ORDER — FLUCONAZOLE 100 MG/1
200 TABLET ORAL DAILY
Qty: 10 TABLET | Refills: 0 | Status: ON HOLD | OUTPATIENT
Start: 2024-06-08 | End: 2024-06-14 | Stop reason: HOSPADM

## 2024-06-08 RX ORDER — CEFADROXIL 500 MG/1
500 CAPSULE ORAL 2 TIMES DAILY
Qty: 6 CAPSULE | Refills: 0 | Status: SHIPPED | OUTPATIENT
Start: 2024-06-08 | End: 2024-06-11

## 2024-06-08 RX ADMIN — IOHEXOL 10 ML: 240 INJECTION, SOLUTION INTRATHECAL; INTRAVASCULAR; INTRAVENOUS; ORAL at 09:24

## 2024-06-08 RX ADMIN — OXYCODONE HYDROCHLORIDE 5 MG: 5 TABLET ORAL at 10:06

## 2024-06-08 RX ADMIN — FENTANYL CITRATE 25 MCG: 50 INJECTION, SOLUTION INTRAMUSCULAR; INTRAVENOUS at 04:30

## 2024-06-08 RX ADMIN — MORPHINE SULFATE 4 MG: 4 INJECTION INTRAVENOUS at 05:20

## 2024-06-08 RX ADMIN — ONDANSETRON 4 MG: 2 INJECTION INTRAMUSCULAR; INTRAVENOUS at 04:41

## 2024-06-08 RX ADMIN — KETOROLAC TROMETHAMINE 30 MG: 15 INJECTION, SOLUTION INTRAMUSCULAR; INTRAVENOUS at 05:18

## 2024-06-08 ASSESSMENT — ENCOUNTER SYMPTOMS
ABDOMINAL PAIN: 0
BACK PAIN: 1
SHORTNESS OF BREATH: 0

## 2024-06-08 ASSESSMENT — PAIN SCALES - GENERAL: PAINLEVEL_OUTOF10: 6

## 2024-06-08 ASSESSMENT — PAIN - FUNCTIONAL ASSESSMENT
PAIN_FUNCTIONAL_ASSESSMENT: NONE - DENIES PAIN
PAIN_FUNCTIONAL_ASSESSMENT: 0-10

## 2024-06-08 NOTE — ED PROVIDER NOTES
[facility] ED  Emergency Department  Emergency Medicine Resident Turn-Over     Note Started: 7:17 AM EDT    Care of Sylvia Weller was assumed from Dr. Gaston and is being seen for Flank Pain (nephrostomy tube possible infection, decreased urine output)  .  The patient's initial evaluation and plan have been discussed with the prior provider who initially evaluated the patient.     EMERGENCY DEPARTMENT COURSE / MEDICAL DECISION MAKING:       MEDICATIONS GIVEN:  Orders Placed This Encounter   Medications    fentaNYL (SUBLIMAZE) injection 25 mcg    ondansetron (ZOFRAN) injection 4 mg    ketorolac (TORADOL) injection 30 mg    morphine injection 4 mg    iohexol (OMNIPAQUE 240) Oral 50 mL    oxyCODONE (ROXICODONE) immediate release tablet 5 mg    cefadroxil (DURICEF) 500 MG capsule     Sig: Take 1 capsule by mouth 2 times daily for 3 days     Dispense:  6 capsule     Refill:  0    fluconazole (DIFLUCAN) 100 MG tablet     Sig: Take 2 tablets by mouth daily for 5 days     Dispense:  10 tablet     Refill:  0    oxyCODONE (ROXICODONE) 5 MG immediate release tablet     Sig: Take 1 tablet by mouth every 6 hours as needed for Pain for up to 3 days. Intended supply: 3 days. Take lowest dose possible to manage pain Max Daily Amount: 20 mg     Dispense:  12 tablet     Refill:  0       LABS / RADIOLOGY:     Labs Reviewed   CBC WITH AUTO DIFFERENTIAL - Abnormal; Notable for the following components:       Result Value    Hemoglobin 11.4 (*)     RDW 16.7 (*)     All other components within normal limits   BASIC METABOLIC PANEL - Abnormal; Notable for the following components:    Glucose 112 (*)     Calcium 8.3 (*)     All other components within normal limits   URINALYSIS WITH REFLEX TO CULTURE - Abnormal; Notable for the following components:    Color, UA Orange (*)     Turbidity UA Turbid (*)     Urine Hgb LARGE (*)     Protein, UA 2+ (*)     Leukocyte Esterase, Urine LARGE (*)     All other components within  3.1 cm. Bladder: Unremarkable appearance of the bladder.  No significant post void residual.     Moderate right hydronephrosis.     FLUORO FOR SURGICAL PROCEDURES    Result Date: 5/10/2024  Radiology exam is complete. No Radiologist dictation. Please follow up with ordering provider.       RECENT VITALS:     Temp: 97.5 °F (36.4 °C),  Pulse: 70, Respirations: 16, BP: (!) 117/98, SpO2: 100 %    This patient is a 43 y.o. Female with flank pain, decreased nephrostomy output.  Patient had a hysterectomy, possible damage to ureter during this procedure.  Nephrostomy tube placed at the beginning of May.  Patient has had increasing pain and decreasing nephrostomy output for the past 36 hours.  Patient still produces urine.  No nausea, vomiting, fevers.  Tolerating adequate p.o. intake.  Patient's nephrostomy urine sample seems to be infected, urology is aware of this.  Patient is currently n.p.o., awaiting urology recommendations.  CT abdomen pelvis with out contrast is pending.    ED Course as of 06/08/24 1022   Sat Jun 08, 2024   0717 WBC, UA: 50  [MW]   0718 Bacteria, UA(!): MANY [MW]   0901 CT ABDOMEN PELVIS WO CONTRAST Additional Contrast? None  IMPRESSION:  1. Percutaneous nephrostomy tube in appropriate position on the right. No  appreciable distension of the pelvicaliceal system on the right.  2. 5.7 mm calcification lower pole right kidney.  3. Moderate dilatation of the right ureter without evidence of intraluminal  ureteral calculus, chronic which may be due to stricture or small ureteral  lesion.  4. Large colonic stool load.   [MW]   1001 Patient had nephrostomy tube replaced by interventional radiology, Duricef, Diflucan was placed by urology resident for treatment of the UTI.  At this time patient will be discharged with follow-up with urology clinic. [MW]      ED Course User Index  [MW] Jeffrey Mcpherson MD       OUTSTANDING TASKS / RECOMMENDATIONS:    CT A/P  Nephrostomy tube exchange   Reassess

## 2024-06-08 NOTE — ED PROVIDER NOTES
[facility]     Emergency Department     Faculty Attestation    I performed a history and physical examination of the patient and discussed management with the resident. I have reviewed and agree with the resident’s findings including all diagnostic interpretations, and treatment plans as written. Any areas of disagreement are noted on the chart. I was personally present for the key portions of any procedures. I have documented in the chart those procedures where I was not present during the key portions. I have reviewed the emergency nurses triage note. I agree with the chief complaint, past medical history, past surgical history, allergies, medications, social and family history as documented unless otherwise noted below. Documentation of the HPI, Physical Exam and Medical Decision Making performed by tim is based on my personal performance of the HPI, PE and MDM. For Physician Assistant/ Nurse Practitioner cases/documentation I have personally evaluated this patient and have completed at least one if not all key elements of the E/M (history, physical exam, and MDM). Additional findings are as noted.    Note Started: 4:38 AM EDT     44 yo F R flank pain, decrease out put R nephrostomy tube, no fever, no nausea, no injury  PE vss gcs 15 Sandrine RN escort for exam right nephrostomy tube site intact, mild drainage around site, no surrounding swelling, mild erythema surrounding site,     Plan: CT / contact urology    EKG Interpretation    Interpreted by me      CRITICAL CARE: There was a high probability of clinically significant/life threatening deterioration in this patient's condition which required my urgent intervention.  Total critical care time was 5 minutes.  This excludes any time for separately reportable procedures.       Felton Murray DO  06/08/24 0441       Felton Carrillo DO  06/08/24 0609       Felton Carrillo,

## 2024-06-08 NOTE — ED PROVIDER NOTES
[facility] ED  Emergency Department Encounter  Emergency Medicine Resident     Pt Name:Sylvia Weller  MRN: [id number]  Birthdate 1980  Date of evaluation: 6/8/24  PCP:  Sachi Martel PA  Note Started: 4:01 AM EDT      CHIEF COMPLAINT       Chief Complaint   Patient presents with    Flank Pain     nephrostomy tube possible infection, decreased urine output       HISTORY OF PRESENT ILLNESS  (Location/Symptom, Timing/Onset, Context/Setting, Quality, Duration, Modifying Factors, Severity.)      Sylvia Weller is a 43 y.o. female who presents with concerns for nephrostomy tube infection.  Reports severely decreased output over the last 24 hours.  Nephrostomy tube was originally placed at the beginning of May after having urinary injury during hysterectomy.  Patient reports later this week plans for definitive treatment with urology.  Reports some drainage around nephrostomy tube site reports normal oral intake.  Denies fever.  Denies any nausea, vomiting, abdominal pain, chest pain, lightheadedness, dizziness.    PAST MEDICAL / SURGICAL / SOCIAL / FAMILY HISTORY      has a past medical history of Anxiety, Arthritis, Depression, Dysmenorrhea, History of blood transfusion, History of bronchitis, Hydronephrosis, Migraine, Muscle spasm, Ovarian cyst, Personal history of other medical treatment, Under care of service provider, Under care of service provider, Under care of service provider, Under care of service provider, and Uterine fibroid.       has a past surgical history that includes Laparoscopic hysterectomy (Bilateral, 02/14/2024); Hysterectomy (N/A, 02/14/2024); IR GUIDED NEPHROSTOMY CATH PLACEMENT RIGHT (02/16/2024); Cystoscopy w/ ureteral stent placement (Right, 04/12/2024); Cystoscopy (Right, 04/12/2024); Cystoscopy (04/26/2024); Wound debridement (Right, 04/26/2024); Ureter surgery (Right, 04/26/2024); Cystoscopy w/ ureteral stent removal; Cystoscopy (N/A, 5/10/2024); and IR GUIDED  Known Problems Paternal Grandmother     No Known Problems Paternal Grandfather        Allergies:  Patient has no known allergies.    Home Medications:  Prior to Admission medications    Medication Sig Start Date End Date Taking? Authorizing Provider   ferrous sulfate (FE TABS 325) 325 (65 Fe) MG EC tablet Take 1 tablet by mouth 2 times daily (with meals) 5/15/24   Safia Davis APRN - NP   senna (SENOKOT) 8.6 MG tablet Take 2 tablets by mouth daily 5/16/24 6/15/24  Safia Davis APRN - NP   ketorolac (TORADOL) 10 MG tablet Take 1 tablet by mouth every 6 hours as needed for Pain 5/10/24 5/10/25  Trevor Hathaway MD   amphetamine-dextroamphetamine (ADDERALL) 15 MG tablet Take 1 tablet by mouth 2 times daily. 2/7/24   Heriberto Qiu MD   tamsulosin (FLOMAX) 0.4 MG capsule Take 1 capsule by mouth daily 4/12/24   Natividad Valencia MD   simethicone (MYLICON) 80 MG chewable tablet Take 1 tablet by mouth 4 times daily as needed for Flatulence 2/14/24   Jordyn Mg DO   ondansetron (ZOFRAN-ODT) 4 MG disintegrating tablet Take 1 tablet by mouth 3 times daily as needed for Nausea or Vomiting 2/14/24   Jordyn Mg DO   Magnesium 500 MG TABS Take 500 mg by mouth daily    Heriberto Qiu MD   turmeric 500 MG CAPS Take 1 capsule by mouth daily    Heriberto Qiu MD   b complex vitamins capsule Take 1 capsule by mouth daily    Heriberto Qiu MD   vitamin D-3 (CHOLECALCIFEROL) 125 MCG (5000 UT) TABS Take 1 tablet by mouth daily    Heriberto Qiu MD   Ginkgo 60 MG TABS Take 120 mg by mouth daily    Heriberto Qiu MD   Probiotic Product (PROBIOTIC BLEND PO) Take by mouth daily    Heriberto Qiu MD   COLLAGEN PO Take 2,500 mg by mouth daily    Heriberto Qiu MD   Cyanocobalamin (VITAMIN B 12 PO) Take 5,000 mcg by mouth daily    Heriberto Qiu MD   ASHWAGANDHA PO Take 1,300 mg by mouth    Heriberto Qiu MD   ALBUTEROL IN Inhale into the lungs as needed

## 2024-06-08 NOTE — CONSULTS
suicidal thoughts.  Endocrine: No heat or cold intolerances.  Allergic/Immunologic: No current seasonal allergies; no skin hives.      Physical Exam:      This a 43 y.o. female   Vitals:    06/08/24 0600   BP:    Pulse:    Resp:    Temp:    SpO2: 96%     Constitutional: Patient in no acute distress.  Neuro: alert and oriented to person place and time.   Head: Atraumatic and normocephalic.  Neck: Trachea midline.  Ext: 2+ radial pulses bilaterally.  Psych: Mood and affect normal.  Skin: No rashes or bruising present.  Lungs: Respiratory effort normal.  Cardiovascular:  Regular rhythm.  Abdomen: Soft, non-tender, non-distended. Right side has CVA tenderness on exam.  Bladder non-tender and not distended.  Lymphatics: no palpable lymphadenopathy  Pelvic exam: deferred.  Rectal exam not indicated.    Labs:  Recent Labs     06/08/24  0426   WBC 8.3   HGB 11.4*   HCT 36.9   MCV 90.4        Recent Labs     06/08/24  0426      K 3.7      CO2 24   BUN 13   CREATININE 0.8       Recent Labs     06/08/24  0437   COLORU [place]*   PHUR 6.5   WBCUA 50    RBCUA TOO NUMEROUS TO COUNT   BACTERIA MANY*   LEUKOCYTESUR LARGE*   UROBILINOGEN Normal   BILIRUBINUR NEGATIVE           -----------------------------------------------------------------  Imaging Results:  CT abdomen pelvis (6/8/2024):  1. Percutaneous nephrostomy tube in appropriate position on the right. No  appreciable distension of the pelvicaliceal system on the right.  2. 5.7 mm calcification lower pole right kidney.  3. Moderate dilatation of the right ureter without evidence of intraluminal  ureteral calculus, chronic which may be due to stricture or small ureteral  lesion.  4. Large colonic stool load.    Assessment and Plan   Impression:  42 yo female with   Right hydronephrosis secondary to right ureteral injury, s/p right nephrostomy tube placement    Plan:   Urology was able to flush fernandez catheter however could not withdraw  Consult IR for

## 2024-06-08 NOTE — PROGRESS NOTES
Patient to IR for right nephrostomy tube exchange.  Dr. Ferreira and KT/MC RTs at bedside.  Site prepped and draped, area numbed with lidocaine.  Right nephrostomy tube exchanged (see LDAs for tube documentation).  Tube sutured, stayfix drsg applied.  Patient tolerated well.

## 2024-06-08 NOTE — DISCHARGE INSTRUCTIONS
Seen in the emergency department for flank pain, problems with your nephrostomy tube.  While you are in the emergency department you are able to have your nephrostomy tube replaced by interventional radiology.  At this time we will give you a prescription for pain medication, Roxicodone you can take this up to 3 days as needed for symptom relief.  He also be started on Duricef for urinary tract infection.  Please follow-up with your urologist and 1 to 2 days of discharge.  Please follow-up with your primary care provider within 1 to 2 days of discharge.  Please return the emergency room with any new or worsening symptoms

## 2024-06-08 NOTE — ED PROVIDER NOTES
[facility]  FACULTY HANDOFF     7:21 AM EDT  Handoff taken on the following patient from prior Attending Physician:  Pt Name: Sylvia Weller  PCP:  Sachi Martel PA    Attestation  I was available and discussed any additional care issues that arose and coordinated the management plans with the resident(s) caring for the patient during my duty period. Any areas of disagreement with resident's documentation of care or procedures are noted on the chart. I was personally present for the key portions of any/all procedures during my duty period. I have documented in the chart those procedures where I was not present during the key portions.         CHIEF COMPLAINT       Chief Complaint   Patient presents with    Flank Pain     nephrostomy tube possible infection, decreased urine output         CURRENT MEDICATIONS     Previous Medications  Previous Medications    ALBUTEROL IN    Inhale into the lungs as needed    AMPHETAMINE-DEXTROAMPHETAMINE (ADDERALL) 15 MG TABLET    Take 1 tablet by mouth 2 times daily.    ASHWAGANDHA PO    Take 1,300 mg by mouth    B COMPLEX VITAMINS CAPSULE    Take 1 capsule by mouth daily    COLLAGEN PO    Take 2,500 mg by mouth daily    CYANOCOBALAMIN (VITAMIN B 12 PO)    Take 5,000 mcg by mouth daily    CYCLOBENZAPRINE (FLEXERIL) 10 MG TABLET    Take 1 tablet by mouth as needed for Muscle spasms    FERROUS SULFATE (FE TABS 325) 325 (65 FE) MG EC TABLET    Take 1 tablet by mouth 2 times daily (with meals)    FLUOXETINE (PROZAC) 20 MG CAPSULE    Take 1 capsule by mouth in the morning.    GINKGO 60 MG TABS    Take 120 mg by mouth daily    KETOROLAC (TORADOL) 10 MG TABLET    Take 1 tablet by mouth every 6 hours as needed for Pain    MAGNESIUM 500 MG TABS    Take 500 mg by mouth daily    ONDANSETRON (ZOFRAN-ODT) 4 MG DISINTEGRATING TABLET    Take 1 tablet by mouth 3 times daily as needed for Nausea or Vomiting    PROBIOTIC PRODUCT (PROBIOTIC BLEND PO)    Take by mouth daily    SENNA

## 2024-06-08 NOTE — ED NOTES
Pt to ED room 16 via triage with c/o possible nephrostomy tube infection, decreased output, and right sided flank pain. Patient states the pain is related to the nephrostomy tube. Patients symptoms began a few days ago but worsened yesterday. Patient denies any chest pain, shortness of breath, or fevers. Vitals obtained.    Patient A&Ox4, respirations even and unlabored, and speaking in complete sentences. Call light within reach, significant other at bedside.

## 2024-06-09 LAB
MICROORGANISM SPEC CULT: ABNORMAL
SERVICE CMNT-IMP: ABNORMAL
SPECIMEN DESCRIPTION: ABNORMAL

## 2024-06-10 PROBLEM — N39.0 UTI (URINARY TRACT INFECTION): Status: RESOLVED | Noted: 2024-05-11 | Resolved: 2024-06-10

## 2024-06-10 RX ORDER — DOCUSATE SODIUM 100 MG/1
100 CAPSULE, LIQUID FILLED ORAL 2 TIMES DAILY
Qty: 60 CAPSULE | Refills: 0 | Status: SHIPPED | OUTPATIENT
Start: 2024-06-10

## 2024-06-11 RX ORDER — ALBUTEROL SULFATE 90 UG/1
2 AEROSOL, METERED RESPIRATORY (INHALATION) EVERY 6 HOURS PRN
COMMUNITY

## 2024-06-13 ENCOUNTER — APPOINTMENT (OUTPATIENT)
Dept: GENERAL RADIOLOGY | Age: 44
End: 2024-06-13
Attending: UROLOGY
Payer: COMMERCIAL

## 2024-06-13 ENCOUNTER — ANESTHESIA EVENT (OUTPATIENT)
Dept: OPERATING ROOM | Age: 44
End: 2024-06-13
Payer: COMMERCIAL

## 2024-06-13 ENCOUNTER — HOSPITAL ENCOUNTER (OUTPATIENT)
Age: 44
Setting detail: OBSERVATION
Discharge: HOME | End: 2024-06-14
Attending: UROLOGY | Admitting: UROLOGY
Payer: COMMERCIAL

## 2024-06-13 ENCOUNTER — ANESTHESIA (OUTPATIENT)
Dept: OPERATING ROOM | Age: 44
End: 2024-06-13
Payer: COMMERCIAL

## 2024-06-13 DIAGNOSIS — G89.18 POSTOPERATIVE PAIN: Primary | ICD-10-CM

## 2024-06-13 PROBLEM — N13.5 URETERAL STRICTURE, RIGHT: Status: ACTIVE | Noted: 2024-06-13

## 2024-06-13 LAB
ANION GAP SERPL CALCULATED.3IONS-SCNC: 8 MMOL/L (ref 9–16)
BUN SERPL-MCNC: 10 MG/DL (ref 6–20)
CALCIUM SERPL-MCNC: 9 MG/DL (ref 8.6–10.4)
CHLORIDE SERPL-SCNC: 103 MMOL/L (ref 98–107)
CO2 SERPL-SCNC: 25 MMOL/L (ref 20–31)
CREAT SERPL-MCNC: 0.9 MG/DL (ref 0.5–0.9)
ERYTHROCYTE [DISTWIDTH] IN BLOOD BY AUTOMATED COUNT: 16.3 % (ref 11.8–14.4)
GFR, ESTIMATED: 85 ML/MIN/1.73M2
GLUCOSE SERPL-MCNC: 99 MG/DL (ref 74–99)
HCT VFR BLD AUTO: 33.6 % (ref 36.3–47.1)
HGB BLD-MCNC: 10.7 G/DL (ref 11.9–15.1)
MCH RBC QN AUTO: 28.3 PG (ref 25.2–33.5)
MCHC RBC AUTO-ENTMCNC: 31.8 G/DL (ref 28.4–34.8)
MCV RBC AUTO: 88.9 FL (ref 82.6–102.9)
NRBC BLD-RTO: 0 PER 100 WBC
PLATELET # BLD AUTO: 252 K/UL (ref 138–453)
PMV BLD AUTO: 10.4 FL (ref 8.1–13.5)
POTASSIUM SERPL-SCNC: 4 MMOL/L (ref 3.7–5.3)
RBC # BLD AUTO: 3.78 M/UL (ref 3.95–5.11)
SODIUM SERPL-SCNC: 136 MMOL/L (ref 136–145)
WBC OTHER # BLD: 13 K/UL (ref 3.5–11.3)

## 2024-06-13 PROCEDURE — 2500000003 HC RX 250 WO HCPCS

## 2024-06-13 PROCEDURE — 85027 COMPLETE CBC AUTOMATED: CPT

## 2024-06-13 PROCEDURE — C1758 CATHETER, URETERAL: HCPCS | Performed by: UROLOGY

## 2024-06-13 PROCEDURE — 7100000001 HC PACU RECOVERY - ADDTL 15 MIN: Performed by: UROLOGY

## 2024-06-13 PROCEDURE — G0378 HOSPITAL OBSERVATION PER HR: HCPCS

## 2024-06-13 PROCEDURE — 3600000019 HC SURGERY ROBOT ADDTL 15MIN: Performed by: UROLOGY

## 2024-06-13 PROCEDURE — 6370000000 HC RX 637 (ALT 250 FOR IP): Performed by: PHYSICIAN ASSISTANT

## 2024-06-13 PROCEDURE — C2617 STENT, NON-COR, TEM W/O DEL: HCPCS | Performed by: UROLOGY

## 2024-06-13 PROCEDURE — 74018 RADEX ABDOMEN 1 VIEW: CPT

## 2024-06-13 PROCEDURE — S2900 ROBOTIC SURGICAL SYSTEM: HCPCS | Performed by: UROLOGY

## 2024-06-13 PROCEDURE — 3600000009 HC SURGERY ROBOT BASE: Performed by: UROLOGY

## 2024-06-13 PROCEDURE — 80048 BASIC METABOLIC PNL TOTAL CA: CPT

## 2024-06-13 PROCEDURE — 2580000003 HC RX 258: Performed by: UROLOGY

## 2024-06-13 PROCEDURE — 6360000002 HC RX W HCPCS: Performed by: PHYSICIAN ASSISTANT

## 2024-06-13 PROCEDURE — 3700000001 HC ADD 15 MINUTES (ANESTHESIA): Performed by: UROLOGY

## 2024-06-13 PROCEDURE — 6360000002 HC RX W HCPCS: Performed by: ANESTHESIOLOGY

## 2024-06-13 PROCEDURE — 3700000000 HC ANESTHESIA ATTENDED CARE: Performed by: UROLOGY

## 2024-06-13 PROCEDURE — C2628 CATHETER, OCCLUSION: HCPCS | Performed by: UROLOGY

## 2024-06-13 PROCEDURE — 6360000002 HC RX W HCPCS

## 2024-06-13 PROCEDURE — 6360000002 HC RX W HCPCS: Performed by: STUDENT IN AN ORGANIZED HEALTH CARE EDUCATION/TRAINING PROGRAM

## 2024-06-13 PROCEDURE — C1747 HC ENDOSCOPE, SINGLE, URINARY TRACT: HCPCS | Performed by: UROLOGY

## 2024-06-13 PROCEDURE — 2580000003 HC RX 258: Performed by: PHYSICIAN ASSISTANT

## 2024-06-13 PROCEDURE — C1889 IMPLANT/INSERT DEVICE, NOC: HCPCS | Performed by: UROLOGY

## 2024-06-13 PROCEDURE — 7100000000 HC PACU RECOVERY - FIRST 15 MIN: Performed by: UROLOGY

## 2024-06-13 PROCEDURE — 2580000003 HC RX 258

## 2024-06-13 PROCEDURE — 96372 THER/PROPH/DIAG INJ SC/IM: CPT

## 2024-06-13 PROCEDURE — 2580000003 HC RX 258: Performed by: ANESTHESIOLOGY

## 2024-06-13 PROCEDURE — 6370000000 HC RX 637 (ALT 250 FOR IP)

## 2024-06-13 PROCEDURE — C1769 GUIDE WIRE: HCPCS | Performed by: UROLOGY

## 2024-06-13 PROCEDURE — 6360000004 HC RX CONTRAST MEDICATION: Performed by: UROLOGY

## 2024-06-13 PROCEDURE — 2709999900 HC NON-CHARGEABLE SUPPLY: Performed by: UROLOGY

## 2024-06-13 PROCEDURE — 6370000000 HC RX 637 (ALT 250 FOR IP): Performed by: UROLOGY

## 2024-06-13 DEVICE — CLIP INT L POLYMER LOK LIG HEM O LOK (6EA/PK): Type: IMPLANTABLE DEVICE | Status: FUNCTIONAL

## 2024-06-13 DEVICE — URETERAL STENT
Type: IMPLANTABLE DEVICE | Site: URETHRA | Status: FUNCTIONAL
Brand: POLARIS™ ULTRA

## 2024-06-13 RX ORDER — MIDAZOLAM HYDROCHLORIDE 2 MG/2ML
2 INJECTION, SOLUTION INTRAMUSCULAR; INTRAVENOUS
Status: DISCONTINUED | OUTPATIENT
Start: 2024-06-13 | End: 2024-06-13 | Stop reason: HOSPADM

## 2024-06-13 RX ORDER — PROPOFOL 10 MG/ML
INJECTION, EMULSION INTRAVENOUS PRN
Status: DISCONTINUED | OUTPATIENT
Start: 2024-06-13 | End: 2024-06-13 | Stop reason: SDUPTHER

## 2024-06-13 RX ORDER — OXYCODONE HYDROCHLORIDE 5 MG/1
5 TABLET ORAL EVERY 4 HOURS PRN
Status: DISCONTINUED | OUTPATIENT
Start: 2024-06-13 | End: 2024-06-14 | Stop reason: HOSPADM

## 2024-06-13 RX ORDER — ALBUTEROL SULFATE 90 UG/1
2 AEROSOL, METERED RESPIRATORY (INHALATION) EVERY 6 HOURS PRN
Status: DISCONTINUED | OUTPATIENT
Start: 2024-06-13 | End: 2024-06-14 | Stop reason: HOSPADM

## 2024-06-13 RX ORDER — ESMOLOL HYDROCHLORIDE 10 MG/ML
INJECTION INTRAVENOUS PRN
Status: DISCONTINUED | OUTPATIENT
Start: 2024-06-13 | End: 2024-06-13 | Stop reason: SDUPTHER

## 2024-06-13 RX ORDER — ONDANSETRON 2 MG/ML
4 INJECTION INTRAMUSCULAR; INTRAVENOUS
Status: DISCONTINUED | OUTPATIENT
Start: 2024-06-13 | End: 2024-06-13 | Stop reason: HOSPADM

## 2024-06-13 RX ORDER — LIDOCAINE HYDROCHLORIDE 10 MG/ML
INJECTION, SOLUTION EPIDURAL; INFILTRATION; INTRACAUDAL; PERINEURAL PRN
Status: DISCONTINUED | OUTPATIENT
Start: 2024-06-13 | End: 2024-06-13 | Stop reason: SDUPTHER

## 2024-06-13 RX ORDER — KETAMINE HCL IN NACL, ISO-OSM 100MG/10ML
SYRINGE (ML) INJECTION PRN
Status: DISCONTINUED | OUTPATIENT
Start: 2024-06-13 | End: 2024-06-13 | Stop reason: SDUPTHER

## 2024-06-13 RX ORDER — SODIUM CHLORIDE 0.9 % (FLUSH) 0.9 %
5-40 SYRINGE (ML) INJECTION PRN
Status: DISCONTINUED | OUTPATIENT
Start: 2024-06-13 | End: 2024-06-14 | Stop reason: HOSPADM

## 2024-06-13 RX ORDER — FENTANYL CITRATE 50 UG/ML
50 INJECTION, SOLUTION INTRAMUSCULAR; INTRAVENOUS EVERY 5 MIN PRN
Status: COMPLETED | OUTPATIENT
Start: 2024-06-13 | End: 2024-06-13

## 2024-06-13 RX ORDER — SODIUM CHLORIDE 0.9 % (FLUSH) 0.9 %
5-40 SYRINGE (ML) INJECTION EVERY 12 HOURS SCHEDULED
Status: DISCONTINUED | OUTPATIENT
Start: 2024-06-13 | End: 2024-06-13 | Stop reason: HOSPADM

## 2024-06-13 RX ORDER — FLUOXETINE HYDROCHLORIDE 20 MG/1
20 CAPSULE ORAL DAILY
Status: DISCONTINUED | OUTPATIENT
Start: 2024-06-13 | End: 2024-06-14 | Stop reason: HOSPADM

## 2024-06-13 RX ORDER — TAMSULOSIN HYDROCHLORIDE 0.4 MG/1
0.4 CAPSULE ORAL DAILY
Status: DISCONTINUED | OUTPATIENT
Start: 2024-06-13 | End: 2024-06-14 | Stop reason: HOSPADM

## 2024-06-13 RX ORDER — CYCLOBENZAPRINE HCL 10 MG
10 TABLET ORAL PRN
Status: DISCONTINUED | OUTPATIENT
Start: 2024-06-13 | End: 2024-06-14 | Stop reason: HOSPADM

## 2024-06-13 RX ORDER — OXYBUTYNIN CHLORIDE 5 MG/1
5 TABLET, EXTENDED RELEASE ORAL NIGHTLY
Status: DISCONTINUED | OUTPATIENT
Start: 2024-06-13 | End: 2024-06-14 | Stop reason: HOSPADM

## 2024-06-13 RX ORDER — ALBUTEROL SULFATE 90 UG/1
AEROSOL, METERED RESPIRATORY (INHALATION) PRN
Status: DISCONTINUED | OUTPATIENT
Start: 2024-06-13 | End: 2024-06-13 | Stop reason: SDUPTHER

## 2024-06-13 RX ORDER — NALOXONE HYDROCHLORIDE 0.4 MG/ML
INJECTION, SOLUTION INTRAMUSCULAR; INTRAVENOUS; SUBCUTANEOUS PRN
Status: DISCONTINUED | OUTPATIENT
Start: 2024-06-13 | End: 2024-06-13 | Stop reason: HOSPADM

## 2024-06-13 RX ORDER — MAGNESIUM SULFATE 1 G/100ML
INJECTION INTRAVENOUS PRN
Status: DISCONTINUED | OUTPATIENT
Start: 2024-06-13 | End: 2024-06-13 | Stop reason: SDUPTHER

## 2024-06-13 RX ORDER — GLYCOPYRROLATE 1 MG/5 ML
SYRINGE (ML) INTRAVENOUS PRN
Status: DISCONTINUED | OUTPATIENT
Start: 2024-06-13 | End: 2024-06-13 | Stop reason: SDUPTHER

## 2024-06-13 RX ORDER — SODIUM CHLORIDE 0.9 % (FLUSH) 0.9 %
5-40 SYRINGE (ML) INJECTION EVERY 12 HOURS SCHEDULED
Status: DISCONTINUED | OUTPATIENT
Start: 2024-06-13 | End: 2024-06-14 | Stop reason: HOSPADM

## 2024-06-13 RX ORDER — SODIUM CHLORIDE 0.9 % (FLUSH) 0.9 %
5-40 SYRINGE (ML) INJECTION PRN
Status: DISCONTINUED | OUTPATIENT
Start: 2024-06-13 | End: 2024-06-13 | Stop reason: HOSPADM

## 2024-06-13 RX ORDER — ONDANSETRON 2 MG/ML
4 INJECTION INTRAMUSCULAR; INTRAVENOUS EVERY 6 HOURS PRN
Status: DISCONTINUED | OUTPATIENT
Start: 2024-06-13 | End: 2024-06-14 | Stop reason: HOSPADM

## 2024-06-13 RX ORDER — ULTRASOUND COUPLING MEDIUM
GEL (GRAM) TOPICAL PRN
Status: DISCONTINUED | OUTPATIENT
Start: 2024-06-13 | End: 2024-06-13 | Stop reason: ALTCHOICE

## 2024-06-13 RX ORDER — FENTANYL CITRATE 50 UG/ML
INJECTION, SOLUTION INTRAMUSCULAR; INTRAVENOUS PRN
Status: DISCONTINUED | OUTPATIENT
Start: 2024-06-13 | End: 2024-06-13 | Stop reason: SDUPTHER

## 2024-06-13 RX ORDER — LANOLIN ALCOHOL/MO/W.PET/CERES
400 CREAM (GRAM) TOPICAL DAILY
Status: DISCONTINUED | OUTPATIENT
Start: 2024-06-13 | End: 2024-06-14 | Stop reason: HOSPADM

## 2024-06-13 RX ORDER — MORPHINE SULFATE 4 MG/ML
4 INJECTION, SOLUTION INTRAMUSCULAR; INTRAVENOUS
Status: DISCONTINUED | OUTPATIENT
Start: 2024-06-13 | End: 2024-06-13

## 2024-06-13 RX ORDER — ROCURONIUM BROMIDE 10 MG/ML
INJECTION, SOLUTION INTRAVENOUS PRN
Status: DISCONTINUED | OUTPATIENT
Start: 2024-06-13 | End: 2024-06-13 | Stop reason: SDUPTHER

## 2024-06-13 RX ORDER — FENTANYL CITRATE 50 UG/ML
25 INJECTION, SOLUTION INTRAMUSCULAR; INTRAVENOUS EVERY 5 MIN PRN
Status: DISCONTINUED | OUTPATIENT
Start: 2024-06-13 | End: 2024-06-13 | Stop reason: HOSPADM

## 2024-06-13 RX ORDER — POLYETHYLENE GLYCOL 3350 17 G/17G
17 POWDER, FOR SOLUTION ORAL DAILY PRN
Status: DISCONTINUED | OUTPATIENT
Start: 2024-06-13 | End: 2024-06-14 | Stop reason: HOSPADM

## 2024-06-13 RX ORDER — POTASSIUM CHLORIDE 7.45 MG/ML
10 INJECTION INTRAVENOUS PRN
Status: DISCONTINUED | OUTPATIENT
Start: 2024-06-13 | End: 2024-06-14 | Stop reason: HOSPADM

## 2024-06-13 RX ORDER — MAGNESIUM HYDROXIDE 1200 MG/15ML
LIQUID ORAL CONTINUOUS PRN
Status: COMPLETED | OUTPATIENT
Start: 2024-06-13 | End: 2024-06-13

## 2024-06-13 RX ORDER — SODIUM CHLORIDE, SODIUM LACTATE, POTASSIUM CHLORIDE, CALCIUM CHLORIDE 600; 310; 30; 20 MG/100ML; MG/100ML; MG/100ML; MG/100ML
INJECTION, SOLUTION INTRAVENOUS CONTINUOUS PRN
Status: DISCONTINUED | OUTPATIENT
Start: 2024-06-13 | End: 2024-06-13 | Stop reason: SDUPTHER

## 2024-06-13 RX ORDER — SIMETHICONE 80 MG
80 TABLET,CHEWABLE ORAL 4 TIMES DAILY PRN
Status: DISCONTINUED | OUTPATIENT
Start: 2024-06-13 | End: 2024-06-14 | Stop reason: HOSPADM

## 2024-06-13 RX ORDER — DEXTROAMPHETAMINE SACCHARATE, AMPHETAMINE ASPARTATE, DEXTROAMPHETAMINE SULFATE AND AMPHETAMINE SULFATE 3.75; 3.75; 3.75; 3.75 MG/1; MG/1; MG/1; MG/1
1 TABLET ORAL 2 TIMES DAILY
Status: DISCONTINUED | OUTPATIENT
Start: 2024-06-13 | End: 2024-06-14 | Stop reason: HOSPADM

## 2024-06-13 RX ORDER — DEXMEDETOMIDINE HYDROCHLORIDE 100 UG/ML
INJECTION, SOLUTION INTRAVENOUS PRN
Status: DISCONTINUED | OUTPATIENT
Start: 2024-06-13 | End: 2024-06-13 | Stop reason: SDUPTHER

## 2024-06-13 RX ORDER — MORPHINE SULFATE 2 MG/ML
2 INJECTION, SOLUTION INTRAMUSCULAR; INTRAVENOUS
Status: DISCONTINUED | OUTPATIENT
Start: 2024-06-13 | End: 2024-06-13

## 2024-06-13 RX ORDER — OXYCODONE HYDROCHLORIDE 5 MG/1
10 TABLET ORAL EVERY 4 HOURS PRN
Status: DISCONTINUED | OUTPATIENT
Start: 2024-06-13 | End: 2024-06-14 | Stop reason: HOSPADM

## 2024-06-13 RX ORDER — CALCIUM CHLORIDE 100 MG/ML
INJECTION INTRAVENOUS; INTRAVENTRICULAR PRN
Status: DISCONTINUED | OUTPATIENT
Start: 2024-06-13 | End: 2024-06-13 | Stop reason: SDUPTHER

## 2024-06-13 RX ORDER — FENTANYL CITRATE 50 UG/ML
100 INJECTION, SOLUTION INTRAMUSCULAR; INTRAVENOUS
Status: DISCONTINUED | OUTPATIENT
Start: 2024-06-13 | End: 2024-06-13 | Stop reason: HOSPADM

## 2024-06-13 RX ORDER — CARBOXYMETHYLCELLULOSE SODIUM 10 MG/ML
1 GEL OPHTHALMIC PRN
Status: DISCONTINUED | OUTPATIENT
Start: 2024-06-13 | End: 2024-06-14 | Stop reason: HOSPADM

## 2024-06-13 RX ORDER — ONDANSETRON 4 MG/1
4 TABLET, ORALLY DISINTEGRATING ORAL EVERY 8 HOURS PRN
Status: DISCONTINUED | OUTPATIENT
Start: 2024-06-13 | End: 2024-06-14 | Stop reason: HOSPADM

## 2024-06-13 RX ORDER — MINERAL OIL AND WHITE PETROLATUM 150; 830 MG/G; MG/G
OINTMENT OPHTHALMIC PRN
Status: DISCONTINUED | OUTPATIENT
Start: 2024-06-13 | End: 2024-06-13 | Stop reason: SDUPTHER

## 2024-06-13 RX ORDER — SODIUM CHLORIDE 9 MG/ML
INJECTION, SOLUTION INTRAVENOUS PRN
Status: DISCONTINUED | OUTPATIENT
Start: 2024-06-13 | End: 2024-06-14 | Stop reason: HOSPADM

## 2024-06-13 RX ORDER — SODIUM CHLORIDE 9 MG/ML
INJECTION, SOLUTION INTRAVENOUS PRN
Status: DISCONTINUED | OUTPATIENT
Start: 2024-06-13 | End: 2024-06-13 | Stop reason: HOSPADM

## 2024-06-13 RX ORDER — SODIUM CHLORIDE, SODIUM LACTATE, POTASSIUM CHLORIDE, CALCIUM CHLORIDE 600; 310; 30; 20 MG/100ML; MG/100ML; MG/100ML; MG/100ML
INJECTION, SOLUTION INTRAVENOUS CONTINUOUS
Status: DISCONTINUED | OUTPATIENT
Start: 2024-06-13 | End: 2024-06-13 | Stop reason: HOSPADM

## 2024-06-13 RX ORDER — DEXAMETHASONE SODIUM PHOSPHATE 10 MG/ML
INJECTION INTRAMUSCULAR; INTRAVENOUS PRN
Status: DISCONTINUED | OUTPATIENT
Start: 2024-06-13 | End: 2024-06-13 | Stop reason: SDUPTHER

## 2024-06-13 RX ORDER — MIDAZOLAM HYDROCHLORIDE 1 MG/ML
INJECTION INTRAMUSCULAR; INTRAVENOUS PRN
Status: DISCONTINUED | OUTPATIENT
Start: 2024-06-13 | End: 2024-06-13 | Stop reason: SDUPTHER

## 2024-06-13 RX ORDER — CEFAZOLIN SODIUM 1 G/3ML
INJECTION, POWDER, FOR SOLUTION INTRAMUSCULAR; INTRAVENOUS PRN
Status: DISCONTINUED | OUTPATIENT
Start: 2024-06-13 | End: 2024-06-13 | Stop reason: SDUPTHER

## 2024-06-13 RX ORDER — SENNOSIDES A AND B 8.6 MG/1
2 TABLET, FILM COATED ORAL DAILY
Status: DISCONTINUED | OUTPATIENT
Start: 2024-06-13 | End: 2024-06-14 | Stop reason: HOSPADM

## 2024-06-13 RX ORDER — POTASSIUM CHLORIDE 20 MEQ/1
40 TABLET, EXTENDED RELEASE ORAL PRN
Status: DISCONTINUED | OUTPATIENT
Start: 2024-06-13 | End: 2024-06-14 | Stop reason: HOSPADM

## 2024-06-13 RX ORDER — ONDANSETRON 2 MG/ML
INJECTION INTRAMUSCULAR; INTRAVENOUS PRN
Status: DISCONTINUED | OUTPATIENT
Start: 2024-06-13 | End: 2024-06-13 | Stop reason: SDUPTHER

## 2024-06-13 RX ORDER — HEPARIN SODIUM 5000 [USP'U]/ML
5000 INJECTION, SOLUTION INTRAVENOUS; SUBCUTANEOUS ONCE
Status: COMPLETED | OUTPATIENT
Start: 2024-06-13 | End: 2024-06-13

## 2024-06-13 RX ORDER — SODIUM CHLORIDE 9 MG/ML
INJECTION, SOLUTION INTRAVENOUS CONTINUOUS
Status: DISCONTINUED | OUTPATIENT
Start: 2024-06-13 | End: 2024-06-14 | Stop reason: HOSPADM

## 2024-06-13 RX ORDER — ALBUTEROL SULFATE 2.5 MG/3ML
2.5 SOLUTION RESPIRATORY (INHALATION)
Status: DISCONTINUED | OUTPATIENT
Start: 2024-06-13 | End: 2024-06-13 | Stop reason: HOSPADM

## 2024-06-13 RX ORDER — ACETAMINOPHEN 325 MG/1
650 TABLET ORAL EVERY 6 HOURS
Status: DISCONTINUED | OUTPATIENT
Start: 2024-06-13 | End: 2024-06-14 | Stop reason: HOSPADM

## 2024-06-13 RX ORDER — METHOCARBAMOL 750 MG/1
750 TABLET, FILM COATED ORAL 4 TIMES DAILY
Status: DISCONTINUED | OUTPATIENT
Start: 2024-06-13 | End: 2024-06-14 | Stop reason: HOSPADM

## 2024-06-13 RX ORDER — LANOLIN ALCOHOL/MO/W.PET/CERES
3 CREAM (GRAM) TOPICAL NIGHTLY PRN
Status: DISCONTINUED | OUTPATIENT
Start: 2024-06-13 | End: 2024-06-14 | Stop reason: HOSPADM

## 2024-06-13 RX ADMIN — DEXMEDETOMIDINE HYDROCHLORIDE 8 MCG: 100 INJECTION, SOLUTION INTRAVENOUS at 15:32

## 2024-06-13 RX ADMIN — FENTANYL CITRATE 50 MCG: 50 INJECTION, SOLUTION INTRAMUSCULAR; INTRAVENOUS at 13:20

## 2024-06-13 RX ADMIN — SENNOSIDES 17.2 MG: 8.6 TABLET, FILM COATED ORAL at 21:13

## 2024-06-13 RX ADMIN — SODIUM CHLORIDE, SODIUM LACTATE, POTASSIUM CHLORIDE, CALCIUM CHLORIDE: 600; 310; 30; 20 INJECTION, SOLUTION INTRAVENOUS at 13:16

## 2024-06-13 RX ADMIN — Medication 2000 MG: at 21:13

## 2024-06-13 RX ADMIN — SODIUM CHLORIDE, SODIUM LACTATE, POTASSIUM CHLORIDE, CALCIUM CHLORIDE: 600; 310; 30; 20 INJECTION, SOLUTION INTRAVENOUS at 13:26

## 2024-06-13 RX ADMIN — MAGNESIUM GLUCONATE 500 MG ORAL TABLET 400 MG: 500 TABLET ORAL at 21:13

## 2024-06-13 RX ADMIN — ONDANSETRON 4 MG: 2 INJECTION INTRAMUSCULAR; INTRAVENOUS at 16:43

## 2024-06-13 RX ADMIN — TAMSULOSIN HYDROCHLORIDE 0.4 MG: 0.4 CAPSULE ORAL at 21:14

## 2024-06-13 RX ADMIN — DEXAMETHASONE SODIUM PHOSPHATE 8 MG: 10 INJECTION INTRAMUSCULAR; INTRAVENOUS at 13:30

## 2024-06-13 RX ADMIN — CALCIUM CHLORIDE 0.5 G: 100 INJECTION INTRAVENOUS; INTRAVENTRICULAR at 13:49

## 2024-06-13 RX ADMIN — HYOSCYAMINE SULFATE 125 MCG: 0.12 TABLET SUBLINGUAL at 19:09

## 2024-06-13 RX ADMIN — ALBUTEROL SULFATE 8 PUFF: 90 AEROSOL, METERED RESPIRATORY (INHALATION) at 17:07

## 2024-06-13 RX ADMIN — FLUOXETINE HYDROCHLORIDE 20 MG: 20 CAPSULE ORAL at 21:12

## 2024-06-13 RX ADMIN — FENTANYL CITRATE 50 MCG: 50 INJECTION, SOLUTION INTRAMUSCULAR; INTRAVENOUS at 14:40

## 2024-06-13 RX ADMIN — ESMOLOL HYDROCHLORIDE 10 MG: 10 INJECTION INTRAVENOUS at 16:22

## 2024-06-13 RX ADMIN — METHOCARBAMOL 750 MG: 750 TABLET ORAL at 21:14

## 2024-06-13 RX ADMIN — ALBUTEROL SULFATE 8 PUFF: 90 AEROSOL, METERED RESPIRATORY (INHALATION) at 13:26

## 2024-06-13 RX ADMIN — FENTANYL CITRATE 50 MCG: 50 INJECTION, SOLUTION INTRAMUSCULAR; INTRAVENOUS at 13:26

## 2024-06-13 RX ADMIN — MORPHINE SULFATE 4 MG: 4 INJECTION INTRAVENOUS at 18:55

## 2024-06-13 RX ADMIN — OXYBUTYNIN CHLORIDE 5 MG: 5 TABLET, EXTENDED RELEASE ORAL at 21:12

## 2024-06-13 RX ADMIN — LIDOCAINE HYDROCHLORIDE 50 MG: 10 INJECTION, SOLUTION EPIDURAL; INFILTRATION; INTRACAUDAL; PERINEURAL at 13:20

## 2024-06-13 RX ADMIN — CALCIUM CHLORIDE 0.5 G: 100 INJECTION INTRAVENOUS; INTRAVENTRICULAR at 13:37

## 2024-06-13 RX ADMIN — ROCURONIUM BROMIDE 20 MG: 10 INJECTION, SOLUTION INTRAVENOUS at 16:07

## 2024-06-13 RX ADMIN — HEPARIN SODIUM 5000 UNITS: 5000 INJECTION INTRAVENOUS; SUBCUTANEOUS at 12:53

## 2024-06-13 RX ADMIN — FENTANYL CITRATE 50 MCG: 50 INJECTION, SOLUTION INTRAMUSCULAR; INTRAVENOUS at 14:29

## 2024-06-13 RX ADMIN — Medication 25 MG: at 13:20

## 2024-06-13 RX ADMIN — ROCURONIUM BROMIDE 20 MG: 10 INJECTION, SOLUTION INTRAVENOUS at 14:59

## 2024-06-13 RX ADMIN — Medication 0.1 MG: at 14:03

## 2024-06-13 RX ADMIN — SODIUM CHLORIDE: 9 INJECTION, SOLUTION INTRAVENOUS at 18:55

## 2024-06-13 RX ADMIN — MAGNESIUM SULFATE 1000 MG: 1 INJECTION INTRAVENOUS at 14:44

## 2024-06-13 RX ADMIN — FENTANYL CITRATE 50 MCG: 50 INJECTION, SOLUTION INTRAMUSCULAR; INTRAVENOUS at 15:46

## 2024-06-13 RX ADMIN — PROPOFOL 100 MG: 10 INJECTION, EMULSION INTRAVENOUS at 13:26

## 2024-06-13 RX ADMIN — FENTANYL CITRATE 50 MCG: 50 INJECTION, SOLUTION INTRAMUSCULAR; INTRAVENOUS at 14:22

## 2024-06-13 RX ADMIN — FENTANYL CITRATE 50 MCG: 50 INJECTION, SOLUTION INTRAMUSCULAR; INTRAVENOUS at 17:42

## 2024-06-13 RX ADMIN — PROPOFOL 200 MG: 10 INJECTION, EMULSION INTRAVENOUS at 13:20

## 2024-06-13 RX ADMIN — Medication 3 MG: at 21:13

## 2024-06-13 RX ADMIN — FENTANYL CITRATE 50 MCG: 50 INJECTION, SOLUTION INTRAMUSCULAR; INTRAVENOUS at 17:30

## 2024-06-13 RX ADMIN — Medication 25 MG: at 14:36

## 2024-06-13 RX ADMIN — ROCURONIUM BROMIDE 20 MG: 10 INJECTION, SOLUTION INTRAVENOUS at 14:22

## 2024-06-13 RX ADMIN — SODIUM CHLORIDE, SODIUM LACTATE, POTASSIUM CHLORIDE, CALCIUM CHLORIDE: 600; 310; 30; 20 INJECTION, SOLUTION INTRAVENOUS at 15:31

## 2024-06-13 RX ADMIN — DEXMEDETOMIDINE HYDROCHLORIDE 8 MCG: 100 INJECTION, SOLUTION INTRAVENOUS at 15:43

## 2024-06-13 RX ADMIN — ESMOLOL HYDROCHLORIDE 10 MG: 10 INJECTION INTRAVENOUS at 16:11

## 2024-06-13 RX ADMIN — ROCURONIUM BROMIDE 50 MG: 10 INJECTION, SOLUTION INTRAVENOUS at 13:20

## 2024-06-13 RX ADMIN — ROCURONIUM BROMIDE 20 MG: 10 INJECTION, SOLUTION INTRAVENOUS at 15:22

## 2024-06-13 RX ADMIN — ACETAMINOPHEN 650 MG: 325 TABLET ORAL at 18:55

## 2024-06-13 RX ADMIN — ESMOLOL HYDROCHLORIDE 10 MG: 10 INJECTION INTRAVENOUS at 16:39

## 2024-06-13 RX ADMIN — SODIUM CHLORIDE, SODIUM LACTATE, POTASSIUM CHLORIDE, CALCIUM CHLORIDE: 600; 310; 30; 20 INJECTION, SOLUTION INTRAVENOUS at 15:28

## 2024-06-13 RX ADMIN — SODIUM CHLORIDE, POTASSIUM CHLORIDE, SODIUM LACTATE AND CALCIUM CHLORIDE: 600; 310; 30; 20 INJECTION, SOLUTION INTRAVENOUS at 12:45

## 2024-06-13 RX ADMIN — OXYCODONE HYDROCHLORIDE 10 MG: 5 TABLET ORAL at 20:10

## 2024-06-13 RX ADMIN — CEFAZOLIN SODIUM 2 G: 1 INJECTION, POWDER, FOR SOLUTION INTRAMUSCULAR; INTRAVENOUS at 13:41

## 2024-06-13 RX ADMIN — MIDAZOLAM HYDROCHLORIDE 2 MG: 1 INJECTION INTRAMUSCULAR; INTRAVENOUS at 13:16

## 2024-06-13 RX ADMIN — HYDROMORPHONE HYDROCHLORIDE 0.25 MG: 1 INJECTION, SOLUTION INTRAMUSCULAR; INTRAVENOUS; SUBCUTANEOUS at 22:53

## 2024-06-13 RX ADMIN — ROCURONIUM BROMIDE 20 MG: 10 INJECTION, SOLUTION INTRAVENOUS at 14:10

## 2024-06-13 RX ADMIN — Medication 0.1 MG: at 13:51

## 2024-06-13 RX ADMIN — DEXMEDETOMIDINE HYDROCHLORIDE 4 MCG: 100 INJECTION, SOLUTION INTRAVENOUS at 15:53

## 2024-06-13 ASSESSMENT — PAIN DESCRIPTION - LOCATION
LOCATION: ABDOMEN

## 2024-06-13 ASSESSMENT — PAIN SCALES - GENERAL
PAINLEVEL_OUTOF10: 9
PAINLEVEL_OUTOF10: 10
PAINLEVEL_OUTOF10: 4
PAINLEVEL_OUTOF10: 8
PAINLEVEL_OUTOF10: 8
PAINLEVEL_OUTOF10: 10
PAINLEVEL_OUTOF10: 9
PAINLEVEL_OUTOF10: 7
PAINLEVEL_OUTOF10: 8

## 2024-06-13 ASSESSMENT — PAIN DESCRIPTION - DESCRIPTORS
DESCRIPTORS: CRAMPING;ACHING
DESCRIPTORS: ACHING;CRAMPING
DESCRIPTORS: ACHING;CRAMPING
DESCRIPTORS: CRAMPING;PRESSURE
DESCRIPTORS: DISCOMFORT
DESCRIPTORS: CRAMPING;ACHING

## 2024-06-13 ASSESSMENT — PAIN DESCRIPTION - ORIENTATION
ORIENTATION: LOWER

## 2024-06-13 ASSESSMENT — PAIN - FUNCTIONAL ASSESSMENT: PAIN_FUNCTIONAL_ASSESSMENT: NONE - DENIES PAIN

## 2024-06-13 ASSESSMENT — LIFESTYLE VARIABLES: SMOKING_STATUS: 1

## 2024-06-13 NOTE — ANESTHESIA PRE PROCEDURE
vitamins capsule Take 1 capsule by mouth daily    Heriberto Qiu MD   vitamin D-3 (CHOLECALCIFEROL) 125 MCG (5000 UT) TABS Take 1 tablet by mouth daily    Heriberto Qiu MD   Ginkgo 60 MG TABS Take 120 mg by mouth daily    Heriberto Qiu MD   Probiotic Product (PROBIOTIC BLEND PO) Take by mouth daily    Heriberto Qiu MD   COLLAGEN PO Take 2,500 mg by mouth daily    Heriberto Qiu MD   Cyanocobalamin (VITAMIN B 12 PO) Take 5,000 mcg by mouth daily    Heriberto Qiu MD   cyclobenzaprine (FLEXERIL) 10 MG tablet Take 1 tablet by mouth as needed for Muscle spasms 2/13/23   Caitlin Goddard DO   FLUoxetine (PROZAC) 20 MG capsule Take 1 capsule by mouth in the morning. 8/1/22   Caitlin Goddard DO       Current medications:    No current facility-administered medications for this encounter.       Allergies:  No Known Allergies    Problem List:    Patient Active Problem List   Diagnosis Code    Tobacco use Z72.0    Dysmenorrhea N94.6    Menorrhagia with regular cycle N92.0    Hydronephrosis N13.30    FREDDY (acute kidney injury) (HCC) N17.9    Flank pain R10.9    Hypokalemia E87.6       Past Medical History:        Diagnosis Date    Anxiety     Arthritis     Knee-left    COVID-19 2022    severe    COVID-19 vaccine administered     Depression     Dysmenorrhea     hysterectomy feb 2024    Flank pain     History of blood transfusion     feb 21, 2024    History of bronchitis     Hydronephrosis 02/16/2024    Migraine     Muscle spasm     Ovarian cyst     Personal history of other medical treatment     Patient wears oral braces    Under care of service provider     PCP: Sachi Martel PA-C, [place], last visit 8/2023    Under care of service provider     Podiatry: Dallas Mabry DPM, [place]essa, last visit 1/2024    Under care of service provider 04/24/2024    OBGYN: Ricardo Hoffman, last visit april 2024    Under care of service provider 04/24/2024    urology clinic-last visit april  COVID19 Not Detected 08/21/2022 12:46 AM           Anesthesia Evaluation  Patient summary reviewed   no history of anesthetic complications:   Airway: Mallampati: II  TM distance: >3 FB   Neck ROM: full  Mouth opening: > = 3 FB   Dental:          Pulmonary:normal exam    (+)           current smoker                           Cardiovascular:Negative CV ROS            Rhythm: regular  Rate: normal                    Neuro/Psych:   (+) headaches: migraine headaches, psychiatric history:depression/anxiety             GI/Hepatic/Renal: Neg GI/Hepatic/Renal ROS            Endo/Other:    (+) blood dyscrasia: anemia:..                 Abdominal:   (+) obese          Vascular: negative vascular ROS.         Other Findings:           Anesthesia Plan      general     ASA 2       Induction: intravenous.    MIPS: Postoperative opioids intended and Prophylactic antiemetics administered.  Anesthetic plan and risks discussed with patient.    Use of blood products discussed with patient whom.    Plan discussed with CRNA.                  Kirk Eason MD   6/13/2024

## 2024-06-13 NOTE — H&P
History and Physical    Patient:  Sylvia Weller  MRN: [id number]  YOB: 1980    CHIEF COMPLAINT:  Right ureteral injury, right hydronephrosis    HISTORY OF PRESENT ILLNESS:   The patient is a 43 y.o. female who presents with right ureteral injury after abdominal hysterectomy and since then has had right ureteral stricture. On prior retrograde pyelogram from 5/10/2024 patient had area of stricture around two-thirds of the way down the ureter to the iliacs, proximal to the hiatus of the bladder.     Past Medical History:    Past Medical History:   Diagnosis Date    Anxiety     Arthritis     Knee-left    COVID-19 2022    severe    COVID-19 vaccine administered     Depression     Dysmenorrhea     hysterectomy feb 2024    Flank pain     History of blood transfusion     feb 21, 2024    History of bronchitis     Hydronephrosis 02/16/2024    Migraine     Muscle spasm     Ovarian cyst     Personal history of other medical treatment     Patient wears oral braces    Under care of service provider     PCP: Beverley Lala PA-C, last visit 8/2023    Under care of service provider     Podiatry: Dallas Mabry DPM, Tessa, last visit 1/2024    Under care of service provider 04/24/2024    OBGYN: -Ricardo, last visit april 2024    Under care of service provider 04/24/2024    urology clinic-last visit april 2024    Uterine fibroid     hysterectomy feb 2024       Past Surgical History:    Past Surgical History:   Procedure Laterality Date    CYSTOSCOPY Right 04/12/2024    CYSTOSCOPY, RETROGRADE PYELOGRAM, STENT EXCHANGE, BALLOON DILATION,URETEROSCOPY performed by Urban Cote Jr., MD at [facility] OR    CYSTOSCOPY  04/26/2024    CYSTOSCOPY N/A 5/10/2024    CYSTOSCOPY, RIGHT STENT REMOVAL performed by Urban Cote Jr., MD at [facility] OR    CYSTOSCOPY W/ URETERAL STENT PLACEMENT Right 04/12/2024    CYSTOSCOPY, RETROGRADE PYELOGRAM, STENT EXCHANGE, BALLOON DILATION,URETEROSCOPY (Right)    CYSTOSCOPY

## 2024-06-13 NOTE — H&P
Pre-op History and Physical      Patient:  Sylvia Weller  MRN: [id number]  YOB: 1980    HISTORY OF PRESENT ILLNESS:     The patient is a 43 y.o. female who presents with right ureteral injury after abdominal hysterectomy Feb 2024 and since has ureteral strictures. Patient previously had percutaneous nephrostomy tubes and stent placed. Patient then subsequently had endoscopic dilations and stent placements. Patient underwent trial of stent removal however afterwards developed right flank pain, then underwent nephrostomy tube placement. Patient is scheduled for robotic right ureteral reimplantation with Dr. Kera Jr. Today.    Patient's old records, notes and chart reviewed and summarized above.     I independently reviewed the images and verified the radiology reports from:    CT ABDOMEN PELVIS WO CONTRAST Additional Contrast? None    Result Date: 6/8/2024  EXAMINATION: CT OF THE ABDOMEN AND PELVIS WITHOUT CONTRAST 6/8/2024 6:59 am TECHNIQUE: CT of the abdomen and pelvis was performed without the administration of intravenous contrast. Multiplanar reformatted images are provided for review. Automated exposure control, iterative reconstruction, and/or weight based adjustment of the mA/kV was utilized to reduce the radiation dose to as low as reasonably achievable. COMPARISON: CT abdomen and pelvis of 16 February 2024. HISTORY: ORDERING SYSTEM PROVIDED HISTORY: Nephrostomy tube, decreased output, increasing pain TECHNOLOGIST PROVIDED HISTORY: Nephrostomy tube, decreased output, increasing pain Decision Support Exception - unselect if not a suspected or confirmed emergency medical condition->Emergency Medical Condition (MA) Is the patient pregnant?->No FINDINGS: Lower Chest: No acute abnormality in the lung bases.  No extrapleural air or effusions. Organs: Liver, spleen, gallbladder, adrenal and pancreas are unremarkable. Left kidney is within normal limits.  A percutaneous nephrostomy tube is present  negative  Genitourinary: no acute issues  Musculoskeletal: negative  Skin: negative   Neurological: negative  Hematological/Lymphatic: negative  Psychological: negative    PHYSICAL EXAM:    No data found.  Constitutional: Patient in NAD  Neuro: Alert and oriented to person, place, and time  Psych: Mood and affect normal  Skin: Clean, dry, intact   Lungs: Respiratory effort normal, CTA  Cardiovascular:  Normal peripheral pulses; no murmur. Normal rhythm  Abdomen: Soft, non-tender, non-distended, no hepatosplenomegaly or hernia, CVA tenderness mild right  Bladder: Non-tender and non-disdended   : Non-tender, skin intact, no lesions       LABS:   No results for input(s): \"WBC\", \"HGB\", \"HCT\", \"MCV\", \"PLT\" in the last 72 hours.  No results for input(s): \"NA\", \"K\", \"CL\", \"CO2\", \"PHOS\", \"BUN\", \"CREATININE\" in the last 72 hours.    Invalid input(s): \"CA\"  No results found for: \"PSA\"      Urinalysis: No results for input(s): \"COLORU\", \"PHUR\", \"LABCAST\", \"WBCUA\", \"RBCUA\", \"MUCUS\", \"TRICHOMONAS\", \"YEAST\", \"BACTERIA\", \"CLARITYU\", \"SPECGRAV\", \"LEUKOCYTESUR\", \"UROBILINOGEN\", \"BILIRUBINUR\", \"BLOODU\" in the last 72 hours.    Invalid input(s): \"NITRATE\", \"GLUCOSEUKETONESUAMORPHOUS\"     -----------------------------------------------------------------    ASSESSMENT AND PLAN:    Impression:    Right ureteral injury  Recurrent right ureteral strictures  Right hydronephrosis  Patient Active Problem List   Diagnosis    Tobacco use    Dysmenorrhea    Menorrhagia with regular cycle    Hydronephrosis    FREDDY (acute kidney injury) (HCC)    Flank pain    Hypokalemia       Plan: Robotic laparoscopic ureteral reimplantation, possible psoas hitch and/or boari flap RIGHT in OR today.    Consent obtained      Hue Sarmiento PA-C  10:03 AM 6/13/2024

## 2024-06-13 NOTE — OP NOTE
Operative Note      Patient: Sylvia Weller  YOB: 1980  MRN: [id number]    Date of Procedure: 6/13/2024    Pre-Op Diagnosis Codes:     * Ureteral obstruction, right [N13.5]     * Injury of right ureter, initial encounter [S37.10XA]    Post-Op Diagnosis: Same       Procedure:  Cystoscopy, right retrograde pyelogram, cystogram, right ureteroscopy, right ureteral stent placement, robotic assisted laparoscopic right ureteral reimplantation    Surgeon(s):  Urban Cote Jr., MD    Assistant:   First Assistant: Radha Hollins RN  Resident: Mikhail Rios MD; Joao Doty MD    Anesthesia: General    Estimated Blood Loss (mL): Minimal    Complications: None    Specimens:   * No specimens in log *    Implants:  Implant Name Type Inv. Item Serial No.  Lot No. LRB No. Used Action   CLIP INT L POLYMER DEJAN LIG HEM O DEJAN (6EA/PK) - [id number]  CLIP INT L POLYMER DEJAN LIG HEM O DEJAN (6EA/PK)  [employer] MEDICAL-  Right 2 Implanted   STENT URET 6FR L26CM PERCFLX HYDR+ DBL PGTL THRD 2 - [id number]  STENT URET 6FR L26CM PERCFLX HYDR+ DBL PGTL THRD 2  [employer] [facility]OLOG[facility]- [id number] Right 1 Implanted         Drains:   Closed/Suction Drain Left;Lateral LLQ Bulb (Active)       Nephrostomy Tube Right Flank 8 fr (Active)   Dressing Status New dressing applied;Clean, dry & intact 06/08/24 0930   Dressing Type Other (Comment) 06/08/24 0930   Collection Container Leg bag 06/08/24 0930   Securement Method Securing device (Describe) 06/08/24 0930   Status Patent 06/08/24 0930       Urinary Catheter 06/13/24 3 Way (Active)       [REMOVED] Nephrostomy Tube 1 Right Flank 8 fr (Removed)   Dressing Status Clean, dry & intact 05/15/24 1500   Dressing Type Special type 05/15/24 1500   Collection Container Leg bag 05/15/24 1500   Securement Method Leg strap 05/15/24 1500   Status Draining 05/15/24 1500   Urine Color Other (Comment) 05/15/24 1500   Urine Appearance Other (Comment) 05/15/24 1500   Output (mL) 600  4

## 2024-06-14 ENCOUNTER — APPOINTMENT (OUTPATIENT)
Dept: INTERVENTIONAL RADIOLOGY/VASCULAR | Age: 44
End: 2024-06-14
Attending: UROLOGY
Payer: COMMERCIAL

## 2024-06-14 VITALS
BODY MASS INDEX: 32.02 KG/M2 | HEIGHT: 62 IN | HEART RATE: 70 BPM | OXYGEN SATURATION: 100 % | DIASTOLIC BLOOD PRESSURE: 80 MMHG | RESPIRATION RATE: 16 BRPM | SYSTOLIC BLOOD PRESSURE: 122 MMHG | TEMPERATURE: 97.7 F | WEIGHT: 174 LBS

## 2024-06-14 DIAGNOSIS — Z98.890 S/P URETERAL REIMPLANTATION: ICD-10-CM

## 2024-06-14 DIAGNOSIS — N13.5 OBSTRUCTION OF RIGHT URETER: Primary | ICD-10-CM

## 2024-06-14 LAB
ANION GAP SERPL CALCULATED.3IONS-SCNC: 11 MMOL/L (ref 9–16)
BUN SERPL-MCNC: 9 MG/DL (ref 6–20)
CALCIUM SERPL-MCNC: 9 MG/DL (ref 8.6–10.4)
CHLORIDE SERPL-SCNC: 105 MMOL/L (ref 98–107)
CO2 SERPL-SCNC: 24 MMOL/L (ref 20–31)
CREAT SERPL-MCNC: 0.9 MG/DL (ref 0.5–0.9)
CREATININE FLUID: 0.8 MG/DL
ERYTHROCYTE [DISTWIDTH] IN BLOOD BY AUTOMATED COUNT: 16.4 % (ref 11.8–14.4)
GFR, ESTIMATED: 83 ML/MIN/1.73M2
GLUCOSE SERPL-MCNC: 95 MG/DL (ref 74–99)
HCT VFR BLD AUTO: 32.7 % (ref 36.3–47.1)
HGB BLD-MCNC: 10.3 G/DL (ref 11.9–15.1)
MCH RBC QN AUTO: 28.1 PG (ref 25.2–33.5)
MCHC RBC AUTO-ENTMCNC: 31.5 G/DL (ref 28.4–34.8)
MCV RBC AUTO: 89.3 FL (ref 82.6–102.9)
NRBC BLD-RTO: 0 PER 100 WBC
PLATELET # BLD AUTO: 281 K/UL (ref 138–453)
PMV BLD AUTO: 10.8 FL (ref 8.1–13.5)
POTASSIUM SERPL-SCNC: 3.7 MMOL/L (ref 3.7–5.3)
RBC # BLD AUTO: 3.66 M/UL (ref 3.95–5.11)
SODIUM SERPL-SCNC: 140 MMOL/L (ref 136–145)
SPECIMEN TYPE: NORMAL
WBC OTHER # BLD: 18 K/UL (ref 3.5–11.3)

## 2024-06-14 PROCEDURE — 50389 REMOVE RENAL TUBE W/FLUORO: CPT

## 2024-06-14 PROCEDURE — 6370000000 HC RX 637 (ALT 250 FOR IP): Performed by: PHYSICIAN ASSISTANT

## 2024-06-14 PROCEDURE — 36415 COLL VENOUS BLD VENIPUNCTURE: CPT

## 2024-06-14 PROCEDURE — G0378 HOSPITAL OBSERVATION PER HR: HCPCS

## 2024-06-14 PROCEDURE — 6360000002 HC RX W HCPCS: Performed by: PHYSICIAN ASSISTANT

## 2024-06-14 PROCEDURE — C1769 GUIDE WIRE: HCPCS

## 2024-06-14 PROCEDURE — 85027 COMPLETE CBC AUTOMATED: CPT

## 2024-06-14 PROCEDURE — 6360000002 HC RX W HCPCS: Performed by: STUDENT IN AN ORGANIZED HEALTH CARE EDUCATION/TRAINING PROGRAM

## 2024-06-14 PROCEDURE — 6360000002 HC RX W HCPCS

## 2024-06-14 PROCEDURE — 82570 ASSAY OF URINE CREATININE: CPT

## 2024-06-14 PROCEDURE — 96374 THER/PROPH/DIAG INJ IV PUSH: CPT

## 2024-06-14 PROCEDURE — 6360000004 HC RX CONTRAST MEDICATION: Performed by: UROLOGY

## 2024-06-14 PROCEDURE — 80048 BASIC METABOLIC PNL TOTAL CA: CPT

## 2024-06-14 RX ORDER — OXYBUTYNIN CHLORIDE 5 MG/1
5 TABLET, EXTENDED RELEASE ORAL NIGHTLY
Qty: 10 TABLET | Refills: 0 | Status: SHIPPED | OUTPATIENT
Start: 2024-06-14 | End: 2024-06-14

## 2024-06-14 RX ORDER — KETOROLAC TROMETHAMINE 10 MG/1
10 TABLET, FILM COATED ORAL EVERY 6 HOURS PRN
Qty: 20 TABLET | Refills: 0 | Status: SHIPPED | OUTPATIENT
Start: 2024-06-14 | End: 2024-06-14

## 2024-06-14 RX ORDER — METHOCARBAMOL 750 MG/1
750 TABLET, FILM COATED ORAL 3 TIMES DAILY
Qty: 21 TABLET | Refills: 0 | Status: SHIPPED | OUTPATIENT
Start: 2024-06-14 | End: 2024-06-14

## 2024-06-14 RX ORDER — OXYCODONE HYDROCHLORIDE AND ACETAMINOPHEN 5; 325 MG/1; MG/1
1 TABLET ORAL EVERY 6 HOURS PRN
Qty: 18 TABLET | Refills: 0 | Status: SHIPPED | OUTPATIENT
Start: 2024-06-14 | End: 2024-06-14

## 2024-06-14 RX ORDER — POLYETHYLENE GLYCOL 3350 17 G/17G
17 POWDER, FOR SOLUTION ORAL DAILY PRN
Qty: 170 G | Refills: 0 | Status: SHIPPED | OUTPATIENT
Start: 2024-06-14 | End: 2024-06-24

## 2024-06-14 RX ORDER — CEFADROXIL 500 MG/1
500 CAPSULE ORAL 2 TIMES DAILY
Qty: 6 CAPSULE | Refills: 0 | Status: SHIPPED | OUTPATIENT
Start: 2024-06-19 | End: 2024-06-22

## 2024-06-14 RX ORDER — METHOCARBAMOL 750 MG/1
750 TABLET, FILM COATED ORAL 3 TIMES DAILY
Qty: 21 TABLET | Refills: 0 | Status: SHIPPED | OUTPATIENT
Start: 2024-06-14 | End: 2024-06-24

## 2024-06-14 RX ORDER — OXYCODONE HYDROCHLORIDE AND ACETAMINOPHEN 5; 325 MG/1; MG/1
1 TABLET ORAL EVERY 6 HOURS PRN
Qty: 18 TABLET | Refills: 0 | Status: SHIPPED | OUTPATIENT
Start: 2024-06-14 | End: 2024-06-19

## 2024-06-14 RX ORDER — OXYBUTYNIN CHLORIDE 5 MG/1
5 TABLET, EXTENDED RELEASE ORAL NIGHTLY
Qty: 10 TABLET | Refills: 0 | Status: SHIPPED | OUTPATIENT
Start: 2024-06-14

## 2024-06-14 RX ORDER — KETOROLAC TROMETHAMINE 30 MG/ML
30 INJECTION, SOLUTION INTRAMUSCULAR; INTRAVENOUS EVERY 6 HOURS PRN
Status: DISCONTINUED | OUTPATIENT
Start: 2024-06-14 | End: 2024-06-14 | Stop reason: HOSPADM

## 2024-06-14 RX ORDER — ONDANSETRON 4 MG/1
4 TABLET, ORALLY DISINTEGRATING ORAL EVERY 8 HOURS PRN
Qty: 30 TABLET | Refills: 0 | Status: SHIPPED | OUTPATIENT
Start: 2024-06-14

## 2024-06-14 RX ORDER — POLYETHYLENE GLYCOL 3350 17 G/17G
17 POWDER, FOR SOLUTION ORAL DAILY PRN
Qty: 170 G | Refills: 0 | Status: SHIPPED | OUTPATIENT
Start: 2024-06-14 | End: 2024-06-14

## 2024-06-14 RX ORDER — KETOROLAC TROMETHAMINE 10 MG/1
10 TABLET, FILM COATED ORAL EVERY 6 HOURS PRN
Qty: 20 TABLET | Refills: 0 | Status: SHIPPED | OUTPATIENT
Start: 2024-06-14 | End: 2025-06-14

## 2024-06-14 RX ORDER — ONDANSETRON 4 MG/1
4 TABLET, ORALLY DISINTEGRATING ORAL EVERY 8 HOURS PRN
Qty: 30 TABLET | Refills: 0 | Status: SHIPPED | OUTPATIENT
Start: 2024-06-14 | End: 2024-06-14

## 2024-06-14 RX ORDER — CEFADROXIL 500 MG/1
500 CAPSULE ORAL 2 TIMES DAILY
Qty: 6 CAPSULE | Refills: 0 | Status: SHIPPED | OUTPATIENT
Start: 2024-06-19 | End: 2024-06-14

## 2024-06-14 RX ORDER — DOCUSATE SODIUM 100 MG/1
100 CAPSULE, LIQUID FILLED ORAL 2 TIMES DAILY
Qty: 20 CAPSULE | Refills: 0 | Status: SHIPPED | OUTPATIENT
Start: 2024-06-14 | End: 2024-06-24

## 2024-06-14 RX ADMIN — HYOSCYAMINE SULFATE 125 MCG: 0.12 TABLET SUBLINGUAL at 01:01

## 2024-06-14 RX ADMIN — HYOSCYAMINE SULFATE 125 MCG: 0.12 TABLET SUBLINGUAL at 05:17

## 2024-06-14 RX ADMIN — Medication 2000 MG: at 05:20

## 2024-06-14 RX ADMIN — TAMSULOSIN HYDROCHLORIDE 0.4 MG: 0.4 CAPSULE ORAL at 13:45

## 2024-06-14 RX ADMIN — SIMETHICONE 80 MG: 80 TABLET, CHEWABLE ORAL at 03:08

## 2024-06-14 RX ADMIN — OXYCODONE HYDROCHLORIDE 10 MG: 5 TABLET ORAL at 09:26

## 2024-06-14 RX ADMIN — OXYCODONE HYDROCHLORIDE 10 MG: 5 TABLET ORAL at 05:17

## 2024-06-14 RX ADMIN — METHOCARBAMOL 750 MG: 750 TABLET ORAL at 09:26

## 2024-06-14 RX ADMIN — ACETAMINOPHEN 650 MG: 325 TABLET ORAL at 12:44

## 2024-06-14 RX ADMIN — OXYCODONE HYDROCHLORIDE 10 MG: 5 TABLET ORAL at 01:01

## 2024-06-14 RX ADMIN — KETOROLAC TROMETHAMINE 30 MG: 30 INJECTION, SOLUTION INTRAMUSCULAR; INTRAVENOUS at 12:44

## 2024-06-14 RX ADMIN — ACETAMINOPHEN 650 MG: 325 TABLET ORAL at 07:10

## 2024-06-14 RX ADMIN — IOHEXOL 20 ML: 240 INJECTION, SOLUTION INTRATHECAL; INTRAVASCULAR; INTRAVENOUS; ORAL at 11:29

## 2024-06-14 RX ADMIN — FLUOXETINE HYDROCHLORIDE 20 MG: 20 CAPSULE ORAL at 13:44

## 2024-06-14 RX ADMIN — METHOCARBAMOL 750 MG: 750 TABLET ORAL at 12:44

## 2024-06-14 RX ADMIN — SENNOSIDES 17.2 MG: 8.6 TABLET, FILM COATED ORAL at 13:45

## 2024-06-14 RX ADMIN — MAGNESIUM GLUCONATE 500 MG ORAL TABLET 400 MG: 500 TABLET ORAL at 13:45

## 2024-06-14 RX ADMIN — ACETAMINOPHEN 650 MG: 325 TABLET ORAL at 01:01

## 2024-06-14 RX ADMIN — HYDROMORPHONE HYDROCHLORIDE 0.25 MG: 1 INJECTION, SOLUTION INTRAMUSCULAR; INTRAVENOUS; SUBCUTANEOUS at 07:11

## 2024-06-14 ASSESSMENT — PAIN SCALES - GENERAL
PAINLEVEL_OUTOF10: 9
PAINLEVEL_OUTOF10: 10
PAINLEVEL_OUTOF10: 6
PAINLEVEL_OUTOF10: 10
PAINLEVEL_OUTOF10: 9

## 2024-06-14 ASSESSMENT — PAIN DESCRIPTION - DESCRIPTORS: DESCRIPTORS: SQUEEZING;PRESSURE;CRAMPING

## 2024-06-14 NOTE — PROGRESS NOTES
Urology Progress Note      Subjective: Sylvia Weller is a 43 y.o. female.  His/Her current Diet is: Diet NPO.    Since the previous note, the patient reports the following:  No fevers, chills, nausea, vomiting  Has not ambulated, just stood up  Complaining of right abdominal soreness      AM labs pending    Irwin UOP 2600 mL/12h    Vitals and Labs:  Vitals:    06/14/24 0101 06/14/24 0131 06/14/24 0400 06/14/24 0517   BP:   104/66    Pulse:   77    Resp: 16 16 16 16   Temp:   97.7 °F (36.5 °C)    TempSrc:   Oral    SpO2:   100%    Weight:       Height:         I/O last 3 completed shifts:  In: 2070 [P.O.:50; I.V.:2020]  Out: 430 [Urine:400; Blood:30]    Recent Labs     06/13/24  1836   WBC 13.0*   HGB 10.7*   HCT 33.6*   MCV 88.9        Recent Labs     06/13/24  1836      K 4.0      CO2 25   BUN 10   CREATININE 0.9       No results for input(s): \"COLORU\", \"PHUR\", \"LABCAST\", \"WBCUA\", \"RBCUA\", \"MUCUS\", \"TRICHOMONAS\", \"YEAST\", \"BACTERIA\", \"CLARITYU\", \"SPECGRAV\", \"LEUKOCYTESUR\", \"UROBILINOGEN\", \"BILIRUBINUR\", \"BLOODU\" in the last 72 hours.    Invalid input(s): \"NITRATE\", \"GLUCOSEUKETONESUAMORPHOUS\"    Physical Exam:  NAD  A/O x 3  RRR  No accessory muscles of inspiration  Abdomen soft, + right abdominal tenderness, incisions C/D/I, non-distended. + right CVA tenderness, + left abdominal MELVIN drain with serosanguinous output  Irwin in place. Clear yellow UOP.  No calf pain.  EPCs on. Machine turned on.    Impression:  42 yo female with  Right ureteral stricture s/p right ureteral reimplant 6/13/2024    Plan:  Follow AM labs  NPO  Encourage ambulation  Incentive spirometry  Consult IR for right nephrostomy tube removal under fluoroscopy today  Likely discharge later today      Mikhail Rios MD  6:49 AM 6/14/2024

## 2024-06-14 NOTE — CARE COORDINATION
06/14/24 1048   Readmission Assessment   Number of Days since last admission? 8-30 days   Previous Disposition Home with Family   Who is being Interviewed Patient   What was the patient's/caregiver's perception as to why they think they needed to return back to the hospital? Other (Comment)  (readmission is obs)   Did you visit your Primary Care Physician after you left the hospital, before you returned this time? No   Why weren't you able to visit your PCP? Did not have an appointment   Did you see a specialist, such as Cardiac, Pulmonary, Orthopedic Physician, etc. after you left the hospital? Yes   Who advised the patient to return to the hospital? Physician   Does the patient report anything that got in the way of taking their medications? No   In our efforts to provide the best possible care to you and others like you, can you think of anything that we could have done to help you after you left the hospital the first time, so that you might not have needed to return so soon? Other (Comment)  (planned procedure)

## 2024-06-14 NOTE — BRIEF OP NOTE
Brief Postoperative Note    Sylvia Weller  YOB: 1980  [id number]    Pre-operative Diagnosis:     Post-operative Diagnosis: Same    Procedure: Right hydronephrosis/ureteral stricture s/p internal stent placement    Anesthesia: None    Surgeons/Assistants: Chano Canales MD     Estimated Blood Loss: minimal    Complications: none immediate    Specimens: were not obtained    Findings: Antegrade nephrostogram showed contrast extend to bladder.  Right PCN removed over a wire uneventfully.      Electronically signed by Chano Canales MD on 6/14/2024 at 11:56 AM

## 2024-06-14 NOTE — CARE COORDINATION
DISCHARGE PLANNING EVALUATION: OP/OBSERVATION        6/14/24, 10:43 AM EDT    Sylvia Weller         Location: 0316/0316-01   Reason for hospitalization: Ureteral obstruction, right [N13.5]  Injury of right ureter, initial encounter [S37.10XA]  Ureteral stricture, right [N13.5]     CM Services requested for transitional needs.     PCP: Sachi Martel PA    Transportation/Food Security/Housekeeping Addressed:  No issues identified.     Equipment needs: none    Case Management Services Information Letter Provided []    Transition plan: home independently

## 2024-06-14 NOTE — ANESTHESIA POSTPROCEDURE EVALUATION
Department of Anesthesiology  Postprocedure Note    Patient: Sylvia Weller  MRN: [id number]  YOB: 1980  Date of evaluation: 6/14/2024    Procedure Summary       Date: 06/13/24 Room / Location: [address]    Anesthesia Start: 1316 Anesthesia Stop: 1722    Procedure: XI ROBOTIC LAPAROSCOPIC URETERAL REIMPLANTATION, CYSTOSCOPY, RETRO PYELOGRAM (Right: Bladder) Diagnosis:       Ureteral obstruction, right      Injury of right ureter, initial encounter      (Ureteral obstruction, right [N13.5])      (Injury of right ureter, initial encounter [S37.10XA])    Surgeons: Urban Cote Jr., MD Responsible Provider: Pavel Tim MD    Anesthesia Type: general ASA Status: 2            Anesthesia Type: No value filed.    Nam Phase I: Nam Score: 10    Nam Phase II:    POST-OP ANESTHESIA NOTE       BP 95/82   Pulse 83   Temp 97.9 °F (36.6 °C) (Oral)   Resp 18   Ht 1.575 m (5' 2\")   Wt 78.9 kg (174 lb)   LMP 02/02/2024 (Exact Date) Comment: Urine hCG done today 2/14/2024 @0711 was negative  SpO2 98%   BMI 31.83 kg/m²    Pain Assessment: None - Denies Pain  Pain Level: 10       Anesthesia Post Evaluation    Patient location during evaluation: PACU  Patient participation: complete - patient participated  Level of consciousness: awake  Pain score: 10  Airway patency: patent  Nausea & Vomiting: no nausea and no vomiting  Cardiovascular status: hemodynamically stable  Respiratory status: acceptable  Hydration status: stable  Pain management: adequate        No notable events documented.

## 2024-06-14 NOTE — DISCHARGE INSTRUCTIONS
Prostatectomy Discharge Instructions:  Take prescriptions as directed  No driving while taking narcotic pain medication. Wean narcotics as able and take plain tylenol or ibuprofen if able to take NSAIDs  No driving with fernandez catheter in place or while taking narcotics   OK to shower after discharge. OK to shower with fernandez bag or with bad disconnected from catheter  Gentle soap and water over incisions. Do not pick at surgical glue  No lifting greater than 10 lbs for 4-6 weeks (10lbs is about a gallon of water)  Walking is encouraged at home 8-10x/day or every hour you are awake  Please call or present to ED for fever >101 F, intractable nausea and vomiting, calf swelling, or uncontrolled pain.  Please call attending physician or hospital  with questions.  Follow up next week as scheduled for Fernandez removal.  Begin taking antibiotics day prior to catheter removal.

## 2024-06-14 NOTE — DISCHARGE SUMMARY
Urology Discharge Summary    DISCHARGE NOTE / SUMMARY       Demographics:  Patient Name: Sylvia Weller : 1980   MRN: [id number]    DATE OF ADMISSION: 2024  DATE OF DISCHARGE: 2024  DISCHARGE DIAGNOSES:   Principal Problem:    Ureteral stricture, right  Resolved Problems:    * No resolved hospital problems. *    CONSULTS: Interventional radiology  PROCEDURES PERFORMED: Cystoscopy, right retrograde pyelogram, cystogram, right ureteroscopy, right ureteral stent placement, robotic assisted laparoscopic right ureteral reimplantation, right percutaneous nephrostomy tube removal     HOSPITAL COURSE SUMMARY:     Per HPI:  Patient is a 43-year-old female  with right ureteral injury after abdominal hysterectomy and since then has had right ureteral stricture. On prior retrograde pyelogram from 5/10/2024 patient had area of stricture around two-thirds of the way down the ureter to the iliacs, proximal to the hiatus of the bladder.  Patient presented for the aforementioned procedure.     She was taken back to the OR on the day of admission. She underwent the above described procedure. She tolerated the procedure well and was extubated at the end of the case. She was admitted to the floor with a fernandez catheter and MELVIN drain in place.    Her recovery was unremarkable. On POD#1, IR was consulted for nephrostomy tube removal. Her MELVIN creatinine came back the same as serum levels. Her MELVIN drain was removed on POD#1 and was discharged later that day.    Exam:  /80   Pulse 70   Temp 97.7 °F (36.5 °C) (Oral)   Resp 16   Ht 1.575 m (5' 2\")   Wt 78.9 kg (174 lb)   LMP 2024 (Exact Date) Comment: Urine hCG done today 2024 @0711 was negative  SpO2 100%   BMI 31.83 kg/m²     Intake/Output Summary (Last 24 hours) at 2024 1403  Last data filed at 2024 0830  Gross per 24 hour   Intake 2070 ml   Output 4130 ml   Net -2060 ml     NAD  A/O x 3  RRR  No accessory muscles of inspiration  Abdomen soft, +

## 2024-06-14 NOTE — PLAN OF CARE
Problem: Discharge Planning  Goal: Discharge to home or other facility with appropriate resources  Outcome: Completed     Problem: Safety - Adult  Goal: Free from fall injury  Outcome: Completed     Problem: ABCDS Injury Assessment  Goal: Absence of physical injury  Outcome: Completed

## 2024-06-14 NOTE — PROGRESS NOTES
Pt arrives to IR for removal of rt neph   CM RT and MC RT to bedside  Dr Canales to room  Site prepped and draped  Access removed and site covered with dsd  Tolerated well  Return to floor

## 2024-06-17 ENCOUNTER — TELEPHONE (OUTPATIENT)
Dept: UROLOGY | Age: 44
End: 2024-06-17

## 2024-06-17 RX ORDER — DOCUSATE SODIUM 100 MG/1
CAPSULE, LIQUID FILLED ORAL
Qty: 180 CAPSULE | Refills: 1 | Status: SHIPPED | OUTPATIENT
Start: 2024-06-17

## 2024-06-17 NOTE — TELEPHONE ENCOUNTER
Spoke with patient, and informed her that per Dr. Cote, she is to have a Cystogram done on 6/20/24, and if those results are negative, she can have her catheter removed in the Clinic.

## 2024-06-18 ENCOUNTER — HOSPITAL ENCOUNTER (OUTPATIENT)
Dept: CT IMAGING | Age: 44
Discharge: HOME | End: 2024-06-20
Attending: UROLOGY
Payer: COMMERCIAL

## 2024-06-18 DIAGNOSIS — Z98.890 S/P URETERAL REIMPLANTATION: ICD-10-CM

## 2024-06-18 PROCEDURE — 6360000004 HC RX CONTRAST MEDICATION: Performed by: UROLOGY

## 2024-06-18 PROCEDURE — 72193 CT PELVIS W/DYE: CPT

## 2024-06-18 RX ADMIN — DIATRIZOATE MEGLUMINE 300 ML: 300 INJECTION, SOLUTION INTRAVENOUS at 11:01

## 2024-06-25 ENCOUNTER — TELEPHONE (OUTPATIENT)
Dept: UROLOGY | Age: 44
End: 2024-06-25

## 2024-06-25 NOTE — TELEPHONE ENCOUNTER
Patient is scheduled for surgery on 7/17 at 2:00 PM at [facility].  Left voicemail with date, time  and instructions.  Letter mailed out today.

## 2024-07-16 ENCOUNTER — ANESTHESIA EVENT (OUTPATIENT)
Dept: OPERATING ROOM | Age: 44
End: 2024-07-16
Payer: COMMERCIAL

## 2024-07-17 ENCOUNTER — ANESTHESIA (OUTPATIENT)
Dept: OPERATING ROOM | Age: 44
End: 2024-07-17
Payer: COMMERCIAL

## 2024-07-17 ENCOUNTER — APPOINTMENT (OUTPATIENT)
Dept: GENERAL RADIOLOGY | Age: 44
End: 2024-07-17
Attending: UROLOGY
Payer: COMMERCIAL

## 2024-07-17 ENCOUNTER — HOSPITAL ENCOUNTER (OUTPATIENT)
Age: 44
Setting detail: OUTPATIENT SURGERY
Discharge: HOME | End: 2024-07-17
Attending: UROLOGY | Admitting: UROLOGY
Payer: COMMERCIAL

## 2024-07-17 VITALS
TEMPERATURE: 97.2 F | SYSTOLIC BLOOD PRESSURE: 131 MMHG | HEIGHT: 61 IN | WEIGHT: 172.8 LBS | OXYGEN SATURATION: 96 % | DIASTOLIC BLOOD PRESSURE: 72 MMHG | BODY MASS INDEX: 32.62 KG/M2 | RESPIRATION RATE: 18 BRPM | HEART RATE: 59 BPM

## 2024-07-17 PROCEDURE — 3700000001 HC ADD 15 MINUTES (ANESTHESIA): Performed by: UROLOGY

## 2024-07-17 PROCEDURE — 6360000002 HC RX W HCPCS

## 2024-07-17 PROCEDURE — 2580000003 HC RX 258: Performed by: UROLOGY

## 2024-07-17 PROCEDURE — C1758 CATHETER, URETERAL: HCPCS | Performed by: UROLOGY

## 2024-07-17 PROCEDURE — C1769 GUIDE WIRE: HCPCS | Performed by: UROLOGY

## 2024-07-17 PROCEDURE — 3600000003 HC SURGERY LEVEL 3 BASE: Performed by: UROLOGY

## 2024-07-17 PROCEDURE — 7100000010 HC PHASE II RECOVERY - FIRST 15 MIN: Performed by: UROLOGY

## 2024-07-17 PROCEDURE — 6370000000 HC RX 637 (ALT 250 FOR IP)

## 2024-07-17 PROCEDURE — 3600000013 HC SURGERY LEVEL 3 ADDTL 15MIN: Performed by: UROLOGY

## 2024-07-17 PROCEDURE — 2500000003 HC RX 250 WO HCPCS: Performed by: NURSE ANESTHETIST, CERTIFIED REGISTERED

## 2024-07-17 PROCEDURE — 6360000004 HC RX CONTRAST MEDICATION: Performed by: UROLOGY

## 2024-07-17 PROCEDURE — 7100000000 HC PACU RECOVERY - FIRST 15 MIN: Performed by: UROLOGY

## 2024-07-17 PROCEDURE — 2580000003 HC RX 258: Performed by: STUDENT IN AN ORGANIZED HEALTH CARE EDUCATION/TRAINING PROGRAM

## 2024-07-17 PROCEDURE — 6360000002 HC RX W HCPCS: Performed by: UROLOGY

## 2024-07-17 PROCEDURE — 6360000002 HC RX W HCPCS: Performed by: NURSE ANESTHETIST, CERTIFIED REGISTERED

## 2024-07-17 PROCEDURE — 2709999900 HC NON-CHARGEABLE SUPPLY: Performed by: UROLOGY

## 2024-07-17 PROCEDURE — 3700000000 HC ANESTHESIA ATTENDED CARE: Performed by: UROLOGY

## 2024-07-17 PROCEDURE — 7100000011 HC PHASE II RECOVERY - ADDTL 15 MIN: Performed by: UROLOGY

## 2024-07-17 PROCEDURE — 7100000001 HC PACU RECOVERY - ADDTL 15 MIN: Performed by: UROLOGY

## 2024-07-17 RX ORDER — LABETALOL HYDROCHLORIDE 5 MG/ML
10 INJECTION, SOLUTION INTRAVENOUS
Status: DISCONTINUED | OUTPATIENT
Start: 2024-07-17 | End: 2024-07-17 | Stop reason: HOSPADM

## 2024-07-17 RX ORDER — SODIUM CHLORIDE 0.9 % (FLUSH) 0.9 %
5-40 SYRINGE (ML) INJECTION EVERY 12 HOURS SCHEDULED
Status: DISCONTINUED | OUTPATIENT
Start: 2024-07-17 | End: 2024-07-17 | Stop reason: HOSPADM

## 2024-07-17 RX ORDER — CIPROFLOXACIN 500 MG/1
500 TABLET, FILM COATED ORAL 2 TIMES DAILY
Qty: 6 TABLET | Refills: 0 | Status: SHIPPED | OUTPATIENT
Start: 2024-07-17 | End: 2024-07-20

## 2024-07-17 RX ORDER — SODIUM CHLORIDE 0.9 % (FLUSH) 0.9 %
5-40 SYRINGE (ML) INJECTION PRN
Status: DISCONTINUED | OUTPATIENT
Start: 2024-07-17 | End: 2024-07-17 | Stop reason: HOSPADM

## 2024-07-17 RX ORDER — OXYCODONE HYDROCHLORIDE AND ACETAMINOPHEN 5; 325 MG/1; MG/1
1 TABLET ORAL
Status: COMPLETED | OUTPATIENT
Start: 2024-07-17 | End: 2024-07-17

## 2024-07-17 RX ORDER — CEFAZOLIN 2 G/1
INJECTION, POWDER, FOR SOLUTION INTRAMUSCULAR; INTRAVENOUS
Status: DISCONTINUED
Start: 2024-07-17 | End: 2024-07-17 | Stop reason: HOSPADM

## 2024-07-17 RX ORDER — GLYCOPYRROLATE 0.2 MG/ML
0.4 INJECTION INTRAMUSCULAR; INTRAVENOUS ONCE
Status: DISCONTINUED | OUTPATIENT
Start: 2024-07-17 | End: 2024-07-17 | Stop reason: HOSPADM

## 2024-07-17 RX ORDER — DIPHENHYDRAMINE HYDROCHLORIDE 50 MG/ML
12.5 INJECTION INTRAMUSCULAR; INTRAVENOUS
Status: DISCONTINUED | OUTPATIENT
Start: 2024-07-17 | End: 2024-07-17 | Stop reason: HOSPADM

## 2024-07-17 RX ORDER — MIDAZOLAM HYDROCHLORIDE 2 MG/2ML
2 INJECTION, SOLUTION INTRAMUSCULAR; INTRAVENOUS
Status: DISCONTINUED | OUTPATIENT
Start: 2024-07-17 | End: 2024-07-17 | Stop reason: HOSPADM

## 2024-07-17 RX ORDER — IPRATROPIUM BROMIDE AND ALBUTEROL SULFATE 2.5; .5 MG/3ML; MG/3ML
1 SOLUTION RESPIRATORY (INHALATION)
Status: DISCONTINUED | OUTPATIENT
Start: 2024-07-17 | End: 2024-07-17 | Stop reason: HOSPADM

## 2024-07-17 RX ORDER — FENTANYL CITRATE 50 UG/ML
INJECTION, SOLUTION INTRAMUSCULAR; INTRAVENOUS PRN
Status: DISCONTINUED | OUTPATIENT
Start: 2024-07-17 | End: 2024-07-17 | Stop reason: SDUPTHER

## 2024-07-17 RX ORDER — LIDOCAINE HYDROCHLORIDE 10 MG/ML
1 INJECTION, SOLUTION EPIDURAL; INFILTRATION; INTRACAUDAL; PERINEURAL
Status: DISCONTINUED | OUTPATIENT
Start: 2024-07-17 | End: 2024-07-17 | Stop reason: HOSPADM

## 2024-07-17 RX ORDER — NALOXONE HYDROCHLORIDE 0.4 MG/ML
INJECTION, SOLUTION INTRAMUSCULAR; INTRAVENOUS; SUBCUTANEOUS PRN
Status: DISCONTINUED | OUTPATIENT
Start: 2024-07-17 | End: 2024-07-17 | Stop reason: HOSPADM

## 2024-07-17 RX ORDER — MORPHINE SULFATE 2 MG/ML
2 INJECTION, SOLUTION INTRAMUSCULAR; INTRAVENOUS EVERY 5 MIN PRN
Status: DISCONTINUED | OUTPATIENT
Start: 2024-07-17 | End: 2024-07-17 | Stop reason: HOSPADM

## 2024-07-17 RX ORDER — SODIUM CHLORIDE, SODIUM LACTATE, POTASSIUM CHLORIDE, CALCIUM CHLORIDE 600; 310; 30; 20 MG/100ML; MG/100ML; MG/100ML; MG/100ML
INJECTION, SOLUTION INTRAVENOUS CONTINUOUS
Status: DISCONTINUED | OUTPATIENT
Start: 2024-07-17 | End: 2024-07-17 | Stop reason: HOSPADM

## 2024-07-17 RX ORDER — ONDANSETRON 2 MG/ML
INJECTION INTRAMUSCULAR; INTRAVENOUS PRN
Status: DISCONTINUED | OUTPATIENT
Start: 2024-07-17 | End: 2024-07-17 | Stop reason: SDUPTHER

## 2024-07-17 RX ORDER — SODIUM CHLORIDE 9 MG/ML
INJECTION, SOLUTION INTRAVENOUS PRN
Status: DISCONTINUED | OUTPATIENT
Start: 2024-07-17 | End: 2024-07-17 | Stop reason: HOSPADM

## 2024-07-17 RX ORDER — OXYCODONE HYDROCHLORIDE AND ACETAMINOPHEN 5; 325 MG/1; MG/1
TABLET ORAL
Status: COMPLETED
Start: 2024-07-17 | End: 2024-07-17

## 2024-07-17 RX ORDER — DEXAMETHASONE SODIUM PHOSPHATE 10 MG/ML
INJECTION, SOLUTION INTRAMUSCULAR; INTRAVENOUS PRN
Status: DISCONTINUED | OUTPATIENT
Start: 2024-07-17 | End: 2024-07-17 | Stop reason: SDUPTHER

## 2024-07-17 RX ORDER — MEPERIDINE HYDROCHLORIDE 50 MG/ML
12.5 INJECTION INTRAMUSCULAR; INTRAVENOUS; SUBCUTANEOUS EVERY 5 MIN PRN
Status: DISCONTINUED | OUTPATIENT
Start: 2024-07-17 | End: 2024-07-17 | Stop reason: HOSPADM

## 2024-07-17 RX ORDER — HYDRALAZINE HYDROCHLORIDE 20 MG/ML
10 INJECTION INTRAMUSCULAR; INTRAVENOUS
Status: DISCONTINUED | OUTPATIENT
Start: 2024-07-17 | End: 2024-07-17 | Stop reason: HOSPADM

## 2024-07-17 RX ORDER — METOCLOPRAMIDE HYDROCHLORIDE 5 MG/ML
10 INJECTION INTRAMUSCULAR; INTRAVENOUS
Status: DISCONTINUED | OUTPATIENT
Start: 2024-07-17 | End: 2024-07-17 | Stop reason: HOSPADM

## 2024-07-17 RX ORDER — LIDOCAINE HYDROCHLORIDE 10 MG/ML
INJECTION, SOLUTION INFILTRATION; PERINEURAL PRN
Status: DISCONTINUED | OUTPATIENT
Start: 2024-07-17 | End: 2024-07-17 | Stop reason: SDUPTHER

## 2024-07-17 RX ORDER — PHENAZOPYRIDINE HYDROCHLORIDE 100 MG/1
100 TABLET, FILM COATED ORAL 3 TIMES DAILY PRN
Qty: 15 TABLET | Refills: 0 | Status: SHIPPED | OUTPATIENT
Start: 2024-07-17 | End: 2024-07-22

## 2024-07-17 RX ORDER — ONDANSETRON 2 MG/ML
4 INJECTION INTRAMUSCULAR; INTRAVENOUS
Status: DISCONTINUED | OUTPATIENT
Start: 2024-07-17 | End: 2024-07-17 | Stop reason: HOSPADM

## 2024-07-17 RX ORDER — PROPOFOL 10 MG/ML
INJECTION, EMULSION INTRAVENOUS PRN
Status: DISCONTINUED | OUTPATIENT
Start: 2024-07-17 | End: 2024-07-17 | Stop reason: SDUPTHER

## 2024-07-17 RX ORDER — OXYCODONE HYDROCHLORIDE AND ACETAMINOPHEN 5; 325 MG/1; MG/1
2 TABLET ORAL
Status: DISCONTINUED | OUTPATIENT
Start: 2024-07-17 | End: 2024-07-17 | Stop reason: HOSPADM

## 2024-07-17 RX ADMIN — SODIUM CHLORIDE: 9 INJECTION, SOLUTION INTRAVENOUS at 13:01

## 2024-07-17 RX ADMIN — Medication 0.5 MG: at 15:49

## 2024-07-17 RX ADMIN — FENTANYL CITRATE 50 MCG: 50 INJECTION, SOLUTION INTRAMUSCULAR; INTRAVENOUS at 15:18

## 2024-07-17 RX ADMIN — CEFAZOLIN 2000 MG: 2 INJECTION, POWDER, FOR SOLUTION INTRAMUSCULAR; INTRAVENOUS at 15:13

## 2024-07-17 RX ADMIN — DEXAMETHASONE SODIUM PHOSPHATE 10 MG: 10 INJECTION, SOLUTION INTRAMUSCULAR; INTRAVENOUS at 15:15

## 2024-07-17 RX ADMIN — FENTANYL CITRATE 50 MCG: 50 INJECTION, SOLUTION INTRAMUSCULAR; INTRAVENOUS at 15:05

## 2024-07-17 RX ADMIN — LIDOCAINE HYDROCHLORIDE 50 MG: 10 INJECTION, SOLUTION INFILTRATION; PERINEURAL at 15:05

## 2024-07-17 RX ADMIN — SODIUM CHLORIDE: 9 INJECTION, SOLUTION INTRAVENOUS at 15:31

## 2024-07-17 RX ADMIN — PROPOFOL 300 MG: 10 INJECTION, EMULSION INTRAVENOUS at 15:05

## 2024-07-17 RX ADMIN — OXYCODONE HYDROCHLORIDE AND ACETAMINOPHEN 1 TABLET: 5; 325 TABLET ORAL at 16:26

## 2024-07-17 RX ADMIN — ONDANSETRON 4 MG: 2 INJECTION INTRAMUSCULAR; INTRAVENOUS at 15:25

## 2024-07-17 RX ADMIN — HYDROMORPHONE HYDROCHLORIDE 0.5 MG: 1 INJECTION, SOLUTION INTRAMUSCULAR; INTRAVENOUS; SUBCUTANEOUS at 15:49

## 2024-07-17 ASSESSMENT — PAIN SCALES - GENERAL
PAINLEVEL_OUTOF10: 7
PAINLEVEL_OUTOF10: 6
PAINLEVEL_OUTOF10: 8

## 2024-07-17 ASSESSMENT — LIFESTYLE VARIABLES: SMOKING_STATUS: 1

## 2024-07-17 ASSESSMENT — PAIN DESCRIPTION - LOCATION
LOCATION: ABDOMEN
LOCATION: ABDOMEN

## 2024-07-17 ASSESSMENT — PAIN - FUNCTIONAL ASSESSMENT: PAIN_FUNCTIONAL_ASSESSMENT: 0-10

## 2024-07-17 ASSESSMENT — PAIN DESCRIPTION - DESCRIPTORS: DESCRIPTORS: ACHING

## 2024-07-17 NOTE — DISCHARGE INSTRUCTIONS
Discharge instructions: Cystoscopy  You May experience painful urination and see blood in the urine after your procedure.  This should resolve over time.      Pt ok to discharge home in good condition  No heavy lifting, >10 lbs for today  Pt should avoid strenuous activity for today  Pt should walk moderately at home  Pt ok to shower   Pt may resume diet as tolerated  Please call attending physician or hospital  with questions  Call or Present to ED if fever (> 101F), intractable nausea vomiting or pain.  Rx in chart     Pt should follow up with Dr. Cote , in 4-6 weeks with renal US, call to confirm appointment

## 2024-07-17 NOTE — H&P
Kera Gonzalez  Urology H&P Note     Patient:  Sylvia Weller  MRN: [id number]  YOB: 1980    ATTENDING: Urban Cote Jr, MD     CHIEF COMPLAINT:  Ureteral obstruction, s/p Reimplant    HISTORY OF PRESENT ILLNESS:   The patient is a 43 y.o. female who presents with Ureteral obstruction, s/p Reimplant    Patient's old records, notes and chart reviewed and summarized above.     Past Medical History:    Past Medical History:   Diagnosis Date    Anxiety     Arthritis     Knee-left    COVID-19 2022    severe    COVID-19 vaccine administered     Depression     Dysmenorrhea     hysterectomy feb 2024    Flank pain     History of blood transfusion     feb 21, 2024    History of bronchitis     Hydronephrosis 02/16/2024    ureteral obstruction    Migraine     Muscle spasm     Ovarian cyst     Personal history of other medical treatment     Patient wears oral braces    Under care of service provider     PCP: Beverley Lala PA-C, last visit 8/2023    Under care of service provider     Podiatry: Dallas Mabry DPM, [place], last visit 1/2024    Under care of service provider 04/24/2024    OBGYN: Ricardo Hoffman, last visit april 2024    Under care of service provider 04/24/2024    urology clinic-last visit april 2024    Uterine fibroid     hysterectomy feb 2024       Past Surgical History:    Past Surgical History:   Procedure Laterality Date    CYSTOSCOPY Right 04/12/2024    CYSTOSCOPY, RETROGRADE PYELOGRAM, STENT EXCHANGE, BALLOON DILATION,URETEROSCOPY performed by Urban Cote Jr., MD at [facility] OR    CYSTOSCOPY N/A 05/10/2024    CYSTOSCOPY, RIGHT STENT REMOVAL performed by Urban Cote Jr., MD at [facility] OR    CYSTOSCOPY W/ URETERAL STENT REMOVAL      DEBRIDEMENT Right 04/26/2024    CYSTOSCOPY RETROGRADE PYELOGRAM, STENT EXCHANGE performed by Gokul Hebert MD at [facility] OR    HYSTERECTOMY (CERVIX STATUS UNKNOWN) N/A 02/14/2024    XI ROBOTIC LAPAROSCOPIC HYSTERECTOMY, BILATERAL

## 2024-07-17 NOTE — OP NOTE
FACILITY:  [facility]   Sylvia Weller  1980  [id number]    DATE: 07/17/24  SURGEON:  Dr. Urban Cote Jr, MD , MD    ASSISTANT: Dr. Urban Cote Jr, MD MD  PREOPERATIVE DIAGNOSIS:  Right hydronephrosis.    POSTOPERATIVE DIAGNOSIS:  Same.   PROCEDURES PERFORMED:  1. Cystourethroscopy.  2. Bilateral retrograde pyelogram.  ANESTHESIA:  MAC    COMPLICATIONS:  None.   DRAINS:  None.  SPECIMEN:   Urine for cytology.  ESTIMATED BLOOD LOSS:  Less than 5 mL.      INDICATIONS FOR THE PROCEDURE:  Sylvia Weller is a 43 y.o. female presents with a history of right ureteral obstriction s/p Reimplant. The risks and benefits of the procedure, as well as possible alternatives and complications were discussed and he consented.          DETAILS OF THE PROCEDURE:  The patient was correctly identified in the preoperative holding area. The patient was brought back to the operating room and placed in the dorsal lithotomy position. Anesthesia was administered; antibiotics administered by Anesthesia.  EPC cuffs  were on and functional. The patient was then prepped and draped in the usual sterile fashion.  Once an appropriate time out had been  performed, with all parties consenting, a 22 Angolan cystoscope with a 30-degree lens was placed through the urethra into the bladder. The right ureteral orifice was cannulated with a 5 Angolan ureteral catheter which was reimplanted at the dome of the bladder.  The anastomosis look as it healed well.  Contrast was injected and the right ureter and renal pelvis were identified, with no abnormalities or filling defects.  There was contrast seen expelled from ureter and it was draining well.  A thorough and complete cystoscopy was performed with no abnormalities involving the bladder or mucosa. The bladder was drained and the cystoscope was removed. The patient tolerated the procedure well and was sent to the PACU for postoperative monitoring.      Plan:  The patient was

## 2024-07-17 NOTE — ANESTHESIA POSTPROCEDURE EVALUATION
Department of Anesthesiology  Postprocedure Note    Patient: Sylvia Weller  MRN: [id number]  YOB: 1980  Date of evaluation: 7/17/2024    Procedure Summary       Date: 07/17/24 Room / Location: [address]    Anesthesia Start: 1501 Anesthesia Stop: 1547    Procedure: CYSTOSCOPY RETROGRADE PYELOGRAM WITH RIGHT STENT REMOVAL (Right) Diagnosis:       Ureteral obstruction, right      (Ureteral obstruction, right [N13.5])    Surgeons: Urban Cote Jr., MD Responsible Provider: Frank Friedman MD    Anesthesia Type: general ASA Status: 2            Anesthesia Type: No value filed.    Nam Phase I: Nam Score: 8    Nam Phase II:      Anesthesia Post Evaluation    Patient location during evaluation: PACU  Patient participation: complete - patient participated  Level of consciousness: awake and alert  Airway patency: patent  Nausea & Vomiting: no nausea and no vomiting  Cardiovascular status: hemodynamically stable  Respiratory status: room air and spontaneous ventilation  Hydration status: euvolemic  Multimodal analgesia pain management approach  Pain management: adequate    No notable events documented.

## 2024-07-17 NOTE — ANESTHESIA PRE PROCEDURE
Department of Anesthesiology  Preprocedure Note       Name:  Sylvia Weller   Age:  43 y.o.  :  1980                                          MRN:  [id number]         Date:  2024      Surgeon: Surgeon(s):  Urban Cote Jr., MD    Procedure: Procedure(s):  CYSTOSCOPY RETROGRADE PYELOGRAM WITH RIGHT STENT REMOVAL    Medications prior to admission:   Prior to Admission medications    Medication Sig Start Date End Date Taking? Authorizing Provider   docusate sodium (COLACE) 100 MG capsule TAKE 1 CAPSULE BY MOUTH TWICE A DAY FOR 10 DAYS 24   Trevor Hathaway MD   ketorolac (TORADOL) 10 MG tablet Take 1 tablet by mouth every 6 hours as needed for Pain 24  Trevor Hathaway MD   ondansetron (ZOFRAN-ODT) 4 MG disintegrating tablet Take 1 tablet by mouth every 8 hours as needed for Nausea or Vomiting 24   Trevor Hathaway MD   hyoscyamine (LEVSIN/SL) 125 MCG sublingual tablet Place 1 tablet under the tongue every 4 hours as needed (bladder spasms/ureteral spasms) 24   Trevor Hathaway MD   albuterol sulfate HFA (VENTOLIN HFA) 108 (90 Base) MCG/ACT inhaler Inhale 2 puffs into the lungs every 6 hours as needed for Wheezing    Heriberto Qiu MD   docusate sodium (COLACE) 100 MG capsule TAKE 1 CAPSULE BY MOUTH TWICE A DAY 6/10/24   Manuel Colon MD   ferrous sulfate (FE TABS 325) 325 (65 Fe) MG EC tablet Take 1 tablet by mouth 2 times daily (with meals) 5/15/24   Safia Davis, APRN - NP   amphetamine-dextroamphetamine (ADDERALL) 15 MG tablet Take 1 tablet by mouth 2 times daily. 24   Heriberto Qiu MD   tamsulosin (FLOMAX) 0.4 MG capsule Take 1 capsule by mouth daily 24   Natividad Valencia MD   simethicone (MYLICON) 80 MG chewable tablet Take 1 tablet by mouth 4 times daily as needed for Flatulence 24   Jordyn Mg DO   ondansetron (ZOFRAN-ODT) 4 MG disintegrating tablet Take 1 tablet by mouth 3 times daily as needed for Nausea or Vomiting  Patient not

## 2024-07-18 DIAGNOSIS — N13.5 OBSTRUCTION OF RIGHT URETER: ICD-10-CM

## 2024-07-18 DIAGNOSIS — N13.1 HYDRONEPHROSIS WITH URETERAL STRICTURE, NOT ELSEWHERE CLASSIFIED: Primary | ICD-10-CM

## 2024-08-08 ENCOUNTER — HOSPITAL ENCOUNTER (OUTPATIENT)
Dept: ULTRASOUND IMAGING | Age: 44
Discharge: HOME | End: 2024-08-10
Attending: UROLOGY
Payer: COMMERCIAL

## 2024-08-08 DIAGNOSIS — N13.1 HYDRONEPHROSIS WITH URETERAL STRICTURE, NOT ELSEWHERE CLASSIFIED: ICD-10-CM

## 2024-08-08 DIAGNOSIS — N13.5 OBSTRUCTION OF RIGHT URETER: ICD-10-CM

## 2024-08-08 PROCEDURE — 76775 US EXAM ABDO BACK WALL LIM: CPT

## 2024-09-05 ENCOUNTER — APPOINTMENT (OUTPATIENT)
Dept: GENERAL RADIOLOGY | Age: 44
End: 2024-09-05
Payer: COMMERCIAL

## 2024-09-05 ENCOUNTER — HOSPITAL ENCOUNTER (EMERGENCY)
Age: 44
Discharge: HOME | End: 2024-09-05
Attending: EMERGENCY MEDICINE
Payer: COMMERCIAL

## 2024-09-05 VITALS
WEIGHT: 167 LBS | OXYGEN SATURATION: 97 % | RESPIRATION RATE: 16 BRPM | HEART RATE: 98 BPM | DIASTOLIC BLOOD PRESSURE: 73 MMHG | TEMPERATURE: 99.3 F | BODY MASS INDEX: 31.55 KG/M2 | SYSTOLIC BLOOD PRESSURE: 122 MMHG

## 2024-09-05 DIAGNOSIS — M79.672 LEFT FOOT PAIN: Primary | ICD-10-CM

## 2024-09-05 PROCEDURE — 99283 EMERGENCY DEPT VISIT LOW MDM: CPT

## 2024-09-05 PROCEDURE — 73630 X-RAY EXAM OF FOOT: CPT

## 2024-09-05 RX ORDER — TRAMADOL HYDROCHLORIDE 50 MG/1
50 TABLET ORAL EVERY 4 HOURS PRN
Qty: 15 TABLET | Refills: 0 | Status: SHIPPED | OUTPATIENT
Start: 2024-09-05 | End: 2024-09-12

## 2024-09-05 ASSESSMENT — PAIN - FUNCTIONAL ASSESSMENT: PAIN_FUNCTIONAL_ASSESSMENT: 0-10

## 2024-09-05 ASSESSMENT — PAIN SCALES - GENERAL: PAINLEVEL_OUTOF10: 9

## 2024-09-05 NOTE — ED PROVIDER NOTES
EMERGENCY DEPARTMENT ENCOUNTER    Pt Name: Sylvia Weller  MRN: [id number]  Birthdate 1980  Date of evaluation: 9/5/24  CHIEF COMPLAINT       Chief Complaint   Patient presents with    Foot Pain     Left, plantars foot. Feels like lucien horse on bottom of foot     HISTORY OF PRESENT ILLNESS   43-year-old female presents emergency room for pain at the bottom of her right foot.  Patient has been having increased pain over the last several days.  She does not recall any injuries.  She had plantar fasciitis at the beginning of the year and had tried multiple managements.  She ultimately got relief for several months with a steroid shot.  She is concerned that the plantar fasciitis has returned.             REVIEW OF SYSTEMS     Review of Systems   All other systems reviewed and are negative.    PASTMEDICAL HISTORY     Past Medical History:   Diagnosis Date    Anxiety     Arthritis     Knee-left    COVID-19 2022    severe    COVID-19 vaccine administered     Depression     Dysmenorrhea     hysterectomy feb 2024    Flank pain     History of blood transfusion     feb 21, 2024    History of bronchitis     Hydronephrosis 02/16/2024    ureteral obstruction    Migraine     Muscle spasm     Ovarian cyst     Personal history of other medical treatment     Patient wears oral braces    Under care of service provider     PCP: Beverley Lala PA-C, last visit 8/2023    Under care of service provider     Podiatry: Dallas Mabry DPM, [place], last visit 1/2024    Under care of service provider 04/24/2024    OBGYN: Ricardo Hoffman, last visit april 2024    Under care of service provider 04/24/2024    urology clinic-last visit april 2024    Uterine fibroid     hysterectomy feb 2024     Past Problem List  Patient Active Problem List   Diagnosis Code    Tobacco use Z72.0    Dysmenorrhea N94.6    Menorrhagia with regular cycle N92.0    Hydronephrosis N13.30    FREDDY (acute kidney injury) (HCC) N17.9    Flank pain R10.9

## 2024-09-06 NOTE — DISCHARGE INSTRUCTIONS
I do not recommend using tramadol within 4 hours of driving or operating machinery.    Ice the foot.  Keep it elevated when possible.  Please follow-up with your podiatrist when possible.

## 2024-09-10 ENCOUNTER — OFFICE VISIT (OUTPATIENT)
Dept: OBGYN CLINIC | Age: 44
End: 2024-09-10
Payer: COMMERCIAL

## 2024-09-10 VITALS
HEART RATE: 93 BPM | BODY MASS INDEX: 31.53 KG/M2 | SYSTOLIC BLOOD PRESSURE: 105 MMHG | HEIGHT: 61 IN | DIASTOLIC BLOOD PRESSURE: 67 MMHG | WEIGHT: 167 LBS

## 2024-09-10 DIAGNOSIS — N95.1 MENOPAUSAL SYMPTOMS: Primary | ICD-10-CM

## 2024-09-10 PROCEDURE — 99213 OFFICE O/P EST LOW 20 MIN: CPT | Performed by: STUDENT IN AN ORGANIZED HEALTH CARE EDUCATION/TRAINING PROGRAM

## 2024-09-10 PROCEDURE — 4004F PT TOBACCO SCREEN RCVD TLK: CPT | Performed by: STUDENT IN AN ORGANIZED HEALTH CARE EDUCATION/TRAINING PROGRAM

## 2024-09-10 PROCEDURE — G8417 CALC BMI ABV UP PARAM F/U: HCPCS | Performed by: STUDENT IN AN ORGANIZED HEALTH CARE EDUCATION/TRAINING PROGRAM

## 2024-09-10 PROCEDURE — G8427 DOCREV CUR MEDS BY ELIG CLIN: HCPCS | Performed by: STUDENT IN AN ORGANIZED HEALTH CARE EDUCATION/TRAINING PROGRAM

## 2024-09-10 RX ORDER — ESTRADIOL 1 MG/1
1 TABLET ORAL DAILY
Qty: 90 TABLET | Refills: 1 | Status: SHIPPED | OUTPATIENT
Start: 2024-09-10

## 2024-09-11 ENCOUNTER — OFFICE VISIT (OUTPATIENT)
Dept: PODIATRY | Age: 44
End: 2024-09-11
Payer: COMMERCIAL

## 2024-09-11 VITALS — HEIGHT: 60 IN | WEIGHT: 167 LBS | BODY MASS INDEX: 32.79 KG/M2

## 2024-09-11 DIAGNOSIS — M72.2 PLANTAR FASCIAL FIBROMATOSIS: Primary | ICD-10-CM

## 2024-09-11 DIAGNOSIS — M79.672 LEFT FOOT PAIN: ICD-10-CM

## 2024-09-11 DIAGNOSIS — B35.1 TOENAIL FUNGUS: ICD-10-CM

## 2024-09-11 PROCEDURE — G8427 DOCREV CUR MEDS BY ELIG CLIN: HCPCS | Performed by: PODIATRIST

## 2024-09-11 PROCEDURE — 99214 OFFICE O/P EST MOD 30 MIN: CPT | Performed by: PODIATRIST

## 2024-09-11 PROCEDURE — 4004F PT TOBACCO SCREEN RCVD TLK: CPT | Performed by: PODIATRIST

## 2024-09-11 PROCEDURE — L3020 FOOT LONGITUD/METATARSAL SUP: HCPCS | Performed by: PODIATRIST

## 2024-09-11 PROCEDURE — G8417 CALC BMI ABV UP PARAM F/U: HCPCS | Performed by: PODIATRIST

## 2024-09-11 RX ORDER — CICLOPIROX 80 MG/ML
SOLUTION TOPICAL
Qty: 6 ML | Refills: 1 | Status: SHIPPED | OUTPATIENT
Start: 2024-09-11

## 2024-09-11 RX ORDER — PREDNISONE 10 MG/1
TABLET ORAL
Qty: 20 TABLET | Refills: 0 | Status: SHIPPED | OUTPATIENT
Start: 2024-09-11

## 2024-10-11 ENCOUNTER — OFFICE VISIT (OUTPATIENT)
Dept: UROLOGY | Age: 44
End: 2024-10-11
Payer: COMMERCIAL

## 2024-10-11 VITALS
DIASTOLIC BLOOD PRESSURE: 90 MMHG | WEIGHT: 169 LBS | BODY MASS INDEX: 31.91 KG/M2 | HEART RATE: 62 BPM | SYSTOLIC BLOOD PRESSURE: 140 MMHG | HEIGHT: 61 IN

## 2024-10-11 DIAGNOSIS — N13.1 HYDRONEPHROSIS WITH URETERAL STRICTURE, NOT ELSEWHERE CLASSIFIED: ICD-10-CM

## 2024-10-11 DIAGNOSIS — R10.9 FLANK PAIN: ICD-10-CM

## 2024-10-11 DIAGNOSIS — N13.5 URETERAL STRICTURE, RIGHT: Primary | ICD-10-CM

## 2024-10-11 PROCEDURE — 4004F PT TOBACCO SCREEN RCVD TLK: CPT | Performed by: UROLOGY

## 2024-10-11 PROCEDURE — G8484 FLU IMMUNIZE NO ADMIN: HCPCS | Performed by: UROLOGY

## 2024-10-11 PROCEDURE — G8427 DOCREV CUR MEDS BY ELIG CLIN: HCPCS | Performed by: UROLOGY

## 2024-10-11 PROCEDURE — 99214 OFFICE O/P EST MOD 30 MIN: CPT | Performed by: UROLOGY

## 2024-10-11 PROCEDURE — G8417 CALC BMI ABV UP PARAM F/U: HCPCS | Performed by: UROLOGY

## 2024-10-11 NOTE — PROGRESS NOTES
Dr. Urban Cote Jr., MD  [facility] Progress Note      Patient:  Sylvia Weller  YOB: 1980  Date: 10/11/2024    HISTORY OF PRESENT ILLNESS:   The patient is a 43 y.o. female who presents today for follow-up for the following problem(s): Right ureteral stricture    Today visit:   No fevers, chills, nausea or vomiting  Starting to have right flank pain, sometimes 5-6/10 pain  Taking tamsulosin  SP from 8/9/2024 showed no hydronephrosis      Summary of old records:   History of right ureteral injury after abdominal hysterectomy and has had issues with ureteral strictures. S/P percutaneous nephrostomy tubes and stent placement, endoscopic dilations and stent placements. Cystoscopy with bilateral retrograde pyelogram from 7/17/2024 showed no filling defects.     Imaging Reviewed during this Office Visit:   (results were independently reviewed by physician and radiology report verified)    Urinalysis today:  No results found for this visit on 10/11/24.    Last BUN and creatinine:  Lab Results   Component Value Date    BUN 9 06/14/2024     Lab Results   Component Value Date    CREATININE 0.9 06/14/2024       PAST MEDICAL, FAMILY AND SOCIAL HISTORY UPDATE:  Past Medical History:   Diagnosis Date    Anxiety     Arthritis     Knee-left    COVID-19 2022    severe    COVID-19 vaccine administered     Depression     Dysmenorrhea     hysterectomy feb 2024    Flank pain     History of blood transfusion     feb 21, 2024    History of bronchitis     Hydronephrosis 02/16/2024    ureteral obstruction    Migraine     Muscle spasm     Ovarian cyst     Personal history of other medical treatment     Patient wears oral braces    Under care of service provider     PCP: Sachi Martel PA-C, [place], last visit 8/2023    Under care of service provider     Podiatry: Dallas Mabry DPM, [place], last visit 1/2024    Under care of service provider 04/24/2024    OBGYN: Ricardo Hoffman, last visit april

## 2024-10-21 ENCOUNTER — HOSPITAL ENCOUNTER (OUTPATIENT)
Dept: ULTRASOUND IMAGING | Facility: CLINIC | Age: 44
Discharge: HOME | End: 2024-10-23
Attending: UROLOGY
Payer: COMMERCIAL

## 2024-10-21 DIAGNOSIS — N13.5 URETERAL STRICTURE, RIGHT: ICD-10-CM

## 2024-10-21 DIAGNOSIS — N13.1 HYDRONEPHROSIS WITH URETERAL STRICTURE, NOT ELSEWHERE CLASSIFIED: ICD-10-CM

## 2024-10-21 PROCEDURE — 76770 US EXAM ABDO BACK WALL COMP: CPT

## 2024-12-10 ENCOUNTER — HOSPITAL ENCOUNTER (OUTPATIENT)
Age: 44
Setting detail: SPECIMEN
Discharge: HOME | End: 2024-12-10

## 2024-12-10 ENCOUNTER — OFFICE VISIT (OUTPATIENT)
Dept: OBGYN CLINIC | Age: 44
End: 2024-12-10
Payer: COMMERCIAL

## 2024-12-10 VITALS
WEIGHT: 165 LBS | SYSTOLIC BLOOD PRESSURE: 137 MMHG | HEART RATE: 102 BPM | BODY MASS INDEX: 31.15 KG/M2 | HEIGHT: 61 IN | DIASTOLIC BLOOD PRESSURE: 82 MMHG

## 2024-12-10 DIAGNOSIS — M54.50 MIDLINE LOW BACK PAIN WITHOUT SCIATICA, UNSPECIFIED CHRONICITY: ICD-10-CM

## 2024-12-10 DIAGNOSIS — N94.10 DYSPAREUNIA IN FEMALE: ICD-10-CM

## 2024-12-10 DIAGNOSIS — Z01.419 WELL WOMAN EXAM WITH ROUTINE GYNECOLOGICAL EXAM: Primary | ICD-10-CM

## 2024-12-10 DIAGNOSIS — Z12.11 SCREEN FOR COLON CANCER: ICD-10-CM

## 2024-12-10 DIAGNOSIS — Z11.3 SCREEN FOR STD (SEXUALLY TRANSMITTED DISEASE): ICD-10-CM

## 2024-12-10 DIAGNOSIS — Z12.31 SCREENING MAMMOGRAM FOR BREAST CANCER: ICD-10-CM

## 2024-12-10 PROCEDURE — 99459 PELVIC EXAMINATION: CPT | Performed by: STUDENT IN AN ORGANIZED HEALTH CARE EDUCATION/TRAINING PROGRAM

## 2024-12-10 PROCEDURE — G8484 FLU IMMUNIZE NO ADMIN: HCPCS | Performed by: STUDENT IN AN ORGANIZED HEALTH CARE EDUCATION/TRAINING PROGRAM

## 2024-12-10 PROCEDURE — 99396 PREV VISIT EST AGE 40-64: CPT | Performed by: STUDENT IN AN ORGANIZED HEALTH CARE EDUCATION/TRAINING PROGRAM

## 2024-12-10 NOTE — PROGRESS NOTES
[facility]  [facility]  [address]  [place] [address]      Sylvia Weller  12/10/2024                       Primary Care Physician: Sachi Martel PA    CC:   Chief Complaint   Patient presents with    Annual Exam         HPI: Sylvia Weller is a 44 y.o. female  Patient's last menstrual period was 2024 (exact date).    The patient was seen and examined. She is here for an annual visit. She is complaining of pain with deep penetration since her hysterectomy.     Patient is s/p hysterectomy and denies bleeding.     Her bowel habits are regular. She denies any bloating.  She denies dysuria. She denies urinary leaking.  She denies vaginal discharge.  She does endorse some back pain and would like to be checked for a UTI.    Depression Screen: Symptoms of decreased mood absent  Symptoms of anhedonia absent  **If either question is answered in a  positive fashion then complete the PHQ9 Scoring Evaluation and make the appropriate referral**    REVIEW OF SYSTEMS:   Constitutional: negative fever, negative chills  HEENT: negative visual disturbances, negative headaches  Respiratory: negative dyspnea, negative cough  Cardiovascular: negative chest pain,  negative palpitations  Gastrointestinal: negative abdominal pain, negative RUQ pain, negative N/V, negative diarrhea, negative constipation  Genitourinary: negative dysuria, negative vaginal discharge, positive dyspareunia  Dermatological: negative rash  Hematologic: negative bruising  Immunologic/Lymphatic: negative recent illness, negative recent sick contact  Musculoskeletal: positive low back pain, negative myalgias, negative arthralgias  Neurological:  negative dizziness, negative weakness  Behavior/Psych: negative depression, negative anxiety      GYNECOLOGICAL HISTORY:  Age of Menarche: 11  Age of Menopause: 44     STD History: no past history    Permanent Sterilization: yes - MELISSA   Reversible Birth Control:

## 2024-12-10 NOTE — PROGRESS NOTES
Chaperone for Intimate Exam  Chaperone was offered as part of the rooming process. Patient declined and agrees to continue with exam without a chaperone.  Chaperone: None

## 2024-12-11 LAB
CANDIDA SPECIES: NEGATIVE
GARDNERELLA VAGINALIS: NEGATIVE
SOURCE: NORMAL
TRICHOMONAS: NEGATIVE

## 2024-12-12 DIAGNOSIS — N30.00 ACUTE CYSTITIS WITHOUT HEMATURIA: Primary | ICD-10-CM

## 2024-12-12 LAB
C TRACH DNA SPEC QL PROBE+SIG AMP: NEGATIVE
MICROORGANISM SPEC CULT: ABNORMAL
N GONORRHOEA DNA SPEC QL PROBE+SIG AMP: NEGATIVE
SERVICE CMNT-IMP: ABNORMAL
SPECIMEN DESCRIPTION: ABNORMAL
SPECIMEN DESCRIPTION: NORMAL

## 2024-12-12 RX ORDER — LEVOFLOXACIN 250 MG/1
250 TABLET, FILM COATED ORAL DAILY
Qty: 5 TABLET | Refills: 0 | Status: SHIPPED | OUTPATIENT
Start: 2024-12-12 | End: 2024-12-17

## 2025-01-02 ENCOUNTER — TELEPHONE (OUTPATIENT)
Dept: GASTROENTEROLOGY | Age: 45
End: 2025-01-02

## 2025-01-02 NOTE — TELEPHONE ENCOUNTER
Attempt 1 - writer lvm to schedule colonoscopy/may be schedule with any provider  Attempt 2 - colon letter mailed.

## 2025-01-10 ENCOUNTER — TELEPHONE (OUTPATIENT)
Dept: PODIATRY | Age: 45
End: 2025-01-10

## 2025-01-10 ENCOUNTER — PREP FOR PROCEDURE (OUTPATIENT)
Dept: GASTROENTEROLOGY | Age: 45
End: 2025-01-10

## 2025-01-10 DIAGNOSIS — Z12.11 COLON CANCER SCREENING: ICD-10-CM

## 2025-01-10 RX ORDER — POLYETHYLENE GLYCOL 3350 17 G/17G
POWDER, FOR SOLUTION ORAL
Qty: 238 G | Refills: 0 | Status: SHIPPED | OUTPATIENT
Start: 2025-01-10

## 2025-01-10 RX ORDER — BISACODYL 5 MG/1
TABLET, DELAYED RELEASE ORAL
Qty: 4 TABLET | Refills: 0 | Status: SHIPPED | OUTPATIENT
Start: 2025-01-10

## 2025-01-10 NOTE — TELEPHONE ENCOUNTER
Procedure scheduled/Dr DOUG Ruiz  Procedure:colon  Dx:screening  Date:06/18/2025  Time:1130 am  Hospital:[facility]  Bowel Prep instructions given:miralax/dulc  In office/via phone: phone  Clearance needed:none

## 2025-01-10 NOTE — TELEPHONE ENCOUNTER
Patient is requesting a call regarding her insoles and can be reached at [phone] . Okay to leave a message.

## 2025-01-16 ENCOUNTER — HOSPITAL ENCOUNTER (OUTPATIENT)
Dept: PHYSICAL THERAPY | Facility: CLINIC | Age: 45
Setting detail: THERAPIES SERIES
Discharge: HOME | End: 2025-01-16

## 2025-01-16 ENCOUNTER — OFFICE VISIT (OUTPATIENT)
Dept: PODIATRY | Age: 45
End: 2025-01-16

## 2025-01-16 VITALS — WEIGHT: 165 LBS | BODY MASS INDEX: 32.39 KG/M2 | HEIGHT: 60 IN

## 2025-01-16 DIAGNOSIS — M72.2 PLANTAR FASCIAL FIBROMATOSIS: Primary | ICD-10-CM

## 2025-01-16 NOTE — PROGRESS NOTES
Patient presents today for Orthotics dispenses. 1 Pair of orthotics was dispensed, break-in discussed and patient tolerated well.      Leticia Baig

## 2025-01-16 NOTE — FLOWSHEET NOTE
[] [facility]  Outpatient Rehabilitation &  Therapy  [address]  [phone]  [phone] [x] [facility]  Outpatient Rehabilitation &  Therapy  [address]  P: [phone]  F: [phone] [] [facility]  Outpatient Rehabilitation &  Therapy  [address]  P: [phone]  F: [phone] [] [facility]  Outpatient Rehabilitation &  Therapy  [address]  [phone]  [phone] [] [facility]  Outpatient Rehabilitation &  Therapy  [address]   P: [phone]  F: [phone]  [] [facility]  Outpatient Rehabilitation &  Therapy  [address]  P: [phone]  F: [phone] [] [facility]  Outpatient Rehabilitation &  Therapy  [address]  P: [phone]  F: [phone] [] [facility]  Outpatient Rehabilitation &  Therapy  [address]  P: [phone]  F: [phone] [] [facility]  Outpatient Rehabilitation &  Therapy  [address]  P: [phone]  F: [phone]  [] [facility] Outpatient Rehabilitation &  Therapy  [address]  P: [phone]  F: [phone]     Therapy Cancel/No Show note    Date: 2025  Patient: Sylvia Weller  : 1980  MRN: [id number]    Cancels/No Shows to date: 0/0- will not count against pt as the reason for cancel was due to insurance being OON    For today's appointment patient:    [x]  Cancelled    [] Rescheduled appointment    [] No-show    Reason given by patient:    []  Patient ill    []  Conflicting appointment    [] No transportation      [] Conflict with work    [] No reason given    [] Weather related    [] COVID-19    [x] Other:      Comments:  will not count against pt as the reason for cancel was due to insurance being OON      [] Next appointment was

## 2025-01-23 ENCOUNTER — OFFICE VISIT (OUTPATIENT)
Dept: PODIATRY | Age: 45
End: 2025-01-23
Payer: COMMERCIAL

## 2025-01-23 VITALS — BODY MASS INDEX: 31.15 KG/M2 | WEIGHT: 165 LBS | HEIGHT: 61 IN

## 2025-01-23 DIAGNOSIS — M79.672 LEFT FOOT PAIN: Primary | ICD-10-CM

## 2025-01-23 DIAGNOSIS — M72.2 PLANTAR FASCIAL FIBROMATOSIS: ICD-10-CM

## 2025-01-23 PROCEDURE — G8417 CALC BMI ABV UP PARAM F/U: HCPCS | Performed by: PODIATRIST

## 2025-01-23 PROCEDURE — 20550 NJX 1 TENDON SHEATH/LIGAMENT: CPT | Performed by: PODIATRIST

## 2025-01-23 PROCEDURE — G8427 DOCREV CUR MEDS BY ELIG CLIN: HCPCS | Performed by: PODIATRIST

## 2025-01-23 PROCEDURE — 4004F PT TOBACCO SCREEN RCVD TLK: CPT | Performed by: PODIATRIST

## 2025-01-23 PROCEDURE — 99214 OFFICE O/P EST MOD 30 MIN: CPT | Performed by: PODIATRIST

## 2025-01-23 RX ORDER — BETAMETHASONE SODIUM PHOSPHATE AND BETAMETHASONE ACETATE 3; 3 MG/ML; MG/ML
6 INJECTION, SUSPENSION INTRA-ARTICULAR; INTRALESIONAL; INTRAMUSCULAR; SOFT TISSUE ONCE
Status: COMPLETED | OUTPATIENT
Start: 2025-01-23 | End: 2025-01-23

## 2025-01-23 RX ADMIN — BETAMETHASONE SODIUM PHOSPHATE AND BETAMETHASONE ACETATE 6 MG: 3; 3 INJECTION, SUSPENSION INTRA-ARTICULAR; INTRALESIONAL; INTRAMUSCULAR; SOFT TISSUE at 15:46

## 2025-01-23 NOTE — PROGRESS NOTES
[facility] PODIATRY [address]  [address]  [facility] [address]  Dept: [phone]  Dept Fax: [phone]    RETURN PATIENT PROGRESS NOTE  Date of patient's visit: 1/23/2025  Patient's Name:  Sylvia Weller YOB: 1980            Patient Care Team:  Sachi Martel PA as PCP - General (Specialist)       Sylvia Weller 44 y.o. female that presents for follow-up of   Chief Complaint   Patient presents with    Foot Pain     Left foot, would like injection       Symptoms began 1+ month(s) ago and are unchanged .  Patient relates pain is Present.  Pain is rated 9 out of 10 and is described as constant, severe, excruciating.  Treatments prior to today's visit include: previous podiatry treatment, would like injection today.  Currently denies F/C/N/V.     No Known Allergies    Past Medical History:   Diagnosis Date    Anxiety     Arthritis     Knee-left    COVID-19 2022    severe    COVID-19 vaccine administered     Depression     Dysmenorrhea     hysterectomy feb 2024    Flank pain     History of blood transfusion     feb 21, 2024    History of bronchitis     Hydronephrosis 02/16/2024    ureteral obstruction    Migraine     Muscle spasm     Ovarian cyst     Personal history of other medical treatment     Patient wears oral braces    Under care of service provider     PCP: Sachi Martel PA-C, [facility], last visit 8/2023    Under care of service provider     Podiatry: Dallas Mabry DPM, [place], last visit 1/2024    Under care of service provider 04/24/2024    OBGYN: -[facility], last visit april 2024    Under care of service provider 04/24/2024    urology clinic-last visit april 2024    Uterine fibroid     hysterectomy feb 2024       Prior to Admission medications    Medication Sig Start Date End Date Taking? Authorizing Provider   polyethylene glycol (GLYCOLAX) 17 GM/SCOOP powder Please follow instructions as given to you by your

## 2025-02-09 PROBLEM — Z12.11 COLON CANCER SCREENING: Status: RESOLVED | Noted: 2025-01-10 | Resolved: 2025-02-09

## 2025-04-11 ENCOUNTER — OFFICE VISIT (OUTPATIENT)
Dept: UROLOGY | Age: 45
End: 2025-04-11
Payer: COMMERCIAL

## 2025-04-11 VITALS
WEIGHT: 165 LBS | HEIGHT: 61 IN | SYSTOLIC BLOOD PRESSURE: 115 MMHG | BODY MASS INDEX: 31.15 KG/M2 | HEART RATE: 78 BPM | DIASTOLIC BLOOD PRESSURE: 68 MMHG

## 2025-04-11 DIAGNOSIS — N13.5 OBSTRUCTION OF RIGHT URETER: ICD-10-CM

## 2025-04-11 DIAGNOSIS — N13.5 URETERAL STRICTURE, RIGHT: Primary | ICD-10-CM

## 2025-04-11 DIAGNOSIS — N13.1 HYDRONEPHROSIS WITH URETERAL STRICTURE, NOT ELSEWHERE CLASSIFIED: ICD-10-CM

## 2025-04-11 DIAGNOSIS — R10.9 FLANK PAIN: ICD-10-CM

## 2025-04-11 LAB
APPEARANCE FLUID: ABNORMAL
BILIRUBIN, POC: NEGATIVE
BLOOD URINE, POC: ABNORMAL
CLARITY, POC: ABNORMAL
COLOR, POC: YELLOW
GLUCOSE URINE, POC: NEGATIVE MG/DL
KETONES, POC: NEGATIVE MG/DL
LEUKOCYTE EST, POC: ABNORMAL
NITRITE, POC: POSITIVE
PH, POC: 6.5
PROTEIN, POC: NEGATIVE MG/DL
SPECIFIC GRAVITY, POC: 1.02
UROBILINOGEN, POC: 1 MG/DL

## 2025-04-11 PROCEDURE — 81002 URINALYSIS NONAUTO W/O SCOPE: CPT | Performed by: UROLOGY

## 2025-04-11 PROCEDURE — 99213 OFFICE O/P EST LOW 20 MIN: CPT | Performed by: UROLOGY

## 2025-04-11 PROCEDURE — G8417 CALC BMI ABV UP PARAM F/U: HCPCS | Performed by: UROLOGY

## 2025-04-11 PROCEDURE — G8427 DOCREV CUR MEDS BY ELIG CLIN: HCPCS | Performed by: UROLOGY

## 2025-04-11 PROCEDURE — 4004F PT TOBACCO SCREEN RCVD TLK: CPT | Performed by: UROLOGY

## 2025-04-11 RX ORDER — RIZATRIPTAN BENZOATE 10 MG/1
TABLET, ORALLY DISINTEGRATING ORAL
COMMUNITY
Start: 2025-04-09

## 2025-04-11 RX ORDER — DOXYCYCLINE HYCLATE 100 MG
100 TABLET ORAL 2 TIMES DAILY
Qty: 14 TABLET | Refills: 0 | Status: SHIPPED | OUTPATIENT
Start: 2025-04-11 | End: 2025-04-18

## 2025-04-11 RX ORDER — CYCLOBENZAPRINE HCL 5 MG
5 TABLET ORAL EVERY 8 HOURS PRN
COMMUNITY
Start: 2025-01-23

## 2025-04-11 NOTE — PROGRESS NOTES
CYSTOSCOPY, RETRO PYELOGRAM performed by Urban Cote Jr., MD at [facility] OR    URETER SURGERY Right 04/26/2024    URETEROSCOPY performed by Gokul Hebert MD at [facility] [place] OR    URETERAL REIMPLANTION Right 06/13/2024    XI ROBOTIC LAPAROSCOPIC URETERAL REIMPLANTATION, CYSTOSCOPY, RETRO PYELOGRAM (Right: Bladder)     Family History   Problem Relation Age of Onset    Other Mother         possible ovarian Ca 8/03/23 pending pending bx    Hypertension Mother     COPD Mother         smoker    Arthritis Mother     Cirrhosis Father     Alcohol Abuse Father     Other Sister         blood disorder, low RBC ct    Asthma Brother     Inflam Bowel Dis Brother     No Known Problems Brother     No Known Problems Brother     Heart Disease Maternal Grandmother     Heart Failure Maternal Grandmother     Cancer Maternal Grandfather     No Known Problems Paternal Grandmother     No Known Problems Paternal Grandfather      Outpatient Medications Marked as Taking for the 4/11/25 encounter (Office Visit) with Urban Cote Jr., MD   Medication Sig Dispense Refill    cyclobenzaprine (FLEXERIL) 5 MG tablet Take 1 tablet by mouth every 8 hours as needed for Muscle spasms      rizatriptan (MAXALT-MLT) 10 MG disintegrating tablet       polyethylene glycol (GLYCOLAX) 17 GM/SCOOP powder Please follow instructions as given to you by your provider 238 g 0    bisacodyl 5 MG EC tablet Please follow instructions as given to you by your provider 4 tablet 0    predniSONE (DELTASONE) 10 MG tablet Take one PO TID x 3 days Take one PO BID x 3 days Take one PO qDaily x 3 days Take 1/2 PO qdaily x 3 days 20 tablet 0    ciclopirox (PENLAC) 8 % solution Apply topically nightly. Remove once weekly with alcohol or nail polish remover. 6 mL 1    estradiol (ESTRACE) 1 MG tablet Take 1 tablet by mouth daily 90 tablet 1    docusate sodium (COLACE) 100 MG capsule TAKE 1 CAPSULE BY MOUTH TWICE A DAY FOR 10 DAYS 180 capsule 1    ketorolac (TORADOL) 10 MG tablet Take

## 2025-05-22 ENCOUNTER — OFFICE VISIT (OUTPATIENT)
Dept: PODIATRY | Age: 45
End: 2025-05-22
Payer: COMMERCIAL

## 2025-05-22 VITALS — WEIGHT: 168 LBS | BODY MASS INDEX: 31.72 KG/M2 | HEIGHT: 61 IN

## 2025-05-22 DIAGNOSIS — M72.2 PLANTAR FASCIAL FIBROMATOSIS: Primary | ICD-10-CM

## 2025-05-22 DIAGNOSIS — M79.672 LEFT FOOT PAIN: ICD-10-CM

## 2025-05-22 DIAGNOSIS — M72.2 PLANTAR FASCIAL FIBROMATOSIS: ICD-10-CM

## 2025-05-22 DIAGNOSIS — M79.672 LEFT FOOT PAIN: Primary | ICD-10-CM

## 2025-05-22 PROCEDURE — 99214 OFFICE O/P EST MOD 30 MIN: CPT | Performed by: PODIATRIST

## 2025-05-22 PROCEDURE — 4004F PT TOBACCO SCREEN RCVD TLK: CPT | Performed by: PODIATRIST

## 2025-05-22 PROCEDURE — G8427 DOCREV CUR MEDS BY ELIG CLIN: HCPCS | Performed by: PODIATRIST

## 2025-05-22 PROCEDURE — G8417 CALC BMI ABV UP PARAM F/U: HCPCS | Performed by: PODIATRIST

## 2025-05-22 RX ORDER — TRAMADOL HYDROCHLORIDE 50 MG/1
50 TABLET ORAL EVERY 4 HOURS PRN
Qty: 42 TABLET | Refills: 0 | Status: SHIPPED | OUTPATIENT
Start: 2025-05-22 | End: 2025-05-29

## 2025-05-22 NOTE — PROGRESS NOTES
[facility] PODIATRY [address]  [address]  [place] [id number]  Dept: [phone]  Dept Fax: [phone]    RETURN PATIENT PROGRESS NOTE  Date of patient's visit: 5/22/2025  Patient's Name:  Sylvia Weller YOB: 1980            Patient Care Team:  Sachi Martel PA as PCP - General (Specialist)       Sylvia Weller 44 y.o. female that presents for follow-up of   Chief Complaint   Patient presents with    Foot Pain     Bilateral foot       Symptoms began 6 + month(s) ago and are unchanged .  Patient relates pain is Present.  Pain is rated 10 out of 10 and is described as constant, severe, excruciating.  Treatments prior to today's visit include: previous podiatry treatment.  Currently denies F/C/N/V.     No Known Allergies    Past Medical History:   Diagnosis Date    Anxiety     Arthritis     Knee-left    COVID-19 2022    severe    COVID-19 vaccine administered     Depression     Dysmenorrhea     hysterectomy feb 2024    Flank pain     History of blood transfusion     feb 21, 2024    History of bronchitis     Hydronephrosis 02/16/2024    ureteral obstruction    Migraine     Muscle spasm     Ovarian cyst     Personal history of other medical treatment     Patient wears oral braces    Under care of service provider     PCP: Sachi Martel PA-C, [facility], last visit 8/2023    Under care of service provider     Podiatry: Dallas Mabry DPM, [place], last visit 1/2024    Under care of service provider 04/24/2024    OBGYN: Ricardo Hoffman, last visit april 2024    Under care of service provider 04/24/2024    urology clinic-last visit april 2024    Uterine fibroid     hysterectomy feb 2024       Prior to Admission medications    Medication Sig Start Date End Date Taking? Authorizing Provider   traMADol (ULTRAM) 50 MG tablet Take 1 tablet by mouth every 4 hours as needed for Pain for up to 7 days. Intended supply: 7 days. Take lowest

## 2025-05-30 ENCOUNTER — HOSPITAL ENCOUNTER (EMERGENCY)
Age: 45
Discharge: HOME | End: 2025-05-30
Attending: EMERGENCY MEDICINE
Payer: COMMERCIAL

## 2025-05-30 ENCOUNTER — APPOINTMENT (OUTPATIENT)
Dept: CT IMAGING | Age: 45
End: 2025-05-30
Payer: COMMERCIAL

## 2025-05-30 VITALS
WEIGHT: 160 LBS | TEMPERATURE: 98.4 F | RESPIRATION RATE: 16 BRPM | BODY MASS INDEX: 30.21 KG/M2 | HEART RATE: 83 BPM | HEIGHT: 61 IN | OXYGEN SATURATION: 98 % | DIASTOLIC BLOOD PRESSURE: 84 MMHG | SYSTOLIC BLOOD PRESSURE: 126 MMHG

## 2025-05-30 DIAGNOSIS — N30.01 ACUTE CYSTITIS WITH HEMATURIA: Primary | ICD-10-CM

## 2025-05-30 LAB
ALBUMIN SERPL-MCNC: 4 G/DL (ref 3.5–5.2)
ALBUMIN/GLOB SERPL: 1.6 {RATIO} (ref 1–2.5)
ALP SERPL-CCNC: 48 U/L (ref 35–104)
ALT SERPL-CCNC: 13 U/L (ref 10–35)
ANION GAP SERPL CALCULATED.3IONS-SCNC: 11 MMOL/L (ref 9–16)
AST SERPL-CCNC: 17 U/L (ref 10–35)
BACTERIA URNS QL MICRO: ABNORMAL
BASOPHILS # BLD: <0.03 K/UL (ref 0–0.2)
BASOPHILS NFR BLD: 0 % (ref 0–2)
BILIRUB SERPL-MCNC: <0.2 MG/DL (ref 0–1.2)
BILIRUB UR QL STRIP: NEGATIVE
BUN SERPL-MCNC: 15 MG/DL (ref 6–20)
CALCIUM SERPL-MCNC: 9 MG/DL (ref 8.6–10.4)
CHLORIDE SERPL-SCNC: 102 MMOL/L (ref 98–107)
CLARITY UR: ABNORMAL
CO2 SERPL-SCNC: 26 MMOL/L (ref 20–31)
COLOR UR: YELLOW
CREAT SERPL-MCNC: 0.9 MG/DL (ref 0.5–0.9)
EOSINOPHIL # BLD: 0.04 K/UL (ref 0–0.44)
EOSINOPHILS RELATIVE PERCENT: 0 % (ref 1–4)
EPI CELLS #/AREA URNS HPF: ABNORMAL /HPF (ref 0–5)
ERYTHROCYTE [DISTWIDTH] IN BLOOD BY AUTOMATED COUNT: 14.3 % (ref 11.8–14.4)
GFR, ESTIMATED: 81 ML/MIN/1.73M2
GLUCOSE SERPL-MCNC: 94 MG/DL (ref 74–99)
GLUCOSE UR STRIP-MCNC: NEGATIVE MG/DL
HCT VFR BLD AUTO: 33 % (ref 36.3–47.1)
HGB BLD-MCNC: 10.8 G/DL (ref 11.9–15.1)
HGB UR QL STRIP.AUTO: ABNORMAL
IMM GRANULOCYTES # BLD AUTO: 0.03 K/UL (ref 0–0.3)
IMM GRANULOCYTES NFR BLD: 0 %
KETONES UR STRIP-MCNC: NEGATIVE MG/DL
LEUKOCYTE ESTERASE UR QL STRIP: ABNORMAL
LYMPHOCYTES NFR BLD: 1.67 K/UL (ref 1.1–3.7)
LYMPHOCYTES RELATIVE PERCENT: 18 % (ref 24–43)
MCH RBC QN AUTO: 29.9 PG (ref 25.2–33.5)
MCHC RBC AUTO-ENTMCNC: 32.7 G/DL (ref 28.4–34.8)
MCV RBC AUTO: 91.4 FL (ref 82.6–102.9)
MONOCYTES NFR BLD: 0.94 K/UL (ref 0.1–1.2)
MONOCYTES NFR BLD: 10 % (ref 3–12)
NEUTROPHILS NFR BLD: 72 % (ref 36–65)
NEUTS SEG NFR BLD: 6.76 K/UL (ref 1.5–8.1)
NITRITE UR QL STRIP: POSITIVE
NRBC BLD-RTO: 0 PER 100 WBC
PH UR STRIP: 6.5 [PH] (ref 5–8)
PLATELET # BLD AUTO: 323 K/UL (ref 138–453)
PMV BLD AUTO: 10.8 FL (ref 8.1–13.5)
POTASSIUM SERPL-SCNC: 3.2 MMOL/L (ref 3.7–5.3)
PROT SERPL-MCNC: 6.4 G/DL (ref 6.6–8.7)
PROT UR STRIP-MCNC: NEGATIVE MG/DL
RBC # BLD AUTO: 3.61 M/UL (ref 3.95–5.11)
RBC #/AREA URNS HPF: ABNORMAL /HPF (ref 0–2)
SODIUM SERPL-SCNC: 139 MMOL/L (ref 136–145)
SP GR UR STRIP: 1.03 (ref 1–1.03)
UROBILINOGEN UR STRIP-ACNC: NORMAL EU/DL (ref 0–1)
WBC #/AREA URNS HPF: ABNORMAL /HPF (ref 0–5)
WBC OTHER # BLD: 9.5 K/UL (ref 3.5–11.3)

## 2025-05-30 PROCEDURE — 96374 THER/PROPH/DIAG INJ IV PUSH: CPT

## 2025-05-30 PROCEDURE — 99285 EMERGENCY DEPT VISIT HI MDM: CPT

## 2025-05-30 PROCEDURE — 80053 COMPREHEN METABOLIC PANEL: CPT

## 2025-05-30 PROCEDURE — 6360000004 HC RX CONTRAST MEDICATION: Performed by: EMERGENCY MEDICINE

## 2025-05-30 PROCEDURE — 81001 URINALYSIS AUTO W/SCOPE: CPT

## 2025-05-30 PROCEDURE — 2500000003 HC RX 250 WO HCPCS: Performed by: EMERGENCY MEDICINE

## 2025-05-30 PROCEDURE — 36415 COLL VENOUS BLD VENIPUNCTURE: CPT

## 2025-05-30 PROCEDURE — 85025 COMPLETE CBC W/AUTO DIFF WBC: CPT

## 2025-05-30 PROCEDURE — 74178 CT ABD&PLV WO CNTR FLWD CNTR: CPT | Performed by: EMERGENCY MEDICINE

## 2025-05-30 PROCEDURE — 6360000002 HC RX W HCPCS: Performed by: EMERGENCY MEDICINE

## 2025-05-30 PROCEDURE — 96375 TX/PRO/DX INJ NEW DRUG ADDON: CPT

## 2025-05-30 PROCEDURE — 2580000003 HC RX 258: Performed by: EMERGENCY MEDICINE

## 2025-05-30 RX ORDER — 0.9 % SODIUM CHLORIDE 0.9 %
80 INTRAVENOUS SOLUTION INTRAVENOUS ONCE
Status: COMPLETED | OUTPATIENT
Start: 2025-05-30 | End: 2025-05-30

## 2025-05-30 RX ORDER — CEPHALEXIN 500 MG/1
500 CAPSULE ORAL 4 TIMES DAILY
Qty: 28 CAPSULE | Refills: 0 | Status: SHIPPED | OUTPATIENT
Start: 2025-05-30 | End: 2025-06-06

## 2025-05-30 RX ORDER — IOPAMIDOL 755 MG/ML
125 INJECTION, SOLUTION INTRAVASCULAR
Status: COMPLETED | OUTPATIENT
Start: 2025-05-30 | End: 2025-05-30

## 2025-05-30 RX ORDER — SODIUM CHLORIDE 0.9 % (FLUSH) 0.9 %
10 SYRINGE (ML) INJECTION PRN
Status: DISCONTINUED | OUTPATIENT
Start: 2025-05-30 | End: 2025-05-31 | Stop reason: HOSPADM

## 2025-05-30 RX ORDER — ONDANSETRON 2 MG/ML
4 INJECTION INTRAMUSCULAR; INTRAVENOUS ONCE
Status: COMPLETED | OUTPATIENT
Start: 2025-05-30 | End: 2025-05-30

## 2025-05-30 RX ORDER — KETOROLAC TROMETHAMINE 15 MG/ML
15 INJECTION, SOLUTION INTRAMUSCULAR; INTRAVENOUS ONCE
Status: COMPLETED | OUTPATIENT
Start: 2025-05-30 | End: 2025-05-30

## 2025-05-30 RX ORDER — MORPHINE SULFATE 4 MG/ML
4 INJECTION, SOLUTION INTRAMUSCULAR; INTRAVENOUS ONCE
Refills: 0 | Status: COMPLETED | OUTPATIENT
Start: 2025-05-30 | End: 2025-05-30

## 2025-05-30 RX ORDER — 0.9 % SODIUM CHLORIDE 0.9 %
1000 INTRAVENOUS SOLUTION INTRAVENOUS ONCE
Status: COMPLETED | OUTPATIENT
Start: 2025-05-30 | End: 2025-05-30

## 2025-05-30 RX ADMIN — WATER 1000 MG: 1 INJECTION INTRAMUSCULAR; INTRAVENOUS; SUBCUTANEOUS at 22:56

## 2025-05-30 RX ADMIN — MORPHINE SULFATE 4 MG: 4 INJECTION, SOLUTION INTRAMUSCULAR; INTRAVENOUS at 20:23

## 2025-05-30 RX ADMIN — IOPAMIDOL 125 ML: 755 INJECTION, SOLUTION INTRAVENOUS at 20:54

## 2025-05-30 RX ADMIN — ONDANSETRON 4 MG: 2 INJECTION, SOLUTION INTRAMUSCULAR; INTRAVENOUS at 20:35

## 2025-05-30 RX ADMIN — SODIUM CHLORIDE, PRESERVATIVE FREE 10 ML: 5 INJECTION INTRAVENOUS at 20:55

## 2025-05-30 RX ADMIN — SODIUM CHLORIDE 1000 ML: 0.9 INJECTION, SOLUTION INTRAVENOUS at 20:22

## 2025-05-30 RX ADMIN — SODIUM CHLORIDE 80 ML: 9 INJECTION, SOLUTION INTRAVENOUS at 20:55

## 2025-05-30 RX ADMIN — KETOROLAC TROMETHAMINE 15 MG: 15 INJECTION, SOLUTION INTRAMUSCULAR; INTRAVENOUS at 22:56

## 2025-05-30 ASSESSMENT — PAIN DESCRIPTION - LOCATION: LOCATION: BACK

## 2025-05-30 ASSESSMENT — PAIN DESCRIPTION - PAIN TYPE: TYPE: ACUTE PAIN

## 2025-05-30 ASSESSMENT — PAIN - FUNCTIONAL ASSESSMENT
PAIN_FUNCTIONAL_ASSESSMENT: 0-10
PAIN_FUNCTIONAL_ASSESSMENT: INTOLERABLE, UNABLE TO DO ANY ACTIVE OR PASSIVE ACTIVITIES

## 2025-05-30 ASSESSMENT — ENCOUNTER SYMPTOMS
VOMITING: 0
SHORTNESS OF BREATH: 0
ABDOMINAL PAIN: 0
DIARRHEA: 0
SORE THROAT: 0
EYE REDNESS: 0
EYE PAIN: 0
COUGH: 0
BACK PAIN: 0

## 2025-05-30 ASSESSMENT — PAIN DESCRIPTION - DESCRIPTORS: DESCRIPTORS: CRAMPING;SORE

## 2025-05-30 ASSESSMENT — PAIN SCALES - GENERAL
PAINLEVEL_OUTOF10: 8
PAINLEVEL_OUTOF10: 10
PAINLEVEL_OUTOF10: 10

## 2025-05-30 ASSESSMENT — PAIN DESCRIPTION - ORIENTATION: ORIENTATION: RIGHT

## 2025-05-30 NOTE — ED TRIAGE NOTES
Mode of arrival (squad #, walk in, police, etc) : walk in        Chief complaint(s): right back/flank pain        Arrival Note (brief scenario, treatment PTA, etc).: previous injury from surgical complication to right ureter.

## 2025-05-30 NOTE — ED PROVIDER NOTES
[facility] EMERGENCY DEPARTMENT  EMERGENCY MEDICINE     Pt Name: Sylvia Weller  MRN: [id number]  Birthdate 1980  Date of evaluation: 5/30/2025  PCP:    Sachi Martel PA  Provider: Chano Vázquez DO    CHIEF COMPLAINT       Chief Complaint   Patient presents with    Back Pain     Right sided       HISTORY OF PRESENT ILLNESS    Patient is 44-year-old female who presents with right flank pain started 2 days ago.  States history of ureteral stricture secondary to injury from hysterectomy.  This was 1 year ago.  Patient states increased urinary frequency.  She had the previous stent removed.  Patient states \"it feels like it did when I had my stent\".  She has nausea and 1 episode of vomiting.      Summary of old records:   History of right ureteral injury after abdominal hysterectomy and has had issues with ureteral strictures. S/P percutaneous nephrostomy tubes and stent placement, endoscopic dilations and stent placements. Cystoscopy with bilateral retrograde pyelogram from 7/17/2024 showed no filling defects.              Triage notes and Nursing notes were reviewed by myself.  Any discrepancies are addressed above.    PAST MEDICAL HISTORY     Past Medical History:   Diagnosis Date    Anxiety     Arthritis     Knee-left    COVID-19 2022    severe    COVID-19 vaccine administered     Depression     Dysmenorrhea     hysterectomy feb 2024    Flank pain     History of blood transfusion     feb 21, 2024    History of bronchitis     Hydronephrosis 02/16/2024    ureteral obstruction    Migraine     Muscle spasm     Ovarian cyst     Personal history of other medical treatment     Patient wears oral braces    Under care of service provider     PCP: Sachi Martel PA-C, [facility], last visit 8/2023    Under care of service provider     Podiatry: Dallas Mabry DPM, [facility], last visit 1/2024    Under care of service provider 04/24/2024    OBGYN: Ricardo Hoffman, last visit april 2024    Under care of service  5/30/25 2256)   ketorolac (TORADOL) injection 15 mg (15 mg IntraVENous Given 5/30/25 2256)         FINAL IMPRESSION      1. Acute cystitis with hematuria          DISPOSITION/PLAN   DISPOSITION Decision To Discharge 05/30/2025 10:43:40 PM   DISPOSITION CONDITION Stable           PATIENT REFERRED TO:  Sachi Martel PA  [address] [place] [address]  [phone]    Schedule an appointment as soon as possible for a visit in 2 days  ED follow-up      DISCHARGE MEDICATIONS:  Discharge Medication List as of 5/30/2025 10:15 PM        START taking these medications    Details   cephALEXin (KEFLEX) 500 MG capsule Take 1 capsule by mouth 4 times daily for 7 days, Disp-28 capsule, R-0Normal                    Chano Vázquez DO (electronically signed)  Attending Physician, Emergency Department         Chano Vázquez DO  05/31/25 [id number]

## 2025-05-31 NOTE — ED NOTES
Pt comes into ED c/o R-sided lower back pain for the last few days. Pt reports that she has hx of lower back pain d/t hysterectomy last year, but states that pain has increased within the last few days.

## 2025-05-31 NOTE — ED PROVIDER NOTES
EMERGENCY DEPARTMENT ENCOUNTER    Pt Name: Sylvia Weller  MRN: [id number]  Birthdate 1980  Date of evaluation: 5/30/25      Patient sign out from Dr. Vázquez.    Treatment and Disposition    Patient repeat assessment: The patient is hemodynamically stable.    -Labs demonstrating normal sodium, potassium 3.2, normal creatinine, WBC 9.5, hemoglobin 10.8, UA with nitrates, leuks and bacteria.    -CT urogram negative for hydronephrosis, questionable right pyelonephritis, however patient is afebrile and without flank tenderness.    -Treating for cystitis with Rocephin and provided Rx for Keflex    Case discussed with consulting clinician:  N/A    All questions answered.  Given strict return precautions.  No further work-up indicated at this time.    Social determinants of health impacting treatment or disposition:  None    Shared Decision Making completed with patient regarding risks and benefits of admission versus discharge.  Patient decides to be discharged home.    Code Status Discussion:  Not discussed    MIPS:  N/A    \"ED Course\" Notes From Epic Narrator:       CRITICAL CARE:   N/A    PROCEDURES:  Procedures      DATA FOR LAB AND RADIOLOGY TESTS ORDERED BELOW ARE REVIEWED BY THE ED CLINICIAN:    RADIOLOGY: All x-rays, CT, MRI, and formal ultrasound images (except ED bedside ultrasound) are read by the radiologist, see reports below, unless otherwise noted in MDM or here.  Reports below are reviewed by myself.  CT UROGRAM   Final Result   1.  Negative for hydronephrosis.      2.  Questionable right pyelonephritis.  Clinical correlation is needed             LABS: Lab orders shown below, the results are reviewed by myself, and all abnormals are listed below.  Labs Reviewed   CBC WITH AUTO DIFFERENTIAL - Abnormal; Notable for the following components:       Result Value    RBC 3.61 (*)     Hemoglobin 10.8 (*)     Hematocrit 33.0 (*)     Neutrophils % 72 (*)     Lymphocytes % 18 (*)     Eosinophils % 0 (*)     All

## 2025-07-16 ENCOUNTER — HOSPITAL ENCOUNTER (EMERGENCY)
Age: 45
Discharge: HOME | End: 2025-07-16
Attending: STUDENT IN AN ORGANIZED HEALTH CARE EDUCATION/TRAINING PROGRAM
Payer: COMMERCIAL

## 2025-07-16 ENCOUNTER — APPOINTMENT (OUTPATIENT)
Dept: GENERAL RADIOLOGY | Age: 45
End: 2025-07-16
Payer: COMMERCIAL

## 2025-07-16 VITALS
WEIGHT: 164 LBS | HEART RATE: 88 BPM | HEIGHT: 61 IN | RESPIRATION RATE: 14 BRPM | TEMPERATURE: 97.9 F | SYSTOLIC BLOOD PRESSURE: 128 MMHG | OXYGEN SATURATION: 100 % | DIASTOLIC BLOOD PRESSURE: 78 MMHG | BODY MASS INDEX: 30.96 KG/M2

## 2025-07-16 DIAGNOSIS — S93.401A SPRAIN OF RIGHT ANKLE, UNSPECIFIED LIGAMENT, INITIAL ENCOUNTER: Primary | ICD-10-CM

## 2025-07-16 DIAGNOSIS — N39.0 URINARY TRACT INFECTION WITHOUT HEMATURIA, SITE UNSPECIFIED: ICD-10-CM

## 2025-07-16 LAB
BACTERIA URNS QL MICRO: ABNORMAL
BILIRUB UR QL STRIP: NEGATIVE
CLARITY UR: ABNORMAL
COLOR UR: YELLOW
EPI CELLS #/AREA URNS HPF: ABNORMAL /HPF (ref 0–5)
GLUCOSE UR STRIP-MCNC: NEGATIVE MG/DL
HGB UR QL STRIP.AUTO: ABNORMAL
KETONES UR STRIP-MCNC: NEGATIVE MG/DL
LEUKOCYTE ESTERASE UR QL STRIP: ABNORMAL
NITRITE UR QL STRIP: NEGATIVE
PH UR STRIP: 6 [PH] (ref 5–8)
PROT UR STRIP-MCNC: NEGATIVE MG/DL
RBC #/AREA URNS HPF: ABNORMAL /HPF (ref 0–2)
SP GR UR STRIP: 1.02 (ref 1–1.03)
UROBILINOGEN UR STRIP-ACNC: NORMAL EU/DL (ref 0–1)
WBC #/AREA URNS HPF: ABNORMAL /HPF (ref 0–5)

## 2025-07-16 PROCEDURE — 99284 EMERGENCY DEPT VISIT MOD MDM: CPT

## 2025-07-16 PROCEDURE — 81001 URINALYSIS AUTO W/SCOPE: CPT

## 2025-07-16 PROCEDURE — 73610 X-RAY EXAM OF ANKLE: CPT

## 2025-07-16 PROCEDURE — 73630 X-RAY EXAM OF FOOT: CPT

## 2025-07-16 PROCEDURE — 6370000000 HC RX 637 (ALT 250 FOR IP): Performed by: PHYSICIAN ASSISTANT

## 2025-07-16 RX ORDER — CEPHALEXIN 500 MG/1
500 CAPSULE ORAL 2 TIMES DAILY
Qty: 14 CAPSULE | Refills: 0 | Status: SHIPPED | OUTPATIENT
Start: 2025-07-16 | End: 2025-07-23

## 2025-07-16 RX ORDER — HYDROCODONE BITARTRATE AND ACETAMINOPHEN 5; 325 MG/1; MG/1
1 TABLET ORAL ONCE
Status: COMPLETED | OUTPATIENT
Start: 2025-07-16 | End: 2025-07-16

## 2025-07-16 RX ORDER — KETOROLAC TROMETHAMINE 10 MG/1
10 TABLET, FILM COATED ORAL EVERY 8 HOURS PRN
Qty: 15 TABLET | Refills: 0 | Status: SHIPPED | OUTPATIENT
Start: 2025-07-16 | End: 2025-07-21

## 2025-07-16 RX ORDER — FLUCONAZOLE 150 MG/1
150 TABLET ORAL ONCE
Qty: 1 TABLET | Refills: 0 | Status: SHIPPED | OUTPATIENT
Start: 2025-07-16 | End: 2025-07-16

## 2025-07-16 RX ORDER — HYDROCODONE BITARTRATE AND ACETAMINOPHEN 5; 325 MG/1; MG/1
1-2 TABLET ORAL EVERY 8 HOURS PRN
Qty: 10 TABLET | Refills: 0 | Status: SHIPPED | OUTPATIENT
Start: 2025-07-16 | End: 2025-07-21

## 2025-07-16 RX ADMIN — HYDROCODONE BITARTRATE AND ACETAMINOPHEN 1 TABLET: 5; 325 TABLET ORAL at 10:14

## 2025-07-16 ASSESSMENT — PAIN DESCRIPTION - LOCATION: LOCATION: FOOT

## 2025-07-16 ASSESSMENT — PAIN SCALES - GENERAL
PAINLEVEL_OUTOF10: 10
PAINLEVEL_OUTOF10: 10

## 2025-07-16 ASSESSMENT — PAIN DESCRIPTION - ORIENTATION: ORIENTATION: RIGHT

## 2025-07-16 ASSESSMENT — PAIN - FUNCTIONAL ASSESSMENT: PAIN_FUNCTIONAL_ASSESSMENT: 0-10

## 2025-07-16 ASSESSMENT — PAIN DESCRIPTION - DESCRIPTORS: DESCRIPTORS: ACHING;THROBBING

## 2025-07-16 NOTE — ED NOTES
Pt proved ankle brace and crutches.  Pt states that she had used crutches in the past.  Pt denies any further need at this time.  Pt respirations are even and unlabored.  Pt is alert and oriented X 4.  Pt is speaking in complete sentences.     Will continue to monitor.

## 2025-07-16 NOTE — ED PROVIDER NOTES
[facility] EMERGENCY DEPARTMENT  [facility]      Pt Name: Sylvia Weller  MRN: [id number]  Birthdate 1980  Date ofevaluation: 7/16/2025  Provider: Jeffrey Miner PA-C    CHIEF COMPLAINT       Chief Complaint   Patient presents with    Foot Pain     Twisted foot last night, pain to right foot still, took 2 tramadol last night with no relief, pt also requesting to have urine sample, states strong odor and frequency    Urinary Frequency         HISTORY OF PRESENT ILLNESS  (Location/Symptom, Timing/Onset, Context/Setting, Quality, Duration, Modifying Factors, Severity.)   Sylvia Weller is a 44 y.o. female who presents to the emergency department with right foot pain and somewhat right ankle pain after stepping down on a unseen divot in the yard while taking out the garbage last night in the dark.  Also requesting urine sample as patient is for urinary tract infection and has a strong odor from her urine along with urinary frequency over the last few days.  No fevers or chills.  No nausea or vomiting.  No abdominal or flank pain at this point in time.      Nursing Notes were reviewed.    ALLERGIES     Patient has no known allergies.    CURRENT MEDICATIONS       Discharge Medication List as of 7/16/2025  1:48 PM        CONTINUE these medications which have NOT CHANGED    Details   cyclobenzaprine (FLEXERIL) 5 MG tablet Take 1 tablet by mouth every 8 hours as needed for Muscle spasmsHistorical Med      rizatriptan (MAXALT-MLT) 10 MG disintegrating tablet Historical Med      polyethylene glycol (GLYCOLAX) 17 GM/SCOOP powder Please follow instructions as given to you by your provider, Disp-238 g, R-0Normal      bisacodyl 5 MG EC tablet Please follow instructions as given to you by your provider, Disp-4 tablet, R-0Normal      predniSONE (DELTASONE) 10 MG tablet Take one PO TID x 3 days Take one PO BID x 3 days Take one PO qDaily x 3 days Take 1/2 PO qdaily x 3 days, Disp-20 tablet, R-0Normal

## 2025-07-16 NOTE — ED NOTES
Pt denies any needs at this time.  Pt eating turkey sandwich at this time.  Pt respirations are even and unlabored.  Pt is alert and oriented X 4.  Pt is speaking in complete sentences.   Bed is in the lowest position.   Call light is within reach.    Will continue to monitor.

## 2025-07-16 NOTE — DISCHARGE INSTRUCTIONS
Take meds as prescribed.  Follow up with doctor  in 3 -4 days.  Return to ER immediately if symptoms worsen or persist.    Do not drive, operate machinery, climb or engage in any dangerous activity while taking narcotics or norco

## 2025-08-08 ENCOUNTER — OFFICE VISIT (OUTPATIENT)
Age: 45
End: 2025-08-08
Payer: COMMERCIAL

## 2025-08-08 VITALS
WEIGHT: 164 LBS | BODY MASS INDEX: 30.96 KG/M2 | HEIGHT: 61 IN | SYSTOLIC BLOOD PRESSURE: 113 MMHG | HEART RATE: 84 BPM | DIASTOLIC BLOOD PRESSURE: 76 MMHG

## 2025-08-08 DIAGNOSIS — N13.5 OBSTRUCTION OF RIGHT URETER: ICD-10-CM

## 2025-08-08 DIAGNOSIS — N13.1 HYDRONEPHROSIS WITH URETERAL STRICTURE, NOT ELSEWHERE CLASSIFIED: ICD-10-CM

## 2025-08-08 DIAGNOSIS — N13.5 URETERAL STRICTURE, RIGHT: Primary | ICD-10-CM

## 2025-08-08 DIAGNOSIS — R10.9 FLANK PAIN: ICD-10-CM

## 2025-08-08 PROCEDURE — 4004F PT TOBACCO SCREEN RCVD TLK: CPT | Performed by: UROLOGY

## 2025-08-08 PROCEDURE — G8417 CALC BMI ABV UP PARAM F/U: HCPCS | Performed by: UROLOGY

## 2025-08-08 PROCEDURE — 99213 OFFICE O/P EST LOW 20 MIN: CPT | Performed by: UROLOGY

## 2025-08-08 PROCEDURE — G8427 DOCREV CUR MEDS BY ELIG CLIN: HCPCS | Performed by: UROLOGY

## 2025-08-08 RX ORDER — METHENAMINE HIPPURATE 1000 MG/1
1 TABLET ORAL DAILY
Qty: 90 TABLET | Refills: 3 | Status: SHIPPED | OUTPATIENT
Start: 2025-08-08

## (undated) DEVICE — METER,URINE,400ML,DRAIN BAG,L/F,LL: Brand: MEDLINE

## (undated) DEVICE — ELECTRO LUBE IS A SINGLE PATIENT USE DEVICE THAT IS INTENDED TO BE USED ON ELECTROSURGICAL ELECTRODES TO REDUCE STICKING.: Brand: KEY SURGICAL ELECTRO LUBE

## (undated) DEVICE — PACK PROCEDURE SURG CYSTO SVMMC LF

## (undated) DEVICE — ADAPTER URO SCP UROLOK LL

## (undated) DEVICE — CATHETER URETH 18FR BLLN 30CC 3 W F SPEC INF CTRL BARDX

## (undated) DEVICE — OCCLUSION BALLOON CATHETER: Brand: OCCLUDER

## (undated) DEVICE — SUTURE STRATAFIX SPRL PDS + SZ 0 L9IN ABSRB VLT CT-1 L36MM SXPP1B455

## (undated) DEVICE — CONE TIP URETERAL CATHETER WITH OPEN-END: Brand: CONE TIP

## (undated) DEVICE — ELECTRODE PT RET AD L9FT HI MOIST COND ADH HYDRGEL CORDED

## (undated) DEVICE — GLOVE ORANGE PI 7 1/2   MSG9075

## (undated) DEVICE — SINGLE-USE DIGITAL FLEXIBLE URETEROSCOPE: Brand: LITHOVUE

## (undated) DEVICE — STRAP,POSITIONING,KNEE/BODY,FOAM,4X60": Brand: MEDLINE

## (undated) DEVICE — BLADE CLIPPER GEN PURP NS

## (undated) DEVICE — APPLICATOR MEDICATED 26 CC SOLUTION HI LT ORNG CHLORAPREP

## (undated) DEVICE — SYRINGE MED 10ML LUERLOCK TIP W/O SFTY DISP

## (undated) DEVICE — 3M™ IOBAN™ 2 ANTIMICROBIAL INCISE DRAPE 6650EZ: Brand: IOBAN™ 2

## (undated) DEVICE — BALLOON DILATATION CATHETER KIT: Brand: UROMAX ULTRA KIT

## (undated) DEVICE — DRAPE,UNDRBUT,WHT GRAD PCH,CAPPORT,20/CS: Brand: MEDLINE

## (undated) DEVICE — INSUFFLATION TUBING SET WITH FILTER, FUNNEL CONNECTOR AND LUER LOCK: Brand: JOSNOE MEDICAL INC

## (undated) DEVICE — INSUFFLATION NEEDLE TO ESTABLISH PNEUMOPERITONEUM.: Brand: INSUFFLATION NEEDLE

## (undated) DEVICE — DRAPE,REIN 53X77,STERILE: Brand: MEDLINE

## (undated) DEVICE — AIRSEAL 8 MM ACCESS PORT AND LOW PROFILE OBTURATOR WITH BLADELESS OPTICAL TIP, 120 MM LENGTH: Brand: AIRSEAL

## (undated) DEVICE — TRI-LUMEN FILTERED TUBE SET WITH ACTIVATED CHARCOAL FILTER: Brand: AIRSEAL

## (undated) DEVICE — 1LYRTR 16FR10ML100%SIL UMS SNP: Brand: MEDLINE INDUSTRIES, INC.

## (undated) DEVICE — ARM DRAPE

## (undated) DEVICE — SUTURE VCRL SZ 0 L27IN ABSRB UD L26MM CT-2 1/2 CIR J270H

## (undated) DEVICE — PROTECTOR ULN NRV PUR FOAM HK LOOP STRP ANATOMICALLY

## (undated) DEVICE — SUTURE MONOCRYL SZ 4-0 L18IN ABSRB UD L16MM PC-3 3/8 CIR PRIM Y845G

## (undated) DEVICE — LIQUIBAND RAPID ADHESIVE 36/CS 0.8ML: Brand: MEDLINE

## (undated) DEVICE — GUIDEWIRE URO L150CM DIA .035IN STIFF NIT HYDRPHL STR TIP

## (undated) DEVICE — LOOP VES W25MM THK1MM MAXI RED SIL FLD REPELLENT 100 PER

## (undated) DEVICE — COVER,LIGHT HANDLE,FLX,2/PK: Brand: MEDLINE INDUSTRIES, INC.

## (undated) DEVICE — PLUG,CATHETER,DRAINAGE PROTECTOR,TUBE: Brand: MEDLINE

## (undated) DEVICE — SOLUTION SCRB 4OZ 4% CHG H2O AIDED FOR PREOPERATIVE SKIN

## (undated) DEVICE — CATHETER URET 5FR L70CM OPN END SGL LUMN INJ HUB FLEXIMA

## (undated) DEVICE — GARMENT,MEDLINE,DVT,INT,CALF,MED, GEN2: Brand: MEDLINE

## (undated) DEVICE — STRAP ARMBRD W1.5XL32IN FOAM STR YET SFT W/ HK AND LOOP

## (undated) DEVICE — UNDERPANTS MAT L/XL KNIT SEAMLESS CLR CODE WAISTBAND

## (undated) DEVICE — SYSTEM FLD COLL W/ FLX DRP SUPP AND DISP BG

## (undated) DEVICE — SVMMC GYN ROBOTIC PK

## (undated) DEVICE — SEAL

## (undated) DEVICE — SUTURE MCRYL SZ 4-0 L18IN ABSRB UD L16MM PC-3 3/8 CIR PRIM Y845G

## (undated) DEVICE — CONE TIP URETERAL CATHETER: Brand: URETERAL CATHETER

## (undated) DEVICE — Device: Brand: UTERINE ELEVATOR PRO

## (undated) DEVICE — SUTURE MONOCRYL SZ 4-0 L27IN ABSRB VLT RB-1 L17MM 1/2 CIR Y304H

## (undated) DEVICE — BLADELESS OBTURATOR: Brand: WECK VISTA

## (undated) DEVICE — LEGGINGS, PAIR, 31X48, STERILE: Brand: MEDLINE

## (undated) DEVICE — SINGLE ACTION PUMPING SYSTEM

## (undated) DEVICE — MHPB CYSTO PACK-LF: Brand: MEDLINE INDUSTRIES, INC.

## (undated) DEVICE — SUTURE CHROMIC GUT SZ 3-0 L27IN ABSRB BRN L26MM SH 1/2 CIR G122H

## (undated) DEVICE — CATHETER URETH 20FR BLLN 30CC 3 W F SPEC INF CTRL BARDX

## (undated) DEVICE — COUNTER NDL 10 COUNT HLD 20 FOAM BLK SGL MAG

## (undated) DEVICE — SOLUTION IRRIG 3000ML 0.9% SOD CHL USP UROMATIC PLAS CONT

## (undated) DEVICE — TIP COVER ACCESSORY

## (undated) DEVICE — AGENT HEMOSTATIC SURG ORIGINAL ABS 2X14IN LOOSE KNIT 12/CA

## (undated) DEVICE — TOWEL,OR,DSP,ST,NATURAL,DLX,4/PK,20PK/CS: Brand: MEDLINE

## (undated) DEVICE — STRAP,CATHETER,ELASTIC,HOOK&LOOP: Brand: MEDLINE

## (undated) DEVICE — SOLUTION SCRB 4OZ 2% CHG FOR SURG SCRBBING HND WSH

## (undated) DEVICE — BLADE,CARBON-STEEL,15,STRL,DISPOSABLE,TB: Brand: MEDLINE

## (undated) DEVICE — CONTAINER,SPECIMEN,4OZ,OR STRL: Brand: MEDLINE

## (undated) DEVICE — PAD PT POS 36 IN SURGYPAD DISP

## (undated) DEVICE — DUAL LUMEN URETERAL CATHETER

## (undated) DEVICE — SYRINGE CATH TIP 50ML

## (undated) DEVICE — SUTURE MONOCRYL 5-0 L27IN ABSRB VLT RB-1 L17MM 1/2 CIR Y303H

## (undated) DEVICE — SOLUTION ANTIFOG VIS SYS CLEARIFY LAPSCP

## (undated) DEVICE — PACK PROCEDURE SURG ROBOTIC KID SVMMC

## (undated) DEVICE — RESERVOIR,SUCTION,100CC,SILICONE: Brand: MEDLINE

## (undated) DEVICE — PREMIUM DRY TRAY LF: Brand: MEDLINE INDUSTRIES, INC.

## (undated) DEVICE — TUBING, SUCTION, 3/16" X 10', STRAIGHT: Brand: MEDLINE

## (undated) DEVICE — DRAIN,WOUND,15FR,3/16,FULL-FLUTED: Brand: MEDLINE

## (undated) DEVICE — BLANKET WRM W29.9XL79.1IN UP BODY FORC AIR MISTRAL-AIR

## (undated) DEVICE — STANDARD HYPODERMIC NEEDLE,ALUMINUM HUB: Brand: MONOJECT

## (undated) DEVICE — COVER OR TBL W40XL90IN ABSRB STD AND GRIPPY BK SAHARA

## (undated) DEVICE — SOLUTION IRRIG 1000ML 0.9% SOD CHL USP POUR PLAS BTL